# Patient Record
Sex: MALE | Race: WHITE | NOT HISPANIC OR LATINO | Employment: UNEMPLOYED | URBAN - METROPOLITAN AREA
[De-identification: names, ages, dates, MRNs, and addresses within clinical notes are randomized per-mention and may not be internally consistent; named-entity substitution may affect disease eponyms.]

---

## 2020-01-13 ENCOUNTER — TRANSFERRED RECORDS (OUTPATIENT)
Dept: HEALTH INFORMATION MANAGEMENT | Facility: CLINIC | Age: 35
End: 2020-01-13

## 2020-01-14 ENCOUNTER — TRANSFERRED RECORDS (OUTPATIENT)
Dept: HEALTH INFORMATION MANAGEMENT | Facility: CLINIC | Age: 35
End: 2020-01-14

## 2020-01-20 ENCOUNTER — TRANSFERRED RECORDS (OUTPATIENT)
Dept: HEALTH INFORMATION MANAGEMENT | Facility: CLINIC | Age: 35
End: 2020-01-20

## 2020-01-29 ENCOUNTER — TRANSFERRED RECORDS (OUTPATIENT)
Dept: HEALTH INFORMATION MANAGEMENT | Facility: CLINIC | Age: 35
End: 2020-01-29

## 2020-01-30 ENCOUNTER — TRANSFERRED RECORDS (OUTPATIENT)
Dept: HEALTH INFORMATION MANAGEMENT | Facility: CLINIC | Age: 35
End: 2020-01-30

## 2020-02-21 ENCOUNTER — MEDICAL CORRESPONDENCE (OUTPATIENT)
Dept: HEALTH INFORMATION MANAGEMENT | Facility: CLINIC | Age: 35
End: 2020-02-21

## 2020-02-21 ENCOUNTER — TRANSFERRED RECORDS (OUTPATIENT)
Dept: HEALTH INFORMATION MANAGEMENT | Facility: CLINIC | Age: 35
End: 2020-02-21

## 2020-02-26 ENCOUNTER — REFERRAL (OUTPATIENT)
Dept: TRANSPLANT | Facility: CLINIC | Age: 35
End: 2020-02-26

## 2020-02-26 DIAGNOSIS — K70.31 ALCOHOLIC CIRRHOSIS OF LIVER WITH ASCITES (H): Primary | ICD-10-CM

## 2020-02-26 DIAGNOSIS — K76.6 PORTAL HYPERTENSION (H): ICD-10-CM

## 2020-02-26 DIAGNOSIS — K70.30 ALCOHOLIC CIRRHOSIS (H): ICD-10-CM

## 2020-02-26 PROBLEM — M72.2 PLANTAR FASCIITIS: Status: ACTIVE | Noted: 2020-02-26

## 2020-02-26 PROBLEM — E87.1 HYPONATREMIA: Status: ACTIVE | Noted: 2019-11-27

## 2020-02-26 PROBLEM — E78.5 DYSLIPIDEMIA: Status: ACTIVE | Noted: 2020-02-26

## 2020-02-26 PROBLEM — Z71.89 ACP (ADVANCE CARE PLANNING): Status: ACTIVE | Noted: 2020-01-24

## 2020-02-26 PROBLEM — D64.9 NORMOCYTIC ANEMIA: Status: ACTIVE | Noted: 2019-11-27

## 2020-02-26 PROBLEM — E11.9 DIABETES MELLITUS, NEW ONSET (H): Status: ACTIVE | Noted: 2020-01-19

## 2020-02-26 PROBLEM — Z99.2 ESRD NEEDING DIALYSIS (H): Status: ACTIVE | Noted: 2020-01-10

## 2020-02-26 PROBLEM — D72.829 LEUKOCYTOSIS: Status: ACTIVE | Noted: 2020-02-11

## 2020-02-26 PROBLEM — K76.82 HEPATIC ENCEPHALOPATHY (H): Status: ACTIVE | Noted: 2020-01-14

## 2020-02-26 PROBLEM — K92.2 LOWER GI BLEED: Status: ACTIVE | Noted: 2020-01-19

## 2020-02-26 PROBLEM — D68.9 COAGULOPATHY (H): Status: ACTIVE | Noted: 2019-11-27

## 2020-02-26 PROBLEM — K70.10 ACUTE ALCOHOLIC LIVER DISEASE (H): Status: ACTIVE | Noted: 2019-11-27

## 2020-02-26 PROBLEM — D64.9 ACUTE ON CHRONIC ANEMIA: Status: ACTIVE | Noted: 2020-01-10

## 2020-02-26 PROBLEM — N18.6 ESRD NEEDING DIALYSIS (H): Status: ACTIVE | Noted: 2020-01-10

## 2020-02-26 PROBLEM — N17.9 AKI (ACUTE KIDNEY INJURY) (H): Status: ACTIVE | Noted: 2019-11-27

## 2020-02-26 PROBLEM — F17.200 TOBACCO USE DISORDER: Status: ACTIVE | Noted: 2020-02-26

## 2020-02-26 PROBLEM — E55.9 VITAMIN D DEFICIENCY: Status: ACTIVE | Noted: 2020-02-26

## 2020-02-26 PROBLEM — F10.20 ALCOHOL DEPENDENCE (H): Status: ACTIVE | Noted: 2019-11-27

## 2020-02-26 PROBLEM — I95.9 HYPOTENSION: Status: ACTIVE | Noted: 2020-02-11

## 2020-02-26 NOTE — LETTER
LIVER TRANSPLANT  EVALUATION SCHEDULE    Patient:   Christo Herrera  MR#:    [id number]  Coordinator:  Jaimie      [phone]  :     Sherry                 [phone]  [facility]  Date(s):    March 10, 2020 & March 23, 2020       This is your evaluation schedule, please follow dates and times.  You will   receive reminder phone calls for other tests, but please follow this schedule  only!  If you have any questions about dates and times, please call us on  number listed above.  Thank you, Transplant Services     *NO FOOD or DRINK AFTER 11:00 PM*  (You may only have small amounts of water)    Day/Date:    Tuesday, March 10, 2020  Time Location Activity   6:45 AM Imaging and Lab testing  (1st [facility] ) Liver Ultrasound with dopplers=NO FOOD or DRINK 8 HOURS BEFORE TEST!!!   7:30 AM Imaging and Lab testing  (1st floor [facility] ) Chest X-ray    8:00 AM Imaging and Lab testing  (1st floor [facility] ) EKG   8:15 AM Imaging and Lab testing  (1st floor [facility] ) Blood tests    8:30 AM Transplant Services  (3rd floor [facility]) Appointment with Chanel Cardenas  Registered Dietitian   9:00 AM Transplant Services  (3rd floor [facility]) Appointment with Oneal Lopez,     9:30 AM Transplant Services  (3rd floor [facility]) Appointment with Dr. Broderick,  Transplant Surgeon   11:00 AM Medicine Specialties  (3rd floor [facility]) Appointment with Dr. Echavarria,  Hepatologist           Day/Date:    Monday, March 23, 2020  Time Location Activity   8:00 AM [facility] Waiting Room  (2nd floor [facility]  [address] [place] [address]) Dobutamine Stress Echo = SEE ENCLOSED INSTRUCTIONS for TEST!   *9:00 AM [facility]  1st Floor/Entrance of [facility]  (Shuttle  is White with Maroon Lettering)   9:30 AM-11:30 AM Transplant Services  (3rd floor [facility]) Pre-transplant class         Day/Date:    Every Thursday *OPTIONAL*  Time Location Activity   12:00 PM-1:30 PM Liver Transplant Support Group  Hospital Station 7B  Room 7-120 Every Thursday *OPTIONAL*     *IF ON LINE CHECK IN IS NOT DONE, YOU WILL NEED TO CHECK IN 15 MINUTES EARLIER THEN THE FIRST SCHEDULED APPOINTMENT*

## 2020-02-26 NOTE — LETTER
3/3/2020    Christo Herrera  [address] [place] [address]      Dear Christo,    Thank you for your interest in the Transplant Center at [facility], [facility]. We look forward to being a part of your care team and assisting you through the transplant process.    As we discussed, your transplant coordinator is Christo Alcala Jr. (Liver).  You may call your coordinator at any time with questions or concerns call [phone].    Please complete the following.    1. Fill out and return the enclosed forms    Authorization for Electronic Communication    Authorization to Discuss Protected Health Information    Authorization for Release of Protected Health Information    2. Sign up for:    Tetra Techt, access to your electronic medical record (see enclosed pamphlet)    [email or web address], a transplant education website    You can use these tools to learn more about your transplant, communicate with your care team, and track your medical details      Sincerely,  Solid Organ Transplant  [facility], [facility]    cc: Referring Physician and PCP

## 2020-03-02 VITALS — BODY MASS INDEX: 24.33 KG/M2 | WEIGHT: 155 LBS | HEIGHT: 67 IN

## 2020-03-02 ASSESSMENT — MIFFLIN-ST. JEOR: SCORE: 1606.71

## 2020-03-02 NOTE — TELEPHONE ENCOUNTER
Patient was asked the following questions during liver intake call.     Referring Provider:   Referring Diagnosis: EToH Cirrhosis  PCP:     1)Do you know why you have liver disease: Yes             If Alcoholic Cirrhosis is present when was your last drink: 11/2019             Have you ever been through treatment for alcohol: No  2) Presence of Ascites: Yes Paracentesis: Yes  3) Presence of Hepatic Encephalopathy: Yes Medications: Yes  4) History of GI Bleeding: Yes  5) Oxygen Use: None  6) EGD: Yes Where: [facility] When: 2020  7) Colonoscopy: Yes Where: [facility] When:2020  8) MELD Score: 34  9) Insurance information: [employer] Plan       Policy boothe: Self       Subscriber/policy/ID number: [id number]      Group Number:     Referral intake process completed.  Patient is aware that after financial approval is received, medical records will be requested.   Patient confirmed for a callback from transplant coordinator on 03/04/2020.  Tentative evaluation date TBD.    Confirmed coordinator will discuss evaluation process in more detail at the time of their call.   Patient is aware of the need to arrange age appropriate cancer screening, vaccinations, and dental care.  Reminded patient to complete questionnaire, complete medical records release, and review packet prior to evaluation visit .  Assessed patient for special needs (ie--wheelchair, assistance, guardian, and ):  No  Patient instructed to call [phone] with questions.     LAVELLE Kaiser, LPN   Solid Organ Transplant

## 2020-03-04 NOTE — TELEPHONE ENCOUNTER
Alcoholic cirrhosis told to patient in October/November 2019. Did not know before then.    Treatment program at [facility] (January - Feb)    Dr. Candy Marroquin - PCP    MELD 34 (35 with HD points)    Ascites - on HD  Taps - every 2-3 weeks  HE - yes on meds  EGD - grade 1 1/20/20  Colonoscopy - 1/20/20    HD - MWF - but kidney improving, trying to move to PD  HD - [facility] (Marie - )    Head -   Heart -   Lungs - never smoked  Kidney - yes  Cancer -     Support - father main support  - aunt helps with rides  - has 3 brothers    Plan: patient appears to meet the acute criteria, with IP treatment done a month ago at [facility] (appears to have at least started treatment, but may have been too sick to finish???)  Will scheduled for full day on Tuesday to start evaluation. Has HD MWF and will set up class, etc for the following Monday.

## 2020-03-10 ENCOUNTER — COMMITTEE REVIEW (OUTPATIENT)
Dept: TRANSPLANT | Facility: CLINIC | Age: 35
End: 2020-03-10

## 2020-03-10 ENCOUNTER — ALLIED HEALTH/NURSE VISIT (OUTPATIENT)
Dept: TRANSPLANT | Facility: CLINIC | Age: 35
End: 2020-03-10
Attending: INTERNAL MEDICINE
Payer: COMMERCIAL

## 2020-03-10 ENCOUNTER — OFFICE VISIT (OUTPATIENT)
Dept: GASTROENTEROLOGY | Facility: CLINIC | Age: 35
End: 2020-03-10
Attending: INTERNAL MEDICINE
Payer: COMMERCIAL

## 2020-03-10 ENCOUNTER — OFFICE VISIT (OUTPATIENT)
Dept: TRANSPLANT | Facility: CLINIC | Age: 35
End: 2020-03-10
Attending: TRANSPLANT SURGERY
Payer: COMMERCIAL

## 2020-03-10 ENCOUNTER — ANCILLARY PROCEDURE (OUTPATIENT)
Dept: ULTRASOUND IMAGING | Facility: CLINIC | Age: 35
End: 2020-03-10
Attending: INTERNAL MEDICINE
Payer: COMMERCIAL

## 2020-03-10 ENCOUNTER — ANCILLARY PROCEDURE (OUTPATIENT)
Dept: GENERAL RADIOLOGY | Facility: CLINIC | Age: 35
End: 2020-03-10
Attending: INTERNAL MEDICINE
Payer: COMMERCIAL

## 2020-03-10 VITALS
WEIGHT: 151.7 LBS | BODY MASS INDEX: 22.99 KG/M2 | HEIGHT: 68 IN | HEART RATE: 82 BPM | DIASTOLIC BLOOD PRESSURE: 61 MMHG | TEMPERATURE: 97.9 F | OXYGEN SATURATION: 100 % | SYSTOLIC BLOOD PRESSURE: 95 MMHG

## 2020-03-10 DIAGNOSIS — K70.31 ALCOHOLIC CIRRHOSIS OF LIVER WITH ASCITES (H): Primary | ICD-10-CM

## 2020-03-10 DIAGNOSIS — E55.9 VITAMIN D DEFICIENCY: Primary | ICD-10-CM

## 2020-03-10 DIAGNOSIS — Z99.2 ESRD (END STAGE RENAL DISEASE) ON DIALYSIS (H): ICD-10-CM

## 2020-03-10 DIAGNOSIS — K70.31 ALCOHOLIC CIRRHOSIS OF LIVER WITH ASCITES (H): ICD-10-CM

## 2020-03-10 DIAGNOSIS — N18.6 ESRD (END STAGE RENAL DISEASE) ON DIALYSIS (H): ICD-10-CM

## 2020-03-10 DIAGNOSIS — K76.6 PORTAL HYPERTENSION (H): ICD-10-CM

## 2020-03-10 DIAGNOSIS — K70.30 ALCOHOLIC CIRRHOSIS (H): Primary | ICD-10-CM

## 2020-03-10 DIAGNOSIS — K76.82 HEPATIC ENCEPHALOPATHY (H): ICD-10-CM

## 2020-03-10 DIAGNOSIS — Z76.82 AWAITING ORGAN TRANSPLANT STATUS: Primary | ICD-10-CM

## 2020-03-10 LAB
ABO + RH BLD: NORMAL
AFP SERPL-MCNC: <1.5 UG/L (ref 0–8)
ALBUMIN SERPL-MCNC: 2.2 G/DL (ref 3.4–5)
ALBUMIN UR-MCNC: 30 MG/DL
ALP SERPL-CCNC: 237 U/L (ref 40–150)
ALT SERPL W P-5'-P-CCNC: 22 U/L (ref 0–70)
ANION GAP SERPL CALCULATED.3IONS-SCNC: 8 MMOL/L (ref 3–14)
APPEARANCE UR: CLEAR
AST SERPL W P-5'-P-CCNC: 48 U/L (ref 0–45)
BACTERIA #/AREA URNS HPF: ABNORMAL /HPF
BILIRUB DIRECT SERPL-MCNC: 1.2 MG/DL (ref 0–0.2)
BILIRUB SERPL-MCNC: 2 MG/DL (ref 0.2–1.3)
BILIRUB UR QL STRIP: ABNORMAL
BLD GP AB SCN SERPL QL: NORMAL
BLOOD BANK CMNT PATIENT-IMP: NORMAL
BUN SERPL-MCNC: 19 MG/DL (ref 7–30)
CALCIUM SERPL-MCNC: 8.4 MG/DL (ref 8.5–10.1)
CHLORIDE SERPL-SCNC: 102 MMOL/L (ref 94–109)
CHOLEST SERPL-MCNC: 224 MG/DL
CMV IGG SERPL QL IA: >8 AI (ref 0–0.8)
CO2 SERPL-SCNC: 27 MMOL/L (ref 20–32)
COLOR UR AUTO: ABNORMAL
CREAT SERPL-MCNC: 4.09 MG/DL (ref 0.66–1.25)
CREAT UR-MCNC: 512 MG/DL
DEPRECATED CALCIDIOL+CALCIFEROL SERPL-MC: 10 UG/L (ref 20–75)
EBV VCA IGG SER QL IA: >8 AI (ref 0–0.8)
ERYTHROCYTE [DISTWIDTH] IN BLOOD BY AUTOMATED COUNT: 16.8 % (ref 10–15)
FERRITIN SERPL-MCNC: 563 NG/ML (ref 26–388)
GFR SERPL CREATININE-BSD FRML MDRD: 18 ML/MIN/{1.73_M2}
GLUCOSE SERPL-MCNC: 116 MG/DL (ref 70–99)
GLUCOSE UR STRIP-MCNC: NEGATIVE MG/DL
HCT VFR BLD AUTO: 24.8 % (ref 40–53)
HDLC SERPL-MCNC: 42 MG/DL
HGB BLD-MCNC: 7.2 G/DL (ref 13.3–17.7)
HGB UR QL STRIP: NEGATIVE
HYALINE CASTS #/AREA URNS LPF: 248 /LPF (ref 0–2)
INR PPP: 1.48 (ref 0.86–1.14)
INTERPRETATION ECG - MUSE: NORMAL
IRON SATN MFR SERPL: 23 % (ref 15–46)
IRON SERPL-MCNC: 24 UG/DL (ref 35–180)
KETONES UR STRIP-MCNC: NEGATIVE MG/DL
LDLC SERPL CALC-MCNC: 153 MG/DL
LEUKOCYTE ESTERASE UR QL STRIP: ABNORMAL
MCH RBC QN AUTO: 29.9 PG (ref 26.5–33)
MCHC RBC AUTO-ENTMCNC: 29 G/DL (ref 31.5–36.5)
MCV RBC AUTO: 103 FL (ref 78–100)
MUCOUS THREADS #/AREA URNS LPF: PRESENT /LPF
NITRATE UR QL: NEGATIVE
NONHDLC SERPL-MCNC: 182 MG/DL
PH UR STRIP: 5 PH (ref 5–7)
PHOSPHATE SERPL-MCNC: 3.3 MG/DL (ref 2.5–4.5)
PLATELET # BLD AUTO: 161 10E9/L (ref 150–450)
POTASSIUM SERPL-SCNC: 3.4 MMOL/L (ref 3.4–5.3)
PROT SERPL-MCNC: 6 G/DL (ref 6.8–8.8)
PROT UR-MCNC: 1.6 G/L
PROT/CREAT 24H UR: 0.31 G/G CR (ref 0–0.2)
RBC # BLD AUTO: 2.41 10E12/L (ref 4.4–5.9)
RBC #/AREA URNS AUTO: 2 /HPF (ref 0–2)
SODIUM SERPL-SCNC: 136 MMOL/L (ref 133–144)
SOURCE: ABNORMAL
SP GR UR STRIP: 1.02 (ref 1–1.03)
SPECIMEN EXP DATE BLD: NORMAL
SPECIMEN EXP DATE BLD: NORMAL
SQUAMOUS #/AREA URNS AUTO: 1 /HPF (ref 0–1)
T PALLIDUM AB SER QL: NONREACTIVE
TIBC SERPL-MCNC: 107 UG/DL (ref 240–430)
TRANSFERRIN SERPL-MCNC: 94 MG/DL (ref 210–360)
TRIGL SERPL-MCNC: 146 MG/DL
TSH SERPL DL<=0.005 MIU/L-ACNC: 3.52 MU/L (ref 0.4–4)
UROBILINOGEN UR STRIP-MCNC: 0 MG/DL (ref 0–2)
WBC # BLD AUTO: 8.4 10E9/L (ref 4–11)
WBC #/AREA URNS AUTO: 11 /HPF (ref 0–5)

## 2020-03-10 PROCEDURE — 80076 HEPATIC FUNCTION PANEL: CPT | Performed by: INTERNAL MEDICINE

## 2020-03-10 PROCEDURE — 82728 ASSAY OF FERRITIN: CPT | Performed by: INTERNAL MEDICINE

## 2020-03-10 PROCEDURE — 86850 RBC ANTIBODY SCREEN: CPT | Performed by: INTERNAL MEDICINE

## 2020-03-10 PROCEDURE — 82105 ALPHA-FETOPROTEIN SERUM: CPT | Performed by: INTERNAL MEDICINE

## 2020-03-10 PROCEDURE — 36415 COLL VENOUS BLD VENIPUNCTURE: CPT | Performed by: INTERNAL MEDICINE

## 2020-03-10 PROCEDURE — 83540 ASSAY OF IRON: CPT | Performed by: INTERNAL MEDICINE

## 2020-03-10 PROCEDURE — 84156 ASSAY OF PROTEIN URINE: CPT | Performed by: INTERNAL MEDICINE

## 2020-03-10 PROCEDURE — 80061 LIPID PANEL: CPT | Performed by: INTERNAL MEDICINE

## 2020-03-10 PROCEDURE — 86886 COOMBS TEST INDIRECT TITER: CPT | Performed by: INTERNAL MEDICINE

## 2020-03-10 PROCEDURE — 86665 EPSTEIN-BARR CAPSID VCA: CPT | Performed by: INTERNAL MEDICINE

## 2020-03-10 PROCEDURE — 82306 VITAMIN D 25 HYDROXY: CPT | Performed by: INTERNAL MEDICINE

## 2020-03-10 PROCEDURE — 84100 ASSAY OF PHOSPHORUS: CPT | Performed by: INTERNAL MEDICINE

## 2020-03-10 PROCEDURE — 80321 ALCOHOLS BIOMARKERS 1OR 2: CPT | Performed by: INTERNAL MEDICINE

## 2020-03-10 PROCEDURE — 86901 BLOOD TYPING SEROLOGIC RH(D): CPT | Performed by: INTERNAL MEDICINE

## 2020-03-10 PROCEDURE — 80048 BASIC METABOLIC PNL TOTAL CA: CPT | Performed by: INTERNAL MEDICINE

## 2020-03-10 PROCEDURE — 84443 ASSAY THYROID STIM HORMONE: CPT | Performed by: INTERNAL MEDICINE

## 2020-03-10 PROCEDURE — 85610 PROTHROMBIN TIME: CPT | Performed by: INTERNAL MEDICINE

## 2020-03-10 PROCEDURE — 83550 IRON BINDING TEST: CPT | Performed by: INTERNAL MEDICINE

## 2020-03-10 PROCEDURE — 86900 BLOOD TYPING SEROLOGIC ABO: CPT | Mod: 91 | Performed by: INTERNAL MEDICINE

## 2020-03-10 PROCEDURE — 86644 CMV ANTIBODY: CPT | Performed by: INTERNAL MEDICINE

## 2020-03-10 PROCEDURE — 84466 ASSAY OF TRANSFERRIN: CPT | Performed by: INTERNAL MEDICINE

## 2020-03-10 PROCEDURE — 86780 TREPONEMA PALLIDUM: CPT | Performed by: INTERNAL MEDICINE

## 2020-03-10 PROCEDURE — 86481 TB AG RESPONSE T-CELL SUSP: CPT | Performed by: INTERNAL MEDICINE

## 2020-03-10 PROCEDURE — 81001 URINALYSIS AUTO W/SCOPE: CPT | Performed by: INTERNAL MEDICINE

## 2020-03-10 PROCEDURE — 87086 URINE CULTURE/COLONY COUNT: CPT | Performed by: INTERNAL MEDICINE

## 2020-03-10 PROCEDURE — 85027 COMPLETE CBC AUTOMATED: CPT | Performed by: INTERNAL MEDICINE

## 2020-03-10 RX ORDER — ALBUMIN (HUMAN) 12.5 G/50ML
12.5 SOLUTION INTRAVENOUS
Status: CANCELLED | OUTPATIENT
Start: 2020-03-10

## 2020-03-10 RX ORDER — MENTHOL AND METHYL SALICYLATE 10; 30 G/100G; G/100G
CREAM TOPICAL DAILY PRN
COMMUNITY

## 2020-03-10 RX ORDER — CALCIUM CARBONATE 160(400)MG
TABLET,CHEWABLE ORAL
COMMUNITY
Start: 2020-01-27 | End: 2021-08-11

## 2020-03-10 RX ORDER — LIDOCAINE HYDROCHLORIDE 10 MG/ML
20 INJECTION, SOLUTION EPIDURAL; INFILTRATION; INTRACAUDAL; PERINEURAL ONCE
Status: CANCELLED | OUTPATIENT
Start: 2020-03-10

## 2020-03-10 RX ORDER — LACTULOSE 10 G/15ML
10-15 SOLUTION ORAL 2 TIMES DAILY
COMMUNITY
Start: 2020-02-15 | End: 2023-10-31

## 2020-03-10 RX ORDER — METOPROLOL TARTRATE 25 MG/1
6.25 TABLET, FILM COATED ORAL 2 TIMES DAILY
COMMUNITY
Start: 2020-03-09 | End: 2020-07-20

## 2020-03-10 RX ORDER — MULTIVITAMIN,THER AND MINERALS
1 TABLET ORAL EVERY MORNING
COMMUNITY
Start: 2019-12-29

## 2020-03-10 RX ORDER — MIDODRINE HYDROCHLORIDE 5 MG/1
2.5 TABLET ORAL 3 TIMES DAILY PRN
COMMUNITY
Start: 2020-02-15 | End: 2023-10-31

## 2020-03-10 RX ORDER — GUAIFENESIN 600 MG/1
600 TABLET, EXTENDED RELEASE ORAL 2 TIMES DAILY PRN
COMMUNITY
Start: 2020-02-15 | End: 2023-10-31

## 2020-03-10 RX ORDER — LANOLIN ALCOHOL/MO/W.PET/CERES
100 CREAM (GRAM) TOPICAL
COMMUNITY
Start: 2020-03-09 | End: 2021-08-11

## 2020-03-10 ASSESSMENT — MIFFLIN-ST. JEOR: SCORE: 1603.12

## 2020-03-10 NOTE — COMMITTEE REVIEW
Abdominal Committee Review Note     Evaluation Date: 3/10/2020  Committee Review Date: 3/10/2020    Organ being evaluated for: Liver    Transplant Phase: Evaluation  Transplant Status: Declined    Transplant Coordinator: Christo Alcala Jr.  Transplant Surgeon:   Josr Broderick    Referring Physician: Luda Caraballo    Primary Diagnosis: alcoholic cirrhosis  Secondary Diagnosis: dialysis dependent    Committee Review Members:  Nutrition Chanel Cardenas, RD   Pharmacy Abdullahi Das, [facility]    - Clinical Oneal Lopez, KAYA, Martha Dueñas, [facility]   Transplant Félix Thomas MD, Jr Christo Alcala, HARSHAD, Savita Oconnell, APRN CNP, Brandin Echavarria MD, Misha Bowen MD, Marie Evans RN, Josr Broderick MD, Thomas M. Leventhal, MD, Gerson Solis MD       Transplant Eligibility: ETOH, Cirrhosis with MELD    Committee Review Decision: Declined    Relative Contraindications: Other, CD evaluation and recommendations / 6 mo sobriety    Absolute Contraindications: None    Committee Chair Brandin Echavarria MD verbally attested to the committee's decision.    Committee Discussion Details:     - Complete DSE and txp class on Monday  - Close evaluation  - Continue to follow and re-evaluation at 6 mo sober & doing CD recs  - Possible liver could improve, but good chance he will need a kidney

## 2020-03-10 NOTE — LETTER
3/10/2020       RE: Christo Herrera  [address] [place] [address]     Dear Colleague,    Thank you for referring your patient, Christo Herrera, to the [facility] HEPATOLOGY at [facility]. Please see a copy of my visit note below.    [facility]    Hepatology New Patient Visit    Referring provider:  Luda Caraballo    CHIEF COMPLAINT/REASON FOR THE VISIT:  Alcoholic liver disease.      HISTORY OF PRESENT ILLNESS:  Mr. Christo Herrera is a 34-year-old male with a history significant for alcohol abuse from early age who is sent to us for alcohol-related cirrhosis and evaluation for transplant.  Mr. Herrera started alcoholic beverages from an early age of 18, and according to him, he was drinking more in the last 3-4 years as he had lost his job.  Sometime in November of last year  he developed what appears to be alcoholic hepatitis, and he presented with jaundice and other signs of portal hypertension including fluid retention.  He also had paracentesis at that time, although he did not have any spontaneous bacterial peritonitis.  He continued to have multiple admissions in December for septic shock, anemia, hepatic encephalopathy and GI bleed and, in fact, still follows with [facility] for that.  He did have an EGD, which showed grade 1 esophageal varices and portal hypertensive gastropathy.  He had a colonoscopy which showed only a hemorrhoidal bleed.      Mr. Herrera today appears to be lucid and answers all questions appropriately, although he is in a wheelchair.  He can walk.  He has also no significant abdominal pain as of now.  Denies any nausea or vomiting.  He is having 4-5 bowel movements and takes lactulose.  He did lose a lot of muscle mass according to him and his dad also.  His last alcohol intake appears to be in 12/2019 but we have documentation that he might have had a positive test in January  most likely.       Mr. Herrera also for the last 9 months stayed on dialysis and still continues to do it.  It was initially thought that his kidney disease was related with ATN with possible hepatorenal syndrome.     Medical hx Surgical hx   Past Medical History:   Diagnosis Date     Alcoholic cirrhosis of liver with ascites (H) 3/10/2020     Ascites      ESRD (end stage renal disease) (H)      GERD (gastroesophageal reflux disease)      HE (hepatic encephalopathy) (H)      History of blood transfusion       Past Surgical History:   Procedure Laterality Date     COLONOSCOPY      [facility] 2020     GI SURGERY            Medications  Prior to Admission medications    Medication Sig Start Date End Date Taking? Authorizing Provider   guaiFENesin (MUCINEX) 600 MG 12 hr tablet Take 600 mg by mouth 2/15/20   Reported, Patient   lactulose (CHRONULAC) 10 GM/15ML solution Take 10 g by mouth 2/15/20   Reported, Patient   Menthol-Methyl Salicylate (ICY HOT EXTRA STRENGTH) 10-30 % CREA     Reported, Patient   metoprolol tartrate (LOPRESSOR) 25 MG tablet Take 6.25 mg by mouth 3/9/20   Reported, Patient   midodrine (PROAMATINE) 5 MG tablet Take 15 mg by mouth 2/15/20   Reported, Patient   Misc. Devices (ROLLATOR ULTRA-LIGHT) MISC Walker with front wheels for home use. 1/27/20   Reported, Patient   Multiple Vitamins-Minerals (SUPER THERA PRERNA M) TABS Take 1 tablet by mouth 12/29/19   Reported, Patient   Psyllium 58.12 % PACK Bid daily    Reported, Patient   vitamin B1 (VITAMIN B-1) 100 MG tablet Take 100 mg by mouth 3/9/20   Reported, Patient       Allergies  No Known Allergies    Family hx Social hx   Family History   Problem Relation Age of Onset     Coronary Artery Disease Father      Hypertension Maternal Grandfather       Social History     Tobacco Use     Smoking status: Never Smoker     Smokeless tobacco: Never Used   Substance Use Topics     Alcohol use: Not Currently     Comment: Quit 11/2019     Drug use: Not  "Currently          REVIEW OF SYMPTOMS:  He denies having any recent infections, although he was admitted to the hospital sometime for septicemia.  He has significant fatigue.  He is in a wheelchair.  Denies any headaches or seizures like withdrawal seizures.  Does not have any cough or shortness of breath now nor does he have any chest pain.  He has anemia and easy bruising.  Is not known to have diabetes or thyroid disease.  He is not known to have any degenerative joint disease.  Psychiatric wise, he has a prolonged history of alcohol abuse.  As far as the eyes are concerned, no issues.  Likewise, he does not have any hearing issues.  Skin wise, he has no problems except the easy bruising.         Examination  BP 95/61   Pulse 82   Temp 97.9  F (36.6  C) (Oral)   Ht 1.72 m (5' 7.72\")   Wt 68.8 kg (151 lb 11.2 oz)   SpO2 100%   BMI 23.26 kg/m       Gen- well, NAD, A+Ox3, normal color  Eye- EOMI  ENT- MMM, normal oropharynx  Lym- no palpable lymphadenopathy  CVS- S1, S2 normal, no added sounds, RRR  RS- CTA  Abd- Mildly distended with shifting dullness.  Extr- pulses good, no LARISSA  MS- hands normal- no clubbing  Neuro- A+Ox3, no asterixis  Skin- no rash or jaundice  Psych- normal mood    Laboratory  Lab Results   Component Value Date     03/10/2020    POTASSIUM 3.4 03/10/2020    CHLORIDE 102 03/10/2020    CO2 27 03/10/2020    BUN 19 03/10/2020    CR 4.09 03/10/2020       Lab Results   Component Value Date    BILITOTAL 2.0 03/10/2020    ALT 22 03/10/2020    AST 48 03/10/2020    ALKPHOS 237 03/10/2020       Lab Results   Component Value Date    ALBUMIN 2.2 03/10/2020    PROTTOTAL 6.0 03/10/2020        Lab Results   Component Value Date    WBC 8.4 03/10/2020    HGB 7.2 03/10/2020     03/10/2020     03/10/2020       Lab Results   Component Value Date    INR 1.48 03/10/2020     MELD-Na score: 27 at 3/10/2020  7:21 AM  MELD score: 27 at 3/10/2020  7:21 AM  Calculated from:  Serum Creatinine: 4.09 " mg/dL (Rounded to 4 mg/dL) at 3/10/2020  7:21 AM  Serum Sodium: 136 mmol/L at 3/10/2020  7:21 AM  Total Bilirubin: 2.0 mg/dL at 3/10/2020  7:21 AM  INR(ratio): 1.48 at 3/10/2020  7:21 AM  Age: 34 years 9 months      Radiology    ASSESSMENT AND PLAN:  Alcoholic liver disease.        Mr. Herrera has alcoholic liver disease, most likely acute on chronic liver disease.  He, in fact, has improved.  As his bilirubin went down, the INR also improved.  He has done a short period of around 18 days of alcohol treatment.  He has been, also, on dialysis now for 9 weeks.  In case he is evaluated or goes forward for evaluation, he will need a liver and kidney, as in my opinion, there is a possibility that his kidney does not come back.  He has only been alcohol free for possibly 2 months.  He will need 6 months of abstinence.  He might need, also, further treatment, and we will need, also, information regarding his previous treatment of chemical dependency.  His chance of relapse is very high, so we await input from our  and her recommendations, and we will follow him here in 3 months.  We did explain that to him and his dad.  In the meantime, he will need rehabilitation, physical, also, as the patient although 34 is still wheelchair bound, and he will do that.  As far as complications from his liver disease are concerned, he will continue doing paracentesis as needed with his hepatologist, and he will need, also, to continue doing surveillance for HCC or worsening of his portal hypertension through upper endoscopies.  Please note that the patient has grade 1 esophageal varices.  I will see him here in 3 months, at which time he will be close to finishing his 6 month sobriety.      This was a 1 hour visit, of which more than 50% was spent in explaining to the patient what our plan of care was.  We answered all his questions.      cc:   Luda Caraballo MD   [facility]    [address] [place] [address]    [place] [address]     Brandin Echavarria MD  Hepatology  [facility]

## 2020-03-10 NOTE — PROGRESS NOTES
Psychosocial Assessment for Liver Transplant Evaluation  Christo Herrera was seen in the Transplant Center as part of his evaluation as a potential liver transplant recipient.  His father Christo was also present.  Living Situation: Christo lives with his father Christo and brother Miguel in a house in [place], [place].  His father owns the house.  Christo has lived with his father and brother for the past three to four months, and previously lived with his mother.  He changed his living situation because his mother smokes cigarettes.  Christo' father assists him with his medication management and transportation to medical appointments.  Education/Employment:  Nelson graduated high school.  He is not a [employer].  Nelson last worked four years ago, initially due to substance abuse and at present he is unable to work due to his health.    Financial /Income: Christo does not have a source of income.  He is financially supported by his father.  I provided education about applying for SSDI and Supplemental Security Income.  Christo reports his cousin Malinda is assisting him in applying.  Health Insurance:  [employer] Medical Assistance.  This writer talked with Christo and his father about the financial risks of transplant, particularly about the high cost of transplant related medications and the importance of maintaining adequate health insurance coverage.  Family/Social Support:   This writer stressed the importance of having a stable and involved support network before and after transplant.  Provided Christo and his father with education about the relationship between a stable support system and better surgical and post-transplant outcomes compared to patients with a limited support system.    Functional Status: Christo has functional limitations and this should be monitored.  He is using a clinic wheelchair today, and he reports using a walker as needed at home.  Chemical Dependency:  Christo denies any history of using tobacco  products.  He reports marijuana use but none since his mid twenties.  Christo denies pain medication abuse.   In regards to alcohol, Christo reports a history of consuming a 1/2 liter of liquor, six days of the week up until the end of October 2019.  He reports he would start drinking in the morning and throughout the day.   Christo started drinking at the age of [age].  He has a history of one DUI in his early twenties.  Christo also reports he completed inpatient treatment at [facility] in February of 2020 for eighteen days.  He is not currently engaged in relapse prevention.  I did provide him with education and a consent for for [facility].  Of note, the medical records documents patient having positive alcohol tests on 12/20/19 and 1/13/20, though Christo does not recall this information.  Mental Health: Christo denies any mental health history.    PHQ-9 score today: 1  BERTA-7 score today: 0  Adjustment to Illness:  Christo has cirrhosis caused by alcohol.   He attributes his heavy alcohol consumption to his peer group, not being employed and using alcohol to help sleep.   Christo feels inpatient treatment was helpful and he is hopeful he can maintain sobriety lifelong.  This writer provided Christo and his father with supportive counseling throughout this interview.  This writer also encouraged Christo and his father to attend the liver transplant support group for additional support and encouragement.   Impression/Recommendations:   Christo and his father verbalize understanding the psychosocial risks of transplant and teaching provided during this evaluation.  Christo has not met sobriety criteria recommended for listing,  He does not remember consuming alcohol beyond October of last year, and medical records document consumption as recent as January 13, 2020.  PEth and EtG tests are pending.  Christo did complete an eighteen day inpatient substance abuse treatment program in February of this  year. I have requested a discharge summary from this treatment, which I will review and evaluate for any further recommendations.  Christo is not currently engaged in relapse prevention.  I have recommended he begin using [facility]'s telephone coaching.  A referral to Dr. Renae, Psychologist should be considered.  Christo has strong support from his father Christo who is present today.  The Caregiver Agreement for Liver Transplantation was discussed.  Christo has no source of income, and should apply for SSDI and Supplemental Security Income.  Christo has adequate health insurance for transplantation.  Christo is a poor transplant candidate from a psychosocial perspective at this time.  This writer will remain available to assist patient throughout the evaluation process.  It was a pleasure to evaluate this patient for liver transplant.   Teaching completed during assessment:  1.     Housing and relocation needs post transplant.  2.     Caregiver needs post transplant.  3.     Financial issues related to transplant.  4.     Risks of alcohol use post transplant.  5.     Common psychosocial stressors pre/post transplant.        6.     Liver Transplant support group availability.        7.     [employer] Directive-form and education provided             Psychosocial Risks of Transplant Reviewed:  1.     Increased stress related to your emotional, family, social, employment, or   financial situation.  2.     Affect on work and/or disability benefits.  3.     Affect on future health and life insurance.  4.     Transplant outcome expectations may not be met.  5.     Mental Health risks: anxiety, depression, PTSD, guilt, grief and chronic fatigue.     [place]   Back pain and leg pain

## 2020-03-10 NOTE — LETTER
March 12, 2020    Christo Herrera  [address] [place] [address]    Dear Mr. Herrera,   The purpose of this letter is to let you know that on  the [facility] Multi-Disciplinary Selection Team reviewed the results of your transplant evaluation.  Based on the results of your evaluation and the selection criteria used by our program , the decision was made to not list you on the kidney and liver transplant list.  This is because you have not quite met our chemical dependency requirements for listing  Important things you should know:    We recommend that you continue to follow up with your primary care doctor and/or GI provider in order to manage your health concerns.    You should plan on attending transplant class and having the Dobutamine Stress echo done in prep for when we are able to get you back into evaluation. This will be re-scheduled for mid to late April due to Covid-19 concerns / policies     You should plan on following with Dr. Echavarria on 5/5/20 to continue your liver care. We will also assess if/when we can get you back into transplant evaluation.   Enclosed is a letter from [facility] which describes the services offered to patients by [facility] and the Organ Procurement and Transplantation Network.  Thank you for allowing us to participate in your care.  We wish you well.  Sincerely,    Christo Alcala Jr., BSN, RN  Liver Transplant Coordinator  [phone]    Solid Organ Transplant  [facility]    Enclosures: OS Letter  cc: Care Team            The Organ Procurement and Transplantation Network  Toll-free patient services line:     Your resource for organ transplant information    If you have a question regarding your own medical care, you always should call your transplant hospital first. However, for general organ transplant-related information, you can call the Organ Procurement and  Transplantation Network (OPTN) toll-free patient services line at [phone]- [phone]. Anyone, including potential transplant candidates, candidates, recipients, family members, friends, living donors, and donor family members, can call this number to:          Talk about organ donation, living donation, the transplant process, the donation process, and transplant policies.    Get a free patient information kit with helpful booklets, waiting list and transplant information, and a list of all transplant hospitals.    Ask questions about the OPTN website (https://optn.transplant.hrsa.gov/), the United Network for Organ Sharing s (UNOS) website (https://unos.org/), or the UNOS website for living donors and transplant recipients. (https://www.transplantliving.org/).    Learn how the OPTN can help you.    Talk about any concerns that you may have with a transplant hospital.    The [facility] transplant system, the OPTN, is managed under federal contract by the United Network for Organ Sharing (UNOS), which is a non-profit charitable organization. The OPTN helps create and define organ sharing policies that make the best use of donated organs. This process continuously evaluating new advances and discoveries so policies can be adapted to best serve patients waiting for transplants. To do so, the OPTN works closely with transplant professionals, transplant patients, transplant candidates, donor families, living donors, and the public. All transplant programs and organ procurement organizations throughout the country are OPTN members and are obligated to follow the policies the OPTN creates for allocating organs.    The OPTN also is responsible for:      Providing educational material for patients, the public, and professionals.    Raising awareness of the need for donated organs and tissue.    Coordinating organ procurement, matching, and placement.    Collecting information about every organ transplant and donation that occurs  in the [place].    Remember, you should contact your transplant hospital directly if you have questions or concerns about your own medical care including medical records, work-up progress, and test results.    We are not your transplant hospital, and our staff will not be able to answer questions about your case, so please keep your transplant hospital s phone number handy.    However, while you research your transplant needs and learn as much as you can about transplantation and donation, we welcome your call to our toll-free patient services line at [phone].          Updated 4/1/2019

## 2020-03-10 NOTE — PROGRESS NOTES
Outpatient MNT: Liver Transplant Evaluation    Current BMI: 23.3 (HT 67.75 in,  lbs/69 kg)  BMI is within criteria of <40 for liver transplant     Time Spent: 15 minutes  Visit Type: Initial  Referring Physician: Davie   Pt accompanied by: his dad     Medical dx associated with RD referral  - EtOH cirrhosis  - ESRD    History of previous txp: none     Nutrition Assessment  Pt has been on HD MWF 7-1030 am for 3 months. He does receive a protein drink with dialysis. He cooks for himself.     Appetite: good/baseline     Vitamins, Supplements, Pertinent Meds: MVI, B1  Herbal Medicines/Supplements: none     Diet Recall  Breakfast Cereal    Lunch Turkey s/w    Dinner Chicken/pork/roast (6 oz) + potato or burgers    Snacks Ritz or tory crackers, veggies, berries    Beverages Lemonade, water, cranberry or pineapple juice, some sprite; has 1-2 protein drinks/day    Dining out 2x/month      Physical Activity  Some upper body activities and walking      Anthropometrics  Height:   67.75 in   BMI:    23.3    Weight Status:Normal BMI   Weight:  151 lbs            IBW (lb): 152  % IBW: 99    Wt Hx: Pt reports + edema. Prior UBW was 180-190 lbs, with noted muscle loss (moderate to severe).     Adj/dosing BW: 151 lbs/69 kg       Malnutrition  % Intake: No decreased intake noted  % Weight Loss: yes, but difficult to determine dry vs fluid loss  Subcutaneous Fat Loss: Mild/Moderate  Muscle Loss: Moderate/Severe  Fluid Accumulation/Edema: Moderate   Malnutrition Diagnosis: Non-Severe vs severe malnutrition in the context of chronic illness     Estimated Nutrition Needs  Energy  [id number]     (25-30 kcal/kg for maintenance)     Protein      (1.2-1.5 g/kg for increased needs, dialysis)         Fluid  1 ml/kcal or per MD        Micronutrient   Na+: <2000 mg/day            Nutrition Diagnosis  Food and nutrition related knowledge deficit r/t pre liver transplant eval AEB pt verbalized not hearing pre/post transplant  diet guidelines.    Nutrition Intervention  Nutrition education provided:  Discussed sodium intake (low sodium foods and drinks, seasoning food without salt and tips for low sodium diet).    Reviewed adequate protein intake. Encouraged receiving protein from both animal and plant based sources.     Reviewed wnl K level of 3.5 from 2/21 and slightly elevated Phos level of 5.2. Discussed foods to limit pending lab values that were just drawn yesterday per pt.     Reviewed post txp diet guidelines in brief (will review in further detail post txp):  (1) Review of proper food safety measures d/t immunosuppressant therapy post-op and increased risk for food-borne illness    (2) Avoid the following post txp d/t risk for rejection, unknown effects on the organs, and/or potential interactions with immunosuppressants:  - Herbal, Chinese, holistic, chiropractic, natural, alternative medicines and supplements  - Detoxes and cleanses  - Weight loss pills  - Protein powders or other products with extracts or herbs (ie green tea extract)    (3) Med regimen and possible side effects    Patient Understanding: Pt verbalized understanding of education provided.  Expected Compliance: Good  Follow-Up Plans: PRN     Nutrition Goals  1. Limit Na+ <2000mg/day  2. Pt to verbalize understanding of 3 aspects of post txp education provided  3. Minimum of 83 g protein/day     Provided pt with contact info.   Chanel Cardenas RD, LD  Pgr [phone]

## 2020-03-10 NOTE — PROGRESS NOTES
[facility]    Hepatology New Patient Visit    Referring provider:  Luda Caraballo    CHIEF COMPLAINT/REASON FOR THE VISIT:  Alcoholic liver disease.      HISTORY OF PRESENT ILLNESS:  Mr. Christo Herrera is a 34-year-old male with a history significant for alcohol abuse from early age who is sent to us for alcohol-related cirrhosis and evaluation for transplant.  Mr. Herrera started alcoholic beverages from an early age of 18, and according to him, he was drinking more in the last 3-4 years as he had lost his job.  Sometime in November of last year  he developed what appears to be alcoholic hepatitis, and he presented with jaundice and other signs of portal hypertension including fluid retention.  He also had paracentesis at that time, although he did not have any spontaneous bacterial peritonitis.  He continued to have multiple admissions in December for septic shock, anemia, hepatic encephalopathy and GI bleed and, in fact, still follows with [facility] for that.  He did have an EGD, which showed grade 1 esophageal varices and portal hypertensive gastropathy.  He had a colonoscopy which showed only a hemorrhoidal bleed.      Mr. Herrera today appears to be lucid and answers all questions appropriately.  Although he is in a wheelchair, he can walk.  He has also no significant abdominal pain as of now.  Denies any nausea or vomiting.  He is having 4-5 bowel movements and takes lactulose.  He did lose a lot of muscle mass according to him and his dad also.  His last alcohol intake appears to be in 12/2019 but we have documentation that he might have had a positive test in January most likely.       Mr. Herrera also for the last 9 months stayed on dialysis and still continues to do it.  It was initially thought that his kidney disease was related with ATN with possible hepatorenal syndrome.     Medical hx Surgical hx   Past Medical History:   Diagnosis Date      Alcoholic cirrhosis of liver with ascites (H) 3/10/2020     Ascites      ESRD (end stage renal disease) (H)      GERD (gastroesophageal reflux disease)      HE (hepatic encephalopathy) (H)      History of blood transfusion       Past Surgical History:   Procedure Laterality Date     COLONOSCOPY      [facility] 2020     GI SURGERY            Medications  Prior to Admission medications    Medication Sig Start Date End Date Taking? Authorizing Provider   guaiFENesin (MUCINEX) 600 MG 12 hr tablet Take 600 mg by mouth 2/15/20   Reported, Patient   lactulose (CHRONULAC) 10 GM/15ML solution Take 10 g by mouth 2/15/20   Reported, Patient   Menthol-Methyl Salicylate (ICY HOT EXTRA STRENGTH) 10-30 % CREA     Reported, Patient   metoprolol tartrate (LOPRESSOR) 25 MG tablet Take 6.25 mg by mouth 3/9/20   Reported, Patient   midodrine (PROAMATINE) 5 MG tablet Take 15 mg by mouth 2/15/20   Reported, Patient   Misc. Devices (ROLLATOR ULTRA-LIGHT) MISC Walker with front wheels for home use. 1/27/20   Reported, Patient   Multiple Vitamins-Minerals (SUPER THERA PRERNA M) TABS Take 1 tablet by mouth 12/29/19   Reported, Patient   Psyllium 58.12 % PACK Bid daily    Reported, Patient   vitamin B1 (VITAMIN B-1) 100 MG tablet Take 100 mg by mouth 3/9/20   Reported, Patient       Allergies  No Known Allergies    Family hx Social hx   Family History   Problem Relation Age of Onset     Coronary Artery Disease Father      Hypertension Maternal Grandfather       Social History     Tobacco Use     Smoking status: Never Smoker     Smokeless tobacco: Never Used   Substance Use Topics     Alcohol use: Not Currently     Comment: Quit 11/2019     Drug use: Not Currently          REVIEW OF SYMPTOMS:  He denies having any recent infections, although he was admitted to the hospital sometime for septicemia.  He has significant fatigue.  He is in a wheelchair.  Denies any headaches or seizures like withdrawal seizures.  Does not have any cough or  "shortness of breath now nor does he have any chest pain.  He has anemia and easy bruising.  Is not known to have diabetes or thyroid disease.  He is not known to have any degenerative joint disease.  Psychiatric wise, he has a prolonged history of alcohol abuse.  As far as the eyes are concerned, no issues.  Likewise, he does not have any hearing issues.  Skin wise, he has no problems except the easy bruising.         Examination  BP 95/61   Pulse 82   Temp 97.9  F (36.6  C) (Oral)   Ht 1.72 m (5' 7.72\")   Wt 68.8 kg (151 lb 11.2 oz)   SpO2 100%   BMI 23.26 kg/m       Gen- well, NAD, A+Ox3, normal color  Eye- EOMI  ENT- MMM, normal oropharynx  Lym- no palpable lymphadenopathy  CVS- S1, S2 normal, no added sounds, RRR  RS- CTA  Abd- Mildly distended with shifting dullness.  Extr- pulses good, no LARISSA  MS- hands normal- no clubbing  Neuro- A+Ox3, no asterixis  Skin- no rash or jaundice  Psych- normal mood    Laboratory  Lab Results   Component Value Date     03/10/2020    POTASSIUM 3.4 03/10/2020    CHLORIDE 102 03/10/2020    CO2 27 03/10/2020    BUN 19 03/10/2020    CR 4.09 03/10/2020       Lab Results   Component Value Date    BILITOTAL 2.0 03/10/2020    ALT 22 03/10/2020    AST 48 03/10/2020    ALKPHOS 237 03/10/2020       Lab Results   Component Value Date    ALBUMIN 2.2 03/10/2020    PROTTOTAL 6.0 03/10/2020        Lab Results   Component Value Date    WBC 8.4 03/10/2020    HGB 7.2 03/10/2020     03/10/2020     03/10/2020       Lab Results   Component Value Date    INR 1.48 03/10/2020     MELD-Na score: 27 at 3/10/2020  7:21 AM  MELD score: 27 at 3/10/2020  7:21 AM  Calculated from:  Serum Creatinine: 4.09 mg/dL (Rounded to 4 mg/dL) at 3/10/2020  7:21 AM  Serum Sodium: 136 mmol/L at 3/10/2020  7:21 AM  Total Bilirubin: 2.0 mg/dL at 3/10/2020  7:21 AM  INR(ratio): 1.48 at 3/10/2020  7:21 AM  Age: 34 years 9 months      Radiology    ASSESSMENT AND PLAN:  Alcoholic liver disease.        . " Javier has alcoholic liver disease, most likely acute on chronic liver disease.  He, in fact, has improved.  As his bilirubin went down, the INR also improved.  He has done a short period of around 18 days of alcohol treatment.  He has been, also, on dialysis now for 9 weeks.  In case he is evaluated or goes forward for evaluation, he will need a liver and kidney, as in my opinion, there is a possibility that his kidney does not come back.  He has only been alcohol free for possibly 2 months.  He will need 6 months of abstinence.  He might need, also, further treatment, and we will need, also, information regarding his previous treatment of chemical dependency.  His chance of relapse is very high, so we await input from our  and her recommendations, and we will follow him here in 3 months.  We did explain that to him and his dad.  In the meantime, he will need rehabilitation, physical, also, as the patient although 34 is still wheelchair bound, and he will do that.  As far as complications from his liver disease are concerned, he will continue doing paracentesis as needed with his hepatologist, and he will need, also, to continue doing surveillance for HCC or worsening of his portal hypertension through upper endoscopies.  Please note that the patient has grade 1 esophageal varices.  I will see him here in 3 months, at which time he will be close to finishing his 6 month sobriety.      This was a 1 hour visit, of which more than 50% was spent in explaining to the patient what our plan of care was.  We answered all his questions.      cc:   Luda Caraballo MD   [facility]    [address] [place] [address]     Brandin Echavarria MD  Hepatology  [facility]

## 2020-03-10 NOTE — PROGRESS NOTES
HPI      ROS      Physical Exam    Assessment and Plan:  1. liver transplant evaluation - patient is a good candidate overall. Benefits and surgical risks of a liver transplantation were discussed.  2.  End stage liver disease due to Laennec's    Surgical evaluation:  1. Portal Vein:Patent  2. Hepatic Artery: Open  3. TIPS: absent  4. Previous Abdominal Surgery: No  5. Hepatocellular Carcinoma: None  6. Ascites: Present - large amount  7. Costal Angle: narrow  8. Portopulmonary Hypertension: absent  9. Hepatopulmonary Syndrome: absent  10. Cardiac Evaluation: needs stress echocardiogram  11. Nutritional Status: Good  12. Diabetes: no  13.Hypertension no  14. Smoker:no  115: Fraility index:yes      Recommendations: Complete evaluation and consider a combined liver kidney transplant [place]      Patients overall evaluation will be discussed at the Liver Transplant selection committee meeting with a final recommendation on the patients suitability for transplant to be made at that time.    Consult Full  Details:  Christo Herrera was seen in consultation at the request of Dr. Echavarria for evaluation as a potential liver transplant recipient.    Reason for Visit:  Christo Herrera is a 34 year old year old male with Laennec's, who presents for liver transplant evaluation.    HPI:  Presenting complaint: Abdominal distention    Patient developed abdominal distention in October 2019.  He was evaluated and diagnosed to have Laënnec cirrhosis.  He has decompensated in the form of ascites and easy fatigability.  There is no history of variceal bleeding or encephalopathy.  He has also developed acute kidney injury and has on dialysis for the last 3 months.  He dialyzes through a line.  He does not give any history of heart disease or lung disease.    Past Medical History:   Diagnosis Date     Ascites      ESRD (end stage renal disease) (H)      GERD (gastroesophageal reflux disease)      HE (hepatic encephalopathy) (H)       History of blood transfusion      Past Surgical History:   Procedure Laterality Date     COLONOSCOPY      [facility] 2020     GI SURGERY       Past Surgical History:   Procedure Laterality Date     COLONOSCOPY      [place] [address]     GI SURGERY       No family history on file.  No Known Allergies  Prior to Admission medications    Medication Sig Start Date End Date Taking? Authorizing Provider   guaiFENesin (MUCINEX) 600 MG 12 hr tablet Take 600 mg by mouth 2/15/20  Yes Reported, Patient   lactulose (CHRONULAC) 10 GM/15ML solution Take 10 g by mouth 2/15/20  Yes Reported, Patient   Menthol-Methyl Salicylate (ICY HOT EXTRA STRENGTH) 10-30 % CREA    Yes Reported, Patient   metoprolol tartrate (LOPRESSOR) 25 MG tablet Take 6.25 mg by mouth 3/9/20  Yes Reported, Patient   midodrine (PROAMATINE) 5 MG tablet Take 15 mg by mouth 2/15/20  Yes Reported, Patient   Misc. Devices (ROLLATOR ULTRA-LIGHT) MISC Walker with front wheels for home use. 1/27/20  Yes Reported, Patient   Multiple Vitamins-Minerals (SUPER THERA PRERNA M) TABS Take 1 tablet by mouth 12/29/19  Yes Reported, Patient   Psyllium 58.12 % PACK Bid daily   Yes Reported, Patient   vitamin B1 (VITAMIN B-1) 100 MG tablet Take 100 mg by mouth 3/9/20  Yes Reported, Patient       Previous Transplant Hx: No    Cardiovascular Hx:       h/o Cardiac Issues: No       Exercise Tolerance: no chest pain or shortness of breath with exertion.    Potential Donor(s): No    ROS:    REVIEW OF SYSTEMS (check box if normal)  [x]                GENERAL  [x]                  PULMONARY [x]                 GENITOURINARY  [x]                 CNS                 [x]                  CARDIAC  [x]                  ENDOCRINE  [x]                 EARS,NOSE,THROAT [x]                  GASTROINTESTINAL [x]                  NEUROLOGIC    [x]                 MUSCLOSKELTAL  [x]                   HEMATOLOGY    Examination:     Vitals:  BP 95/61   Pulse 82   Temp 97.9  F (36.6  C) (Oral)   Ht 1.72  "m (5' 7.72\")   Wt 68.8 kg (151 lb 11.2 oz)   SpO2 100%   BMI 23.26 kg/m      GENERAL APPEARANCE: alert and no distress  EYES: PERRL  HENT: mouth without ulcers or lesions  NECK: supple, no adenopathy  RESP: lungs clear to auscultation - no rales, rhonchi or wheezes  CV: regular rhythm, normal rate, no rub   ABDOMEN:  soft, nontender, large amount of ascites present, striate present,   MS: extremities normal- no gross deformities noted, no evidence of inflammation in joints, no muscle tenderness  SKIN: no rash  NEURO: Normal strength and tone, sensory exam grossly normal, mentation intact and speech normal  PSYCH: mentation appears normal. and affect normal/bright      Results:   Recent Results (from the past 168 hour(s))   EKG 12-lead, tracing only [EKG1]    Collection Time: 03/10/20  6:38 AM   Result Value Ref Range    Interpretation ECG Click View Image link to view waveform and result    Ferritin    Collection Time: 03/10/20  7:21 AM   Result Value Ref Range    Ferritin 563 (H) 26 - 388 ng/mL   CBC with platelets    Collection Time: 03/10/20  7:21 AM   Result Value Ref Range    WBC 8.4 4.0 - 11.0 10e9/L    RBC Count 2.41 (L) 4.4 - 5.9 10e12/L    Hemoglobin 7.2 (L) 13.3 - 17.7 g/dL    Hematocrit 24.8 (L) 40.0 - 53.0 %     (H) 78 - 100 fl    MCH 29.9 26.5 - 33.0 pg    MCHC 29.0 (L) 31.5 - 36.5 g/dL    RDW 16.8 (H) 10.0 - 15.0 %    Platelet Count 161 150 - 450 10e9/L   INR    Collection Time: 03/10/20  7:21 AM   Result Value Ref Range    INR 1.48 (H) 0.86 - 1.14   Phosphorus    Collection Time: 03/10/20  7:21 AM   Result Value Ref Range    Phosphorus 3.3 2.5 - 4.5 mg/dL   Transferrin    Collection Time: 03/10/20  7:21 AM   Result Value Ref Range    Transferrin 94 (L) 210 - 360 mg/dL   TSH with free T4 reflex    Collection Time: 03/10/20  7:21 AM   Result Value Ref Range    TSH 3.52 0.40 - 4.00 mU/L   Iron and iron binding capacity    Collection Time: 03/10/20  7:21 AM   Result Value Ref Range    Iron 24 " (L) 35 - 180 ug/dL    Iron Binding Cap 107 (L) 240 - 430 ug/dL    Iron Saturation Index 23 15 - 46 %   AFP tumor marker    Collection Time: 03/10/20  7:21 AM   Result Value Ref Range    Alpha Fetoprotein <1.5 0 - 8 ug/L   Lipid Profile    Collection Time: 03/10/20  7:21 AM   Result Value Ref Range    Cholesterol 224 (H) <200 mg/dL    Triglycerides 146 <150 mg/dL    HDL Cholesterol 42 >39 mg/dL    LDL Cholesterol Calculated 153 (H) <100 mg/dL    Non HDL Cholesterol 182 (H) <130 mg/dL   Hepatic panel    Collection Time: 03/10/20  7:21 AM   Result Value Ref Range    Bilirubin Direct 1.2 (H) 0.0 - 0.2 mg/dL    Bilirubin Total 2.0 (H) 0.2 - 1.3 mg/dL    Albumin 2.2 (L) 3.4 - 5.0 g/dL    Protein Total 6.0 (L) 6.8 - 8.8 g/dL    Alkaline Phosphatase 237 (H) 40 - 150 U/L    ALT 22 0 - 70 U/L    AST 48 (H) 0 - 45 U/L   Basic metabolic panel    Collection Time: 03/10/20  7:21 AM   Result Value Ref Range    Sodium 136 133 - 144 mmol/L    Potassium 3.4 3.4 - 5.3 mmol/L    Chloride 102 94 - 109 mmol/L    Carbon Dioxide 27 20 - 32 mmol/L    Anion Gap 8 3 - 14 mmol/L    Glucose 116 (H) 70 - 99 mg/dL    Urea Nitrogen 19 7 - 30 mg/dL    Creatinine 4.09 (H) 0.66 - 1.25 mg/dL    GFR Estimate 18 (L) >60 mL/min/[1.73_m2]    GFR Estimate If Black 21 (L) >60 mL/min/[1.73_m2]    Calcium 8.4 (L) 8.5 - 10.1 mg/dL   ABO/Rh type and screen    Collection Time: 03/10/20  7:21 AM   Result Value Ref Range    ABO O     RH(D) Pos     Antibody Screen Neg     Test Valid Only At          [facility]    Specimen Expires 03/13/2020    ABO type [[id number]]    Collection Time: 03/10/20  7:24 AM   Result Value Ref Range    ABO O     RH(D) Pos     Specimen Expires 03/13/2020      I had a long discussion with the patient regarding liver transplantation which included but was not limited to  the following points:    1. Liver transplant selection committee process.  2. The federal rules for cadaveric waiting list, the  size and blood type matching of the organ. The availability of living-related donor transplantation.  3. The types of donors: brain death donors, non-heart beating donors, partial liver grafts: splits and living donor grafts  4. Extended criteria  Donors (older age, steasosis) and the increased  risk of primary non-function using the extended criteria donors  5. The CDC high risk donors,  Risk of donor transmitted infections and donor transmitted malignancy  6. The liver transplant operation and the associated risks and technical complications which can include intraoperative death, post operative death,  Primary non-function, bleeding requiring re-operations, arterial and biliary complications, bowel perforations, and intra abdominal abscess. Some of these complicaitons may require a second operation.  7. The postoperative course, the ICU stay and risk of postoperative complications which can include sepsis, MI, stroke, brain injury, pneumonia, pleural effusions, and renal dysfunction.  8. The current 1 year and 5 year graft and patient survivals.  9. The need for life long immunosuppressive therapy and the side effects of these medications, including the possibility of toxicity, opportunistic infections, risk of cancer including lymphoma, and the possibility of rejection even if the patient is taking the medication exactly as prescribed.  10. The need for compliance with medications and follow-up visits in the clinic and thereafter.  11. The patient and family understand these risks and wish to proceed to transplantation     I had a long discussion with the patient regarding kidney transplantation in general and the following points in particular:    1. Survival statistics at one, five and ten years following kidney transplantation both for living-related and cadaveric allografts.  2. The kidney transplant selection committee process.  3. The complications following kidney transplant that included but were not  limited to wound infection, vascular complications, ureter leak, ureteral strictures, and bowel obstruction  4. The need for lifelong immunosuppressive therapy and the side effects of these medications including specifically the risk of cancer and lymphoma.  5. The waiting list time of approximately a year or more for cadaveric transplants.  6. The statistical superiority of a living-related donor and the compelling reasons to encourage that therapy.    The patient understands these issues quite well and is eager to proceed with our recommendation and with transplantation.    I spent .60 .....minutes with the patient and more than 50% of the time was spend in direct face to face counseling.  CC  YARA EDWARDS    Copy to patient   MEGHAN DEE SR  [address] [place] [address]

## 2020-03-10 NOTE — Clinical Note
J - please reschedule his DSE & txp class for sometime in mid to late April. His dad is aware of this change  A - taking him out of eval for now. He will be back.   Thanks, tk

## 2020-03-10 NOTE — PROGRESS NOTES
"Assessment and Plan:  1. liver transplant evaluation - patient is a good candidate overall. Benefits and surgical risks of a liver transplantation were discussed.  2.  End stage liver disease due to Laennec's    Surgical evaluation:  1. Portal Vein:Patent  2. Hepatic Artery: Open  3. TIPS: absent  4. Previous Abdominal Surgery: No  5. Hepatocellular Carcinoma: None  6. Ascites: Present - large amount  7. Costal Angle: narrow  8. Portopulmonary Hypertension: absent  9. Hepatopulmonary Syndrome: absent  10. Cardiac Evaluation: needs stress echocardiogram  11. Nutritional Status: Good  12. Diabetes: no  13.Hypertension no  14. Smoker:no  115: Fraility index: using wheel chair      Recommendations: {NONE:786558::\"None\"}      Patients overall evaluation will be discussed at the Liver Transplant selection committee meeting with a final recommendation on the patients suitability for transplant to be made at that time.    Consult Full  Details:  Christo Herrera was seen in consultation at the request of  *** for evaluation as a potential {UMP TRANSPLANT LIVER:[id number]} transplant recipient.    Reason for Visit:  Christo Herrera is a 34 year old year old male with {UMP TRANSPLANT LIVER FROM:[id number]}, who presents for {UMP TRANSPLANT LIVER:[id number]} transplant evaluation.    HPI:  Presenting complaint: {UMP TRANSPLANT LIVER HPI:[id number]}    Past Medical History:   Diagnosis Date     Ascites      ESRD (end stage renal disease) (H)      GERD (gastroesophageal reflux disease)      HE (hepatic encephalopathy) (H)      History of blood transfusion      Past Surgical History:   Procedure Laterality Date     COLONOSCOPY      [place] [address]     GI SURGERY       Past Surgical History:   Procedure Laterality Date     COLONOSCOPY      [place] [address]     GI SURGERY       No family history on file.  No Known Allergies  Prior to Admission medications    Medication Sig Start Date End Date Taking? Authorizing Provider " "  guaiFENesin (MUCINEX) 600 MG 12 hr tablet Take 600 mg by mouth 2/15/20   Reported, Patient   lactulose (CHRONULAC) 10 GM/15ML solution Take 10 g by mouth 2/15/20   Reported, Patient   Menthol-Methyl Salicylate (ICY HOT EXTRA STRENGTH) 10-30 % CREA     Reported, Patient   metoprolol tartrate (LOPRESSOR) 25 MG tablet Take 6.25 mg by mouth 3/9/20   Reported, Patient   midodrine (PROAMATINE) 5 MG tablet Take 15 mg by mouth 2/15/20   Reported, Patient   Misc. Devices (ROLLATOR ULTRA-LIGHT) MISC Walker with front wheels for home use. 1/27/20   Reported, Patient   Multiple Vitamins-Minerals (SUPER THERA PRERNA M) TABS Take 1 tablet by mouth 12/29/19   Reported, Patient   Psyllium 58.12 % PACK Bid daily    Reported, Patient   vitamin B1 (VITAMIN B-1) 100 MG tablet Take 100 mg by mouth 3/9/20   Reported, Patient       Previous Transplant Hx: {YES (WILDCARD) /NO:355755}    Cardiovascular Hx:       h/o Cardiac Issues: {YES (WILDCARD) /NO:705217}       Exercise Tolerance: {UMP TX EXERCISE CHITRA:790597} with exertion.    Potential Donor(s): {YES (WILDCARD) /NO:273762}    ROS:    REVIEW OF SYSTEMS (check box if normal)  [x]                GENERAL  [x]                  PULMONARY [x]                 GENITOURINARY  [x]                 CNS                 [x]                  CARDIAC  [x]                  ENDOCRINE  [x]                 EARS,NOSE,THROAT [x]                  GASTROINTESTINAL [x]                  NEUROLOGIC    [x]                 MUSCLOSKELTAL  [x]                   HEMATOLOGY    Examination:     Vitals:  There were no vitals taken for this visit.    {EXAM FAST MW:378915::\"GENERAL APPEARANCE: alert and no distress\",\"EYES: PERRL\",\"HENT: mouth without ulcers or lesions\",\"NECK: supple, no adenopathy\",\"RESP: lungs clear to auscultation - no rales, rhonchi or wheezes\",\"CV: regular rhythm, normal rate, no rub \",\"ABDOMEN:  soft, nontender, no HSM or masses and bowel sounds normal\",\"MS: extremities normal- no gross deformities " "noted, no evidence of inflammation in joints, no muscle tenderness\",\"SKIN: no rash\",\"NEURO: Normal strength and tone, sensory exam grossly normal, mentation intact and speech normal\",\"PSYCH: mentation appears normal. and affect normal/bright\"}      Results:   Recent Results (from the past 168 hour(s))   EKG 12-lead, tracing only [EKG1]    Collection Time: 03/10/20  6:38 AM   Result Value Ref Range    Interpretation ECG Click View Image link to view waveform and result    Ferritin    Collection Time: 03/10/20  7:21 AM   Result Value Ref Range    Ferritin 563 (H) 26 - 388 ng/mL   CBC with platelets    Collection Time: 03/10/20  7:21 AM   Result Value Ref Range    WBC 8.4 4.0 - 11.0 10e9/L    RBC Count 2.41 (L) 4.4 - 5.9 10e12/L    Hemoglobin 7.2 (L) 13.3 - 17.7 g/dL    Hematocrit 24.8 (L) 40.0 - 53.0 %     (H) 78 - 100 fl    MCH 29.9 26.5 - 33.0 pg    MCHC 29.0 (L) 31.5 - 36.5 g/dL    RDW 16.8 (H) 10.0 - 15.0 %    Platelet Count 161 150 - 450 10e9/L   INR    Collection Time: 03/10/20  7:21 AM   Result Value Ref Range    INR 1.48 (H) 0.86 - 1.14   Phosphorus    Collection Time: 03/10/20  7:21 AM   Result Value Ref Range    Phosphorus 3.3 2.5 - 4.5 mg/dL   Transferrin    Collection Time: 03/10/20  7:21 AM   Result Value Ref Range    Transferrin 94 (L) 210 - 360 mg/dL   TSH with free T4 reflex    Collection Time: 03/10/20  7:21 AM   Result Value Ref Range    TSH 3.52 0.40 - 4.00 mU/L   Iron and iron binding capacity    Collection Time: 03/10/20  7:21 AM   Result Value Ref Range    Iron 24 (L) 35 - 180 ug/dL    Iron Binding Cap 107 (L) 240 - 430 ug/dL    Iron Saturation Index 23 15 - 46 %   AFP tumor marker    Collection Time: 03/10/20  7:21 AM   Result Value Ref Range    Alpha Fetoprotein <1.5 0 - 8 ug/L   Lipid Profile    Collection Time: 03/10/20  7:21 AM   Result Value Ref Range    Cholesterol 224 (H) <200 mg/dL    Triglycerides 146 <150 mg/dL    HDL Cholesterol 42 >39 mg/dL    LDL Cholesterol Calculated 153 " (H) <100 mg/dL    Non HDL Cholesterol 182 (H) <130 mg/dL   Hepatic panel    Collection Time: 03/10/20  7:21 AM   Result Value Ref Range    Bilirubin Direct 1.2 (H) 0.0 - 0.2 mg/dL    Bilirubin Total 2.0 (H) 0.2 - 1.3 mg/dL    Albumin 2.2 (L) 3.4 - 5.0 g/dL    Protein Total 6.0 (L) 6.8 - 8.8 g/dL    Alkaline Phosphatase 237 (H) 40 - 150 U/L    ALT 22 0 - 70 U/L    AST 48 (H) 0 - 45 U/L   Basic metabolic panel    Collection Time: 03/10/20  7:21 AM   Result Value Ref Range    Sodium 136 133 - 144 mmol/L    Potassium 3.4 3.4 - 5.3 mmol/L    Chloride 102 94 - 109 mmol/L    Carbon Dioxide 27 20 - 32 mmol/L    Anion Gap 8 3 - 14 mmol/L    Glucose 116 (H) 70 - 99 mg/dL    Urea Nitrogen 19 7 - 30 mg/dL    Creatinine 4.09 (H) 0.66 - 1.25 mg/dL    GFR Estimate 18 (L) >60 mL/min/[1.73_m2]    GFR Estimate If Black 21 (L) >60 mL/min/[1.73_m2]    Calcium 8.4 (L) 8.5 - 10.1 mg/dL   ABO/Rh type and screen    Collection Time: 03/10/20  7:21 AM   Result Value Ref Range    ABO O     RH(D) Pos     Antibody Screen Neg     Test Valid Only At          [facility]    Specimen Expires 03/13/2020    ABO type [[id number]]    Collection Time: 03/10/20  7:24 AM   Result Value Ref Range    ABO O     RH(D) Pos     Specimen Expires 03/13/2020      I had a long discussion with the patient regarding liver transplantation which included but was not limited to  the following points:    1. Liver transplant selection committee process.  2. The federal rules for cadaveric waiting list, the size and blood type matching of the organ. The availability of living-related donor transplantation.  3. The types of donors: brain death donors, non-heart beating donors, partial liver grafts: splits and living donor grafts  4. Extended criteria  Donors (older age, steasosis) and the increased  risk of primary non-function using the extended criteria donors  5. The CDC high risk donors,  Risk of donor transmitted infections and  donor transmitted malignancy  6. The liver transplant operation and the associated risks and technical complications which can include intraoperative death, post operative death,  Primary non-function, bleeding requiring re-operations, arterial and biliary complications, bowel perforations, and intra abdominal abscess. Some of these complicaitons may require a second operation.  7. The postoperative course, the ICU stay and risk of postoperative complications which can include sepsis, MI, stroke, brain injury, pneumonia, pleural effusions, and renal dysfunction.  8. The current 1 year and 5 year graft and patient survivals.  9. The need for life long immunosuppressive therapy and the side effects of these medications, including the possibility of toxicity, opportunistic infections, risk of cancer including lymphoma, and the possibility of rejection even if the patient is taking the medication exactly as prescribed.  10. The need for compliance with medications and follow-up visits in the clinic and thereafter.  11. The patient and family understand these risks and wish to proceed to transplantation       I spent .......minutes with the patient and more than 50% of the time was spend in direct face to face counseling.

## 2020-03-10 NOTE — LETTER
3/10/2020       RE: Christo Herrera  [address] [place] [address]     Dear Colleague,    Thank you for referring your patient, Christo Herrera, to the [facility] SOLID ORGAN TRANSPLANT at [facility]. Please see a copy of my visit note below.    HPI      ROS      Physical Exam    Assessment and Plan:  1. liver transplant evaluation - patient is a good candidate overall. Benefits and surgical risks of a liver transplantation were discussed.  2.  End stage liver disease due to Laennec's    Surgical evaluation:  1. Portal Vein:Patent  2. Hepatic Artery: Open  3. TIPS: absent  4. Previous Abdominal Surgery: No  5. Hepatocellular Carcinoma: None  6. Ascites: Present - large amount  7. Costal Angle: narrow  8. Portopulmonary Hypertension: absent  9. Hepatopulmonary Syndrome: absent  10. Cardiac Evaluation: needs stress echocardiogram  11. Nutritional Status: Good  12. Diabetes: no  13.Hypertension no  14. Smoker:no  115: Fraility index:yes      Recommendations: Complete evaluation and consider a combined liver kidney transplant [place]      Patients overall evaluation will be discussed at the Liver Transplant selection committee meeting with a final recommendation on the patients suitability for transplant to be made at that time.    Consult Full  Details:  Christo Herrera was seen in consultation at the request of Dr. Echavarria for evaluation as a potential liver transplant recipient.    Reason for Visit:  Christo Herrera is a 34 year old year old male with Laennec's, who presents for liver transplant evaluation.    HPI:  Presenting complaint: Abdominal distention    Patient developed abdominal distention in October 2019.  He was evaluated and diagnosed to have Laënnec cirrhosis.  He has decompensated in the form of ascites and easy fatigability.  There is no history of variceal bleeding or encephalopathy.  He has also developed acute kidney injury and has on  dialysis for the last 3 months.  He dialyzes through a line.  He does not give any history of heart disease or lung disease.    Past Medical History:   Diagnosis Date     Ascites      ESRD (end stage renal disease) (H)      GERD (gastroesophageal reflux disease)      HE (hepatic encephalopathy) (H)      History of blood transfusion      Past Surgical History:   Procedure Laterality Date     COLONOSCOPY      [place] [address]     GI SURGERY       Past Surgical History:   Procedure Laterality Date     COLONOSCOPY      [place] [address]     GI SURGERY       No family history on file.  No Known Allergies  Prior to Admission medications    Medication Sig Start Date End Date Taking? Authorizing Provider   guaiFENesin (MUCINEX) 600 MG 12 hr tablet Take 600 mg by mouth 2/15/20  Yes Reported, Patient   lactulose (CHRONULAC) 10 GM/15ML solution Take 10 g by mouth 2/15/20  Yes Reported, Patient   Menthol-Methyl Salicylate (ICY HOT EXTRA STRENGTH) 10-30 % CREA    Yes Reported, Patient   metoprolol tartrate (LOPRESSOR) 25 MG tablet Take 6.25 mg by mouth 3/9/20  Yes Reported, Patient   midodrine (PROAMATINE) 5 MG tablet Take 15 mg by mouth 2/15/20  Yes Reported, Patient   Misc. Devices (ROLLATOR ULTRA-LIGHT) MISC Walker with front wheels for home use. 1/27/20  Yes Reported, Patient   Multiple Vitamins-Minerals (SUPER THERA PRERNA M) TABS Take 1 tablet by mouth 12/29/19  Yes Reported, Patient   Psyllium 58.12 % PACK Bid daily   Yes Reported, Patient   vitamin B1 (VITAMIN B-1) 100 MG tablet Take 100 mg by mouth 3/9/20  Yes Reported, Patient       Previous Transplant Hx: No    Cardiovascular Hx:       h/o Cardiac Issues: No       Exercise Tolerance: no chest pain or shortness of breath with exertion.    Potential Donor(s): No    ROS:    REVIEW OF SYSTEMS (check box if normal)  [x]                GENERAL  [x]                  PULMONARY [x]                 GENITOURINARY  [x]                 CNS                 [x]                   "CARDIAC  [x]                  ENDOCRINE  [x]                 EARS,NOSE,THROAT [x]                  GASTROINTESTINAL [x]                  NEUROLOGIC    [x]                 MUSCLOSKELTAL  [x]                   HEMATOLOGY    Examination:     Vitals:  BP 95/61   Pulse 82   Temp 97.9  F (36.6  C) (Oral)   Ht 1.72 m (5' 7.72\")   Wt 68.8 kg (151 lb 11.2 oz)   SpO2 100%   BMI 23.26 kg/m      GENERAL APPEARANCE: alert and no distress  EYES: PERRL  HENT: mouth without ulcers or lesions  NECK: supple, no adenopathy  RESP: lungs clear to auscultation - no rales, rhonchi or wheezes  CV: regular rhythm, normal rate, no rub   ABDOMEN:  soft, nontender, large amount of ascites present, striate present,   MS: extremities normal- no gross deformities noted, no evidence of inflammation in joints, no muscle tenderness  SKIN: no rash  NEURO: Normal strength and tone, sensory exam grossly normal, mentation intact and speech normal  PSYCH: mentation appears normal. and affect normal/bright      Results:   Recent Results (from the past 168 hour(s))   EKG 12-lead, tracing only [EKG1]    Collection Time: 03/10/20  6:38 AM   Result Value Ref Range    Interpretation ECG Click View Image link to view waveform and result    Ferritin    Collection Time: 03/10/20  7:21 AM   Result Value Ref Range    Ferritin 563 (H) 26 - 388 ng/mL   CBC with platelets    Collection Time: 03/10/20  7:21 AM   Result Value Ref Range    WBC 8.4 4.0 - 11.0 10e9/L    RBC Count 2.41 (L) 4.4 - 5.9 10e12/L    Hemoglobin 7.2 (L) 13.3 - 17.7 g/dL    Hematocrit 24.8 (L) 40.0 - 53.0 %     (H) 78 - 100 fl    MCH 29.9 26.5 - 33.0 pg    MCHC 29.0 (L) 31.5 - 36.5 g/dL    RDW 16.8 (H) 10.0 - 15.0 %    Platelet Count 161 150 - 450 10e9/L   INR    Collection Time: 03/10/20  7:21 AM   Result Value Ref Range    INR 1.48 (H) 0.86 - 1.14   Phosphorus    Collection Time: 03/10/20  7:21 AM   Result Value Ref Range    Phosphorus 3.3 2.5 - 4.5 mg/dL   Transferrin    Collection " Time: 03/10/20  7:21 AM   Result Value Ref Range    Transferrin 94 (L) 210 - 360 mg/dL   TSH with free T4 reflex    Collection Time: 03/10/20  7:21 AM   Result Value Ref Range    TSH 3.52 0.40 - 4.00 mU/L   Iron and iron binding capacity    Collection Time: 03/10/20  7:21 AM   Result Value Ref Range    Iron 24 (L) 35 - 180 ug/dL    Iron Binding Cap 107 (L) 240 - 430 ug/dL    Iron Saturation Index 23 15 - 46 %   AFP tumor marker    Collection Time: 03/10/20  7:21 AM   Result Value Ref Range    Alpha Fetoprotein <1.5 0 - 8 ug/L   Lipid Profile    Collection Time: 03/10/20  7:21 AM   Result Value Ref Range    Cholesterol 224 (H) <200 mg/dL    Triglycerides 146 <150 mg/dL    HDL Cholesterol 42 >39 mg/dL    LDL Cholesterol Calculated 153 (H) <100 mg/dL    Non HDL Cholesterol 182 (H) <130 mg/dL   Hepatic panel    Collection Time: 03/10/20  7:21 AM   Result Value Ref Range    Bilirubin Direct 1.2 (H) 0.0 - 0.2 mg/dL    Bilirubin Total 2.0 (H) 0.2 - 1.3 mg/dL    Albumin 2.2 (L) 3.4 - 5.0 g/dL    Protein Total 6.0 (L) 6.8 - 8.8 g/dL    Alkaline Phosphatase 237 (H) 40 - 150 U/L    ALT 22 0 - 70 U/L    AST 48 (H) 0 - 45 U/L   Basic metabolic panel    Collection Time: 03/10/20  7:21 AM   Result Value Ref Range    Sodium 136 133 - 144 mmol/L    Potassium 3.4 3.4 - 5.3 mmol/L    Chloride 102 94 - 109 mmol/L    Carbon Dioxide 27 20 - 32 mmol/L    Anion Gap 8 3 - 14 mmol/L    Glucose 116 (H) 70 - 99 mg/dL    Urea Nitrogen 19 7 - 30 mg/dL    Creatinine 4.09 (H) 0.66 - 1.25 mg/dL    GFR Estimate 18 (L) >60 mL/min/[1.73_m2]    GFR Estimate If Black 21 (L) >60 mL/min/[1.73_m2]    Calcium 8.4 (L) 8.5 - 10.1 mg/dL   ABO/Rh type and screen    Collection Time: 03/10/20  7:21 AM   Result Value Ref Range    ABO O     RH(D) Pos     Antibody Screen Neg     Test Valid Only At          [facility]    Specimen Expires 03/13/2020    ABO type [[id number]]    Collection Time: 03/10/20  7:24 AM   Result Value  Ref Range    ABO O     RH(D) Pos     Specimen Expires 03/13/2020      I had a long discussion with the patient regarding liver transplantation which included but was not limited to  the following points:    1. Liver transplant selection committee process.  2. The federal rules for cadaveric waiting list, the size and blood type matching of the organ. The availability of living-related donor transplantation.  3. The types of donors: brain death donors, non-heart beating donors, partial liver grafts: splits and living donor grafts  4. Extended criteria  Donors (older age, steasosis) and the increased  risk of primary non-function using the extended criteria donors  5. The [facility] high risk donors,  Risk of donor transmitted infections and donor transmitted malignancy  6. The liver transplant operation and the associated risks and technical complications which can include intraoperative death, post operative death,  Primary non-function, bleeding requiring re-operations, arterial and biliary complications, bowel perforations, and intra abdominal abscess. Some of these complicaitons may require a second operation.  7. The postoperative course, the ICU stay and risk of postoperative complications which can include sepsis, MI, stroke, brain injury, pneumonia, pleural effusions, and renal dysfunction.  8. The current 1 year and 5 year graft and patient survivals.  9. The need for life long immunosuppressive therapy and the side effects of these medications, including the possibility of toxicity, opportunistic infections, risk of cancer including lymphoma, and the possibility of rejection even if the patient is taking the medication exactly as prescribed.  10. The need for compliance with medications and follow-up visits in the clinic and thereafter.  11. The patient and family understand these risks and wish to proceed to transplantation     I had a long discussion with the patient regarding kidney transplantation in general  and the following points in particular:    1. Survival statistics at one, five and ten years following kidney transplantation both for living-related and cadaveric allografts.  2. The kidney transplant selection committee process.  3. The complications following kidney transplant that included but were not limited to wound infection, vascular complications, ureter leak, ureteral strictures, and bowel obstruction  4. The need for lifelong immunosuppressive therapy and the side effects of these medications including specifically the risk of cancer and lymphoma.  5. The waiting list time of approximately a year or more for cadaveric transplants.  6. The statistical superiority of a living-related donor and the compelling reasons to encourage that therapy.    The patient understands these issues quite well and is eager to proceed with our recommendation and with transplantation.    I spent .60 .....minutes with the patient and more than 50% of the time was spend in direct face to face counseling.    Again, thank you for allowing me to participate in the care of your patient.      Sincerely,    MD PORTIA Morin MOHAMED ABDIRAHMAN H    Copy to patient   MEGHAN DEE SR  [address] [place] [address]

## 2020-03-11 LAB
BACTERIA SPEC CULT: NORMAL
BLD GP AB SCN TITR SERPL: NORMAL {TITER}
GAMMA INTERFERON BACKGROUND BLD IA-ACNC: 0.02 IU/ML
M TB IFN-G BLD-IMP: NEGATIVE
M TB IFN-G CD4+ BCKGRND COR BLD-ACNC: 1.31 IU/ML
MITOGEN IGNF BCKGRD COR BLD-ACNC: 0 IU/ML
MITOGEN IGNF BCKGRD COR BLD-ACNC: 0 IU/ML
SPECIMEN SOURCE: NORMAL

## 2020-03-12 ENCOUNTER — ANCILLARY PROCEDURE (OUTPATIENT)
Dept: ULTRASOUND IMAGING | Facility: CLINIC | Age: 35
End: 2020-03-12
Attending: INTERNAL MEDICINE
Payer: COMMERCIAL

## 2020-03-12 ENCOUNTER — OFFICE VISIT (OUTPATIENT)
Dept: INFUSION THERAPY | Facility: CLINIC | Age: 35
End: 2020-03-12
Attending: INTERNAL MEDICINE
Payer: COMMERCIAL

## 2020-03-12 VITALS
TEMPERATURE: 98.2 F | RESPIRATION RATE: 16 BRPM | DIASTOLIC BLOOD PRESSURE: 64 MMHG | HEART RATE: 85 BPM | WEIGHT: 142.7 LBS | SYSTOLIC BLOOD PRESSURE: 97 MMHG | OXYGEN SATURATION: 100 % | BODY MASS INDEX: 21.88 KG/M2

## 2020-03-12 DIAGNOSIS — K70.31 ALCOHOLIC CIRRHOSIS OF LIVER WITH ASCITES (H): Primary | ICD-10-CM

## 2020-03-12 PROCEDURE — P9047 ALBUMIN (HUMAN), 25%, 50ML: HCPCS | Mod: ZF | Performed by: INTERNAL MEDICINE

## 2020-03-12 PROCEDURE — 49083 ABD PARACENTESIS W/IMAGING: CPT

## 2020-03-12 PROCEDURE — 25000125 ZZHC RX 250: Mod: ZF | Performed by: INTERNAL MEDICINE

## 2020-03-12 PROCEDURE — 25000128 H RX IP 250 OP 636: Mod: ZF | Performed by: INTERNAL MEDICINE

## 2020-03-12 RX ORDER — ALBUMIN (HUMAN) 12.5 G/50ML
12.5 SOLUTION INTRAVENOUS
Status: COMPLETED | OUTPATIENT
Start: 2020-03-12 | End: 2020-03-12

## 2020-03-12 RX ORDER — LIDOCAINE HYDROCHLORIDE 10 MG/ML
20 INJECTION, SOLUTION EPIDURAL; INFILTRATION; INTRACAUDAL; PERINEURAL ONCE
Status: COMPLETED | OUTPATIENT
Start: 2020-03-12 | End: 2020-03-12

## 2020-03-12 RX ORDER — ALBUMIN (HUMAN) 12.5 G/50ML
12.5 SOLUTION INTRAVENOUS
Status: CANCELLED | OUTPATIENT
Start: 2020-03-12

## 2020-03-12 RX ORDER — LIDOCAINE HYDROCHLORIDE 10 MG/ML
20 INJECTION, SOLUTION EPIDURAL; INFILTRATION; INTRACAUDAL; PERINEURAL ONCE
Status: CANCELLED | OUTPATIENT
Start: 2020-03-12

## 2020-03-12 RX ADMIN — ALBUMIN HUMAN 12.5 G: 0.25 SOLUTION INTRAVENOUS at 12:26

## 2020-03-12 RX ADMIN — ALBUMIN HUMAN 12.5 G: 0.25 SOLUTION INTRAVENOUS at 12:34

## 2020-03-12 RX ADMIN — LIDOCAINE HYDROCHLORIDE 10 ML: 10 INJECTION, SOLUTION EPIDURAL; INFILTRATION; INTRACAUDAL; PERINEURAL at 12:11

## 2020-03-12 RX ADMIN — ALBUMIN HUMAN 12.5 G: 0.25 SOLUTION INTRAVENOUS at 12:10

## 2020-03-12 RX ADMIN — ALBUMIN HUMAN 12.5 G: 0.25 SOLUTION INTRAVENOUS at 12:17

## 2020-03-12 NOTE — PROGRESS NOTES
Paracentesis Nursing Note  Christo Herrera presents today to [facility] for a paracentesis.    During today's appointment orders from Brandin Echavarria MD were completed.    Progress Note:  Patient identification verified by name and date of birth.  Assessment completed.  Vitals monitored throughout appointment and recorded in Doc Flowsheets.  See proceduralist note in ultrasound.    Vascular Access: peripheral IV placed today.  Labs: were not ordered for this appointment.    Date of consent or authorization: 3/12/20.  Invasive Procedure Safety Checklist was completed and sent for scanning.     Paracentesis performed by Guy Truong PA-C Radiology.    The following labs were communicated to provider performing paracentesis:  Lab Results   Component Value Date     03/10/2020       Total amount of ascites fluid drained: 6 liters.  Color of ascites fluid: yellow.  Total amount of albumin given: 50  grams.    Patient tolerated procedure well.    Post procedure,denies pain or discomfort post paracentesis.      Discharge Plan:  Discharge instructions were reviewed with patient.  Patient/Representative verbalized understanding and all questions were answered.   Discharged from [facility] in stable condition.    Nadeen Sher RN       Administrations This Visit     albumin human 25 % injection 12.5 g     Admin Date  03/12/2020 Action  New Bag Dose  12.5 g Route  Intravenous Administered By  Nadeen Sher RN           Admin Date  03/12/2020 Action  New Bag Dose  12.5 g Route  Intravenous Administered By  Nadeen Sher RN           Admin Date  03/12/2020 Action  New Bag Dose  12.5 g Route  Intravenous Administered By  Nadeen Sher RN           Admin Date  03/12/2020 Action  New Bag Dose  12.5 g Route  Intravenous Administered By  Nadeen Sher RN          lidocaine (PF) (XYLOCAINE) 1 % injection 20 mL     Admin  Date  03/12/2020 Action  Given by Other Dose  10 mL Route  Subcutaneous Administered By  Nadeen Sher, HARSHAD                  Temp 98.2  F (36.8  C) (Oral)   Resp 16   Wt 70.7 kg (155 lb 12.8 oz)   SpO2 100%   BMI 23.89 kg/m

## 2020-03-12 NOTE — PATIENT INSTRUCTIONS
Patient Education     Discharge Instructions for Paracentesis  Paracentesis is a procedure to remove extra fluid from your belly (abdomen). This fluid buildup in the abdomen is called ascites. The procedure may have been done to take a sample of the fluid. Or, it may have been done to drain the extra fluid from your abdomen and help make you more comfortable.     Ascites is buildup of excess fluid in the abdomen.   Home care    If you have pain after the procedure, your healthcare provider can prescribe or recommend pain medicines. Take these exactly as directed. If you stopped taking other medicines before the procedure, ask your provider when you can start them again.    Take it easy for 24 hours after the procedure. Avoid physical activity until your provider says it s OK.    You will have a small bandage over the puncture site. Stitches (sutures), surgical staples, adhesive tapes, adhesive strips, or surgical glue may be used to close the incision. They also help stop bleeding and speed healing. You may take the bandage off in 24 hours.    Check the puncture site for the signs of infection listed below.  Follow-up care  Make a follow-up appointment with your healthcare provider as directed. During your follow-up visit, your provider will check your healing. Let your provider know how you are feeling. You can also discuss the cause of your ascites and if you need any further treatment.  When to seek medical advice  Call your healthcare provider if you have any of the following after the procedure:    A fever of 100.4 F (38 C) or higher    Trouble breathing    Pain that doesn't go away even after taking pain medicine    Belly pain not caused by having the skin punctured    Bleeding from the puncture site    More than a small amount of fluid leaking from the puncture site    Swollen belly    Signs of infection at the puncture site. These include increased pain, redness, or swelling, warmth, or bad-smelling  drainage.    Blood in your urine    Feeling dizzy or lightheaded, or fainting   Date Last Reviewed: 7/1/2016 2000-2019 [employer] [address] [place] [address], [place] [address]. All rights reserved. This information is not intended as a substitute for professional medical care. Always follow your healthcare professional's instructions.

## 2020-03-16 ENCOUNTER — TELEPHONE (OUTPATIENT)
Dept: GASTROENTEROLOGY | Facility: CLINIC | Age: 35
End: 2020-03-16

## 2020-03-16 DIAGNOSIS — N39.0 URINARY TRACT INFECTION: ICD-10-CM

## 2020-03-16 DIAGNOSIS — K70.31 ALCOHOLIC CIRRHOSIS OF LIVER WITH ASCITES (H): Primary | ICD-10-CM

## 2020-03-16 RX ORDER — CIPROFLOXACIN 250 MG/1
TABLET, FILM COATED ORAL
Qty: 7 TABLET | Refills: 0 | Status: SHIPPED | OUTPATIENT
Start: 2020-03-16 | End: 2021-08-11

## 2020-03-16 NOTE — TELEPHONE ENCOUNTER
----- Message from Brandin Echavarria MD sent at 3/14/2020  9:20 PM CDT -----  Attached are your labs. They show abnormal liver tests,  low vitamin D, urinary infection, and kidney failure on dialysis. It also shows anemia. You need to be on vitamin D and start antibiotics for the urinary infection.

## 2020-03-16 NOTE — TELEPHONE ENCOUNTER
Called patient and spoke with his brother.  Let him know labs reviewed.  They show abnormal liver tests,  low vitamin D, urinary infection, and kidney failure on dialysis. It also shows anemia. You need to be on vitamin D and start antibiotics for the urinary infection.    Patient will discuss with primary provider about starting a vitamin D supplement.        Prescription Ciprofloxacin 250 mg every 24 hours sent to [facility].     No further questions or concerns.    Mariluz NELSON LPN

## 2020-03-19 ENCOUNTER — OFFICE VISIT (OUTPATIENT)
Dept: INFUSION THERAPY | Facility: CLINIC | Age: 35
End: 2020-03-19
Attending: INTERNAL MEDICINE
Payer: COMMERCIAL

## 2020-03-19 ENCOUNTER — ANCILLARY PROCEDURE (OUTPATIENT)
Dept: ULTRASOUND IMAGING | Facility: CLINIC | Age: 35
End: 2020-03-19
Attending: INTERNAL MEDICINE
Payer: COMMERCIAL

## 2020-03-19 VITALS
WEIGHT: 139.7 LBS | TEMPERATURE: 98 F | OXYGEN SATURATION: 100 % | DIASTOLIC BLOOD PRESSURE: 55 MMHG | SYSTOLIC BLOOD PRESSURE: 92 MMHG | HEART RATE: 69 BPM | BODY MASS INDEX: 21.42 KG/M2

## 2020-03-19 DIAGNOSIS — K70.31 ALCOHOLIC CIRRHOSIS OF LIVER WITH ASCITES (H): Primary | ICD-10-CM

## 2020-03-19 PROCEDURE — 25000125 ZZHC RX 250: Mod: ZF | Performed by: INTERNAL MEDICINE

## 2020-03-19 PROCEDURE — 25000128 H RX IP 250 OP 636: Mod: ZF | Performed by: INTERNAL MEDICINE

## 2020-03-19 PROCEDURE — P9047 ALBUMIN (HUMAN), 25%, 50ML: HCPCS | Mod: ZF | Performed by: INTERNAL MEDICINE

## 2020-03-19 PROCEDURE — 27210190 US PARACENTESIS

## 2020-03-19 RX ORDER — LIDOCAINE HYDROCHLORIDE 10 MG/ML
20 INJECTION, SOLUTION EPIDURAL; INFILTRATION; INTRACAUDAL; PERINEURAL ONCE
Status: CANCELLED | OUTPATIENT
Start: 2020-03-19

## 2020-03-19 RX ORDER — ALBUMIN (HUMAN) 12.5 G/50ML
12.5 SOLUTION INTRAVENOUS
Status: CANCELLED | OUTPATIENT
Start: 2020-03-19

## 2020-03-19 RX ORDER — ALBUMIN (HUMAN) 12.5 G/50ML
12.5 SOLUTION INTRAVENOUS
Status: COMPLETED | OUTPATIENT
Start: 2020-03-19 | End: 2020-03-19

## 2020-03-19 RX ORDER — LIDOCAINE HYDROCHLORIDE 10 MG/ML
20 INJECTION, SOLUTION EPIDURAL; INFILTRATION; INTRACAUDAL; PERINEURAL ONCE
Status: COMPLETED | OUTPATIENT
Start: 2020-03-19 | End: 2020-03-19

## 2020-03-19 RX ADMIN — ALBUMIN HUMAN 12.5 G: 0.25 SOLUTION INTRAVENOUS at 09:54

## 2020-03-19 RX ADMIN — ALBUMIN HUMAN 12.5 G: 0.25 SOLUTION INTRAVENOUS at 10:06

## 2020-03-19 RX ADMIN — ALBUMIN HUMAN 12.5 G: 0.25 SOLUTION INTRAVENOUS at 10:32

## 2020-03-19 RX ADMIN — LIDOCAINE HYDROCHLORIDE 10 ML: 10 INJECTION, SOLUTION EPIDURAL; INFILTRATION; INTRACAUDAL; PERINEURAL at 09:54

## 2020-03-19 RX ADMIN — ALBUMIN HUMAN 12.5 G: 0.25 SOLUTION INTRAVENOUS at 10:16

## 2020-03-19 NOTE — PROGRESS NOTES
Paracentesis Nursing Note  Christo Herrera presents today to [facility] for a paracentesis.    During today's appointment orders from Brandin Echavarria MD were completed.    Progress Note:  Patient identification verified by name and date of birth.  Assessment completed.  Vitals monitored throughout appointment and recorded in Doc Flowsheets.  See proceduralist note in ultrasound.    Vascular Access: peripheral IV placed today.  Labs: were not ordered for this appointment.    Date of consent or authorization: 3/12/20.  Invasive Procedure Safety Checklist was completed and sent for scanning.     Paracentesis performed by Vinicius Cottrell PA-C Radiology.    The following labs were communicated to provider performing paracentesis:  Lab Results   Component Value Date     03/10/2020       Total amount of ascites fluid drained: 6 liters.  Color of ascites fluid: yellow.  Total amount of albumin given: 50  grams.    Patient tolerated procedure well.    Post procedure,denies pain or discomfort post paracentesis.      Discharge Plan:  Discharge instructions were reviewed with patient.  Patient/Representative verbalized understanding and all questions were answered.   Discharged from [facility] in stable condition.    Nadeen Sher RN    Administrations This Visit     albumin human 25 % injection 12.5 g     Admin Date  03/19/2020 Action  New Bag Dose  12.5 g Route  Intravenous Administered By  Nadeen Sher RN           Admin Date  03/19/2020 Action  New Bag Dose  12.5 g Route  Intravenous Administered By  Nadeen Sher RN           Admin Date  03/19/2020 Action  New Bag Dose  12.5 g Route  Intravenous Administered By  Nadeen Sher RN           Admin Date  03/19/2020 Action  New Bag Dose  12.5 g Route  Intravenous Administered By  Nadeen Sher RN          lidocaine (PF) (XYLOCAINE) 1 % injection 20 mL     Admin Date  03/19/2020  Action  Given by Other Dose  10 mL Route  Subcutaneous Administered By  Nadeen Sher, HARSHAD                BP 96/62   Pulse 84   Temp 98  F (36.7  C)   Wt 69.4 kg (152 lb 14.4 oz)   SpO2 100%   BMI 23.44 kg/m

## 2020-03-20 LAB — PETH BLD-MCNC: NEGATIVE NG/ML

## 2020-03-24 ENCOUNTER — AMBULATORY - HEALTHEAST (OUTPATIENT)
Dept: SCHEDULING | Facility: CLINIC | Age: 35
End: 2020-03-24

## 2020-03-24 DIAGNOSIS — K70.31 ALCOHOLIC CIRRHOSIS OF LIVER WITH ASCITES (H): Primary | ICD-10-CM

## 2020-03-24 DIAGNOSIS — K70.31 ALCOHOLIC CIRRHOSIS OF LIVER WITH ASCITES (H): ICD-10-CM

## 2020-03-30 ENCOUNTER — TELEPHONE (OUTPATIENT)
Dept: TRANSPLANT | Facility: CLINIC | Age: 35
End: 2020-03-30

## 2020-03-30 NOTE — TELEPHONE ENCOUNTER
Transplant Social Work Services Phone Call      Data: Received a voice message from patient's father Nelson reporting difficulty obtaining a discharge summary from patient's substance abuse treatment at [facility].    Intervention: I attempted to reach Nelson back but was unable.  I left him a voice message and encouraged him to call me back.  Assessment: deferred  Education provided by SW: deferred  Plan: Awaiting call back from patient's father Nelson.      KAYA Lacey, [facility]  Liver Transplant   Phone [phone]  Pager [phone]

## 2020-04-02 ENCOUNTER — HOSPITAL ENCOUNTER (OUTPATIENT)
Dept: ULTRASOUND IMAGING | Facility: CLINIC | Age: 35
Discharge: HOME | End: 2020-04-02
Admitting: RADIOLOGY

## 2020-04-02 ENCOUNTER — TRANSFERRED RECORDS (OUTPATIENT)
Dept: HEALTH INFORMATION MANAGEMENT | Facility: CLINIC | Age: 35
End: 2020-04-02

## 2020-04-02 DIAGNOSIS — K70.31 ALCOHOLIC CIRRHOSIS OF LIVER WITH ASCITES (H): ICD-10-CM

## 2020-04-03 ENCOUNTER — RECORDS - HEALTHEAST (OUTPATIENT)
Dept: ADMINISTRATIVE | Facility: OTHER | Age: 35
End: 2020-04-03

## 2020-04-16 ENCOUNTER — RECORDS - HEALTHEAST (OUTPATIENT)
Dept: ADMINISTRATIVE | Facility: OTHER | Age: 35
End: 2020-04-16

## 2020-04-16 ENCOUNTER — TELEPHONE (OUTPATIENT)
Dept: TRANSPLANT | Facility: CLINIC | Age: 35
End: 2020-04-16

## 2020-04-16 NOTE — TELEPHONE ENCOUNTER
Transplant Social Work Services Phone Call      Data: Received call from patient's father Crhisto.  He reports he has been unable to obtain a copy of patient's discharge summary from substance abuse treatment at [facility].    Intervention: I discussed with Christo the importance of his son Christo engaging in relapse prevention.  Assessment: The patient Christo' last consumption is documented as January 13, 2020 from medical records.  He completed treatment at [facility] in February of this year per patient and father's report.  He has not engaged in any form of relapse prevention.  I provided information about [facility]'s Telephone Coaching Program in March when I met with patient and his father.  Patient has not been using this resource.    Education provided by SW: [facility]'s telephone coaching program (mailed consent form to patient)  Plan: I will remain involved throughout patient's liver transplant evaluation.        KAYA Lacey, [facility]  Liver Transplant   Phone [phone]  Pager [phone]

## 2020-04-21 ENCOUNTER — HOSPITAL ENCOUNTER (OUTPATIENT)
Dept: ULTRASOUND IMAGING | Facility: CLINIC | Age: 35
Discharge: HOME | End: 2020-04-21
Attending: INTERNAL MEDICINE | Admitting: RADIOLOGY

## 2020-04-21 DIAGNOSIS — R18.8 ASCITES: ICD-10-CM

## 2020-04-21 LAB
ALT SERPL W P-5'-P-CCNC: 16 U/L (ref 0–45)
ANION GAP SERPL CALCULATED.3IONS-SCNC: 11 MMOL/L (ref 5–18)
BASOPHILS # BLD AUTO: 0.1 THOU/UL (ref 0–0.2)
BASOPHILS NFR BLD AUTO: 1 % (ref 0–2)
BUN SERPL-MCNC: 18 MG/DL (ref 8–22)
CALCIUM SERPL-MCNC: 9.2 MG/DL (ref 8.5–10.5)
CHLORIDE BLD-SCNC: 103 MMOL/L (ref 98–107)
CO2 SERPL-SCNC: 26 MMOL/L (ref 22–31)
CREAT SERPL-MCNC: 1.81 MG/DL (ref 0.7–1.3)
EOSINOPHIL # BLD AUTO: 0.3 THOU/UL (ref 0–0.4)
EOSINOPHIL NFR BLD AUTO: 5 % (ref 0–6)
ERYTHROCYTE [DISTWIDTH] IN BLOOD BY AUTOMATED COUNT: 17 % (ref 11–14.5)
GFR SERPL CREATININE-BSD FRML MDRD: 43 ML/MIN/1.73M2
GLUCOSE BLD-MCNC: 93 MG/DL (ref 70–125)
HCT VFR BLD AUTO: 32.7 % (ref 40–54)
HGB BLD-MCNC: 9.6 G/DL (ref 14–18)
INR PPP: 1.53 (ref 0.9–1.1)
LYMPHOCYTES # BLD AUTO: 2 THOU/UL (ref 0.8–4.4)
LYMPHOCYTES NFR BLD AUTO: 39 % (ref 20–40)
MCH RBC QN AUTO: 27.1 PG (ref 27–34)
MCHC RBC AUTO-ENTMCNC: 29.4 G/DL (ref 32–36)
MCV RBC AUTO: 92 FL (ref 80–100)
MONOCYTES # BLD AUTO: 0.6 THOU/UL (ref 0–0.9)
MONOCYTES NFR BLD AUTO: 11 % (ref 2–10)
NEUTROPHILS # BLD AUTO: 2.3 THOU/UL (ref 2–7.7)
NEUTROPHILS NFR BLD AUTO: 44 % (ref 50–70)
PLATELET # BLD AUTO: 146 THOU/UL (ref 140–440)
PMV BLD AUTO: 10.9 FL (ref 8.5–12.5)
POTASSIUM BLD-SCNC: 3.8 MMOL/L (ref 3.5–5)
RBC # BLD AUTO: 3.54 MILL/UL (ref 4.4–6.2)
SODIUM SERPL-SCNC: 140 MMOL/L (ref 136–145)
WBC: 5.2 THOU/UL (ref 4–11)

## 2020-04-23 ENCOUNTER — DOCUMENTATION ONLY (OUTPATIENT)
Dept: TRANSPLANT | Facility: CLINIC | Age: 35
End: 2020-04-23

## 2020-04-23 NOTE — PROGRESS NOTES
April 23, 2020 12:52 PM -  [id number]:   - [place] 5/5  - TERA 5/5 (or before listing)  -  final ok  - txp nephr - SLK or safety net    MELD 27 (with HD points)  MELD 19 (without HD points)

## 2020-04-24 ENCOUNTER — TELEPHONE (OUTPATIENT)
Dept: TRANSPLANT | Facility: CLINIC | Age: 35
End: 2020-04-24

## 2020-04-24 NOTE — TELEPHONE ENCOUNTER
Left message for patient to reschedule DSE appointments and move Dr. Echavarria appt to virtual visit.  Asked patient to call back to schedule.

## 2020-04-27 ENCOUNTER — TELEPHONE (OUTPATIENT)
Dept: TRANSPLANT | Facility: CLINIC | Age: 35
End: 2020-04-27

## 2020-04-27 LAB — ARUP MISCELLANEOUS TEST: NORMAL

## 2020-04-27 NOTE — TELEPHONE ENCOUNTER
Patient Call: General    Reason for call: father returning call from last Friday to set up an appointment and is wanting on a return call.     Call back needed? Yes    Return Call Needed  Same as documented in contacts section  When to return call?: Same day: Route High Priority

## 2020-04-28 LAB
MISCELLANEOUS TEST DEPT. - HE HISTORICAL: NORMAL
PERFORMING LAB: NORMAL
SPECIMEN STATUS: NORMAL
TEST NAME: NORMAL

## 2020-05-04 NOTE — TELEPHONE ENCOUNTER
Patient is getting dialysis cath taken out on 5/5.    Will push back labs & Dr. Echavarria to sometime end of May or early April.    Spoke to father about this.

## 2020-05-04 NOTE — TELEPHONE ENCOUNTER
Spoke with the patient and confirmed Lab Draw, Hepatology and DSE  appointments on 6/16/20.  Informed patient an itinerary can be accessed on [place], and will be sent via mail.

## 2020-05-27 ENCOUNTER — AMBULATORY - HEALTHEAST (OUTPATIENT)
Dept: ULTRASOUND IMAGING | Facility: CLINIC | Age: 35
End: 2020-05-27

## 2020-05-27 ENCOUNTER — RECORDS - HEALTHEAST (OUTPATIENT)
Dept: ADMINISTRATIVE | Facility: OTHER | Age: 35
End: 2020-05-27

## 2020-05-27 DIAGNOSIS — Z11.59 ENCOUNTER FOR SCREENING FOR OTHER VIRAL DISEASES: ICD-10-CM

## 2020-06-01 ENCOUNTER — AMBULATORY - HEALTHEAST (OUTPATIENT)
Dept: FAMILY MEDICINE | Facility: CLINIC | Age: 35
End: 2020-06-01

## 2020-06-01 DIAGNOSIS — Z11.59 ENCOUNTER FOR SCREENING FOR OTHER VIRAL DISEASES: ICD-10-CM

## 2020-06-04 ENCOUNTER — HOSPITAL ENCOUNTER (OUTPATIENT)
Dept: ULTRASOUND IMAGING | Facility: CLINIC | Age: 35
Discharge: HOME | End: 2020-06-04
Attending: INTERNAL MEDICINE | Admitting: RADIOLOGY

## 2020-06-04 DIAGNOSIS — K70.31 ALCOHOLIC CIRRHOSIS OF LIVER WITH ASCITES (H): ICD-10-CM

## 2020-06-11 ENCOUNTER — TELEPHONE (OUTPATIENT)
Dept: TRANSPLANT | Facility: CLINIC | Age: 35
End: 2020-06-11

## 2020-06-11 DIAGNOSIS — K70.31 ALCOHOLIC CIRRHOSIS OF LIVER WITH ASCITES (H): Primary | ICD-10-CM

## 2020-06-11 NOTE — TELEPHONE ENCOUNTER
In light of the COVID 19 pandemic, The [facility] cares about your safety and we are calling to convert your scheduled in-person clinic visit to a phone visit on 6/16/20 date.  The appointment will be on the same date and time.  What is the best number for the provider to call you at?    [phone]--No Video Equipment

## 2020-06-16 ENCOUNTER — AMBULATORY - HEALTHEAST (OUTPATIENT)
Dept: SCHEDULING | Facility: CLINIC | Age: 35
End: 2020-06-16

## 2020-06-16 ENCOUNTER — VIRTUAL VISIT (OUTPATIENT)
Dept: GASTROENTEROLOGY | Facility: CLINIC | Age: 35
End: 2020-06-16
Attending: INTERNAL MEDICINE
Payer: COMMERCIAL

## 2020-06-16 ENCOUNTER — AMBULATORY - HEALTHEAST (OUTPATIENT)
Dept: ULTRASOUND IMAGING | Facility: CLINIC | Age: 35
End: 2020-06-16

## 2020-06-16 DIAGNOSIS — K70.31 ALCOHOLIC CIRRHOSIS OF LIVER WITH ASCITES (H): ICD-10-CM

## 2020-06-16 DIAGNOSIS — K70.31 ALCOHOLIC CIRRHOSIS OF LIVER WITH ASCITES (H): Primary | ICD-10-CM

## 2020-06-16 DIAGNOSIS — Z11.59 ENCOUNTER FOR SCREENING FOR OTHER VIRAL DISEASES: ICD-10-CM

## 2020-06-16 ASSESSMENT — PAIN SCALES - GENERAL: PAINLEVEL: NO PAIN (0)

## 2020-06-16 NOTE — LETTER
"    6/16/2020         RE: Christo Herrera  [address] [place] [address]        Dear Colleague,    Thank you for referring your patient, Christo Herrera, to the [facility] HEPATOLOGY. Please see a copy of my visit note below.    Christo Herrera is a 35 year old male who is being evaluated via a billable telephone visit.      The patient has been notified of following:     \"This telephone visit will be conducted via a call between you and your physician/provider. We have found that certain health care needs can be provided without the need for a physical exam.  This service lets us provide the care you need with a short phone conversation.  If a prescription is necessary we can send it directly to your pharmacy.  If lab work is needed we can place an order for that and you can then stop by our lab to have the test done at a later time.    Telephone visits are billed at different rates depending on your insurance coverage. During this emergency period, for some insurers they may be billed the same as an in-person visit.  Please reach out to your insurance provider with any questions.    If during the course of the call the physician/provider feels a telephone visit is not appropriate, you will not be charged for this service.\"    Patient has given verbal consent for Telephone visit?  Yes    What phone number would you like to be contacted at? [phone]    How would you like to obtain your AVS? Mail a copy    Phone call duration: 15 minutes    Brandin Echavarria MD      [facility]    Hepatology follow-up    Referring provider:               Luda Caraballo     CHIEF COMPLAINT/REASON FOR THE VISIT:  Alcoholic liver disease.      SUBJECTIVE:  Mr. Christo Herrera is a 35-year-old male with a history significant for alcohol abuse from early age We saw him initially to be evaluated  for transplant.  Mr. Herrera started alcoholic beverages from an early age of " 18, and according to him, he was drinking more in the last 3-4 years as he had lost his job.      November of 2019,   he developed  alcoholic hepatitis, and he presented with jaundice and other signs of portal hypertension including fluid retention.  He  had  ascites  and has paracentesis at that time, although he did not have any spontaneous bacterial peritonitis.  He had admissions in December, 2019  for septic shock, anemia, hepatic encephalopathy and GI bleed.  He did have an EGD, which showed grade 1 esophageal varices and portal hypertensive gastropathy.  He had a colonoscopy which showed only a hemorrhoidal bleed.       Mr. Herrera today shows no signs of encephalopathy. He is lucid with no signs of confusion. He is still on lactulose and is having 4 to 5 bowel movements. .He is requiring 2 sessions of therapeutic paracentesis. He has also no significant abdominal pain and denies any nausea or vomiting.  He did lose a lot of muscle mass according to him and his dad also.  His last alcohol intake appears to be in 12/2019 but we have documentation that he might have had a positive test in January most likely. He has lower extremity discomfort.        Mr. Herrera also for the last 9 weeks stayed on dialysis and  stopped it at the end of April.  It was initially thought that his kidney disease was related with ATN with possible hepatorenal syndrome.  Patient denies fevers, sweats or chills.  No cough, SOB or chest pain.    Medical hx Surgical hx   Past Medical History:   Diagnosis Date     Alcoholic cirrhosis of liver with ascites (H) 3/10/2020     Ascites      ESRD (end stage renal disease) (H)      GERD (gastroesophageal reflux disease)      HE (hepatic encephalopathy) (H)      History of blood transfusion       Past Surgical History:   Procedure Laterality Date     COLONOSCOPY      [facility] 2020     GI SURGERY            Medications  Prior to Admission medications    Medication Sig Start Date End Date  Taking? Authorizing Provider   calcium carbonate-vitamin D (OSCAL W/D) 500-200 MG-UNIT tablet Take 1 tablet by mouth 2 times daily (with meals) 3/16/20  Yes Brandin Echavarria MD   cholecalciferol (VITAMIN D3) 1000 units (25 mcg) capsule Take 1 capsule (1,000 Units) by mouth daily 3/16/20  Yes Brandin Echavarria MD   guaiFENesin (MUCINEX) 600 MG 12 hr tablet Take 600 mg by mouth 2 times daily as needed for congestion or cough  2/15/20  Yes Reported, Patient   lactulose (CHRONULAC) 10 GM/15ML solution Take 10 g by mouth daily as needed  2/15/20  Yes Reported, Patient   Menthol-Methyl Salicylate (ICY HOT EXTRA STRENGTH) 10-30 % CREA    Yes Reported, Patient   metoprolol tartrate (LOPRESSOR) 25 MG tablet Take 6.25 mg by mouth 2 times daily  3/9/20  Yes Reported, Patient   Multiple Vitamins-Minerals (SUPER THERA PRERNA M) TABS Take 1 tablet by mouth 12/29/19  Yes Reported, Patient   Psyllium 58.12 % PACK Bid daily   Yes Reported, Patient   vitamin B1 (VITAMIN B-1) 100 MG tablet Take 100 mg by mouth 3/9/20  Yes Reported, Patient   ciprofloxacin (CIPRO) 250 MG tablet Take one 250 mg tab every 24 hours AFTER dialysis for 7 days.  Patient not taking: Reported on 6/16/2020 3/16/20   Brandin Echavarria MD   midodrine (PROAMATINE) 5 MG tablet Take 15 mg by mouth 2/15/20   Reported, Patient   Misc. Devices (ROLLATOR ULTRA-LIGHT) MISC Walker with front wheels for home use. 1/27/20   Reported, Patient       Allergies  No Known Allergies    Review of systems  A 10-point review of systems was negative    Examination  There were no vitals taken for this visit.  There is no height or weight on file to calculate BMI.    Gen- well, NAD, A+Ox3, normal color  Extr- hands normal, no LARISSA  Skin- no rash or jaundice  Neuro- no asterixis  Psych- normal mood    Laboratory  Lab Results   Component Value Date     03/10/2020    POTASSIUM 3.4 03/10/2020    CHLORIDE 102 03/10/2020    CO2 27 03/10/2020    BUN 19  03/10/2020    CR 4.09 03/10/2020       Lab Results   Component Value Date    BILITOTAL 2.0 03/10/2020    ALT 22 03/10/2020    AST 48 03/10/2020    ALKPHOS 237 03/10/2020       Lab Results   Component Value Date    ALBUMIN 2.2 03/10/2020    PROTTOTAL 6.0 03/10/2020        Lab Results   Component Value Date    WBC 8.4 03/10/2020    HGB 7.2 03/10/2020     03/10/2020     03/10/2020       Lab Results   Component Value Date    INR 1.48 03/10/2020     MELD-Na score: 27 at 3/10/2020  7:21 AM  MELD score: 27 at 3/10/2020  7:21 AM  Calculated from:  Serum Creatinine: 4.09 mg/dL (Rounded to 4 mg/dL) at 3/10/2020  7:21 AM  Serum Sodium: 136 mmol/L at 3/10/2020  7:21 AM  Total Bilirubin: 2.0 mg/dL at 3/10/2020  7:21 AM  INR(ratio): 1.48 at 3/10/2020  7:21 AM  Age: 34 years 9 months    Radiology    Assessment  Mr. Herrera has alcoholic liver disease, most likely acute on chronic liver disease.  He,has improved.   He has done a short period of around 18 days of alcohol treatment.  He has been, also, on dialysis now for 9 weeks and discontinued on may, 5.  He has only been alcohol free for possibly close to six months.  He might need, also, further treatment, and we will need, also, information regarding his previous treatment of chemical dependency.       Plan  -His chance of relapse is very high, so we await input from our  and her recommendations.    -He will continue doing paracentesis as needed with his hepatologist, and he will need, also, to continue doing surveillance for HCC or worsening of his portal hypertension through upper endoscopies.  Please note that the patient has grade 1 esophageal varices.    -I will see him here in 3 months  -Will repeat his labs.    Brandin Echavarria MD  Hepatology  [facility]      Again, thank you for allowing me to participate in the care of your patient.        Sincerely,        Brandin Echavarria MD

## 2020-06-16 NOTE — PROGRESS NOTES
"Christo Herrera is a 35 year old male who is being evaluated via a billable telephone visit.      The patient has been notified of following:     \"This telephone visit will be conducted via a call between you and your physician/provider. We have found that certain health care needs can be provided without the need for a physical exam.  This service lets us provide the care you need with a short phone conversation.  If a prescription is necessary we can send it directly to your pharmacy.  If lab work is needed we can place an order for that and you can then stop by our lab to have the test done at a later time.    Telephone visits are billed at different rates depending on your insurance coverage. During this emergency period, for some insurers they may be billed the same as an in-person visit.  Please reach out to your insurance provider with any questions.    If during the course of the call the physician/provider feels a telephone visit is not appropriate, you will not be charged for this service.\"    Patient has given verbal consent for Telephone visit?  Yes    What phone number would you like to be contacted at? [phone]    How would you like to obtain your AVS? Mail a copy    Phone call duration: 15 minutes    Brandin Echavarria MD      [facility]    Hepatology follow-up    Referring provider:               Luda Caraballo     CHIEF COMPLAINT/REASON FOR THE VISIT:  Alcoholic liver disease.      SUBJECTIVE:  Mr. Christo Herrera is a 35-year-old male with a history significant for alcohol abuse from early age We saw him initially to be evaluated  for transplant.  Mr. Herrera started alcoholic beverages from an early age of 18, and according to him, he was drinking more in the last 3-4 years as he had lost his job.      November of 2019,   he developed  alcoholic hepatitis, and he presented with jaundice and other signs of portal hypertension including fluid " retention.  He  had  ascites  and has paracentesis at that time, although he did not have any spontaneous bacterial peritonitis.  He had admissions in December, 2019  for septic shock, anemia, hepatic encephalopathy and GI bleed.  He did have an EGD, which showed grade 1 esophageal varices and portal hypertensive gastropathy.  He had a colonoscopy which showed only a hemorrhoidal bleed.       Mr. Herrera today shows no signs of encephalopathy. He is lucid with no signs of confusion. He is still on lactulose and is having 4 to 5 bowel movements. .He is requiring 2 sessions of therapeutic paracentesis. He has also no significant abdominal pain and denies any nausea or vomiting.  He did lose a lot of muscle mass according to him and his dad also.  His last alcohol intake appears to be in 12/2019 but we have documentation that he might have had a positive test in January most likely. He has lower extremity discomfort.        Mr. Herrera also for the last 9 weeks stayed on dialysis and  stopped it at the end of April.  It was initially thought that his kidney disease was related with ATN with possible hepatorenal syndrome.  Patient denies fevers, sweats or chills.  No cough, SOB or chest pain.    Medical hx Surgical hx   Past Medical History:   Diagnosis Date     Alcoholic cirrhosis of liver with ascites (H) 3/10/2020     Ascites      ESRD (end stage renal disease) (H)      GERD (gastroesophageal reflux disease)      HE (hepatic encephalopathy) (H)      History of blood transfusion       Past Surgical History:   Procedure Laterality Date     COLONOSCOPY      [facility] 2020     GI SURGERY            Medications  Prior to Admission medications    Medication Sig Start Date End Date Taking? Authorizing Provider   calcium carbonate-vitamin D (OSCAL W/D) 500-200 MG-UNIT tablet Take 1 tablet by mouth 2 times daily (with meals) 3/16/20  Yes Brandin Echavarria MD   cholecalciferol (VITAMIN D3) 1000 units (25 mcg)  capsule Take 1 capsule (1,000 Units) by mouth daily 3/16/20  Yes Brandin Echavarria MD   guaiFENesin (MUCINEX) 600 MG 12 hr tablet Take 600 mg by mouth 2 times daily as needed for congestion or cough  2/15/20  Yes Reported, Patient   lactulose (CHRONULAC) 10 GM/15ML solution Take 10 g by mouth daily as needed  2/15/20  Yes Reported, Patient   Menthol-Methyl Salicylate (ICY HOT EXTRA STRENGTH) 10-30 % CREA    Yes Reported, Patient   metoprolol tartrate (LOPRESSOR) 25 MG tablet Take 6.25 mg by mouth 2 times daily  3/9/20  Yes Reported, Patient   Multiple Vitamins-Minerals (SUPER THERA PRERNA M) TABS Take 1 tablet by mouth 12/29/19  Yes Reported, Patient   Psyllium 58.12 % PACK Bid daily   Yes Reported, Patient   vitamin B1 (VITAMIN B-1) 100 MG tablet Take 100 mg by mouth 3/9/20  Yes Reported, Patient   ciprofloxacin (CIPRO) 250 MG tablet Take one 250 mg tab every 24 hours AFTER dialysis for 7 days.  Patient not taking: Reported on 6/16/2020 3/16/20   Brandin Echavarria MD   midodrine (PROAMATINE) 5 MG tablet Take 15 mg by mouth 2/15/20   Reported, Patient   Misc. Devices (ROLLATOR ULTRA-LIGHT) MISC Walker with front wheels for home use. 1/27/20   Reported, Patient       Allergies  No Known Allergies    Review of systems  A 10-point review of systems was negative    Examination  There were no vitals taken for this visit.  There is no height or weight on file to calculate BMI.    Gen- well, NAD, A+Ox3, normal color  Extr- hands normal, no LARISSA  Skin- no rash or jaundice  Neuro- no asterixis  Psych- normal mood    Laboratory  Lab Results   Component Value Date     03/10/2020    POTASSIUM 3.4 03/10/2020    CHLORIDE 102 03/10/2020    CO2 27 03/10/2020    BUN 19 03/10/2020    CR 4.09 03/10/2020       Lab Results   Component Value Date    BILITOTAL 2.0 03/10/2020    ALT 22 03/10/2020    AST 48 03/10/2020    ALKPHOS 237 03/10/2020       Lab Results   Component Value Date    ALBUMIN 2.2 03/10/2020     PROTTOTAL 6.0 03/10/2020        Lab Results   Component Value Date    WBC 8.4 03/10/2020    HGB 7.2 03/10/2020     03/10/2020     03/10/2020       Lab Results   Component Value Date    INR 1.48 03/10/2020     MELD-Na score: 27 at 3/10/2020  7:21 AM  MELD score: 27 at 3/10/2020  7:21 AM  Calculated from:  Serum Creatinine: 4.09 mg/dL (Rounded to 4 mg/dL) at 3/10/2020  7:21 AM  Serum Sodium: 136 mmol/L at 3/10/2020  7:21 AM  Total Bilirubin: 2.0 mg/dL at 3/10/2020  7:21 AM  INR(ratio): 1.48 at 3/10/2020  7:21 AM  Age: 34 years 9 months    Radiology    Assessment  Mr. Herrera has alcoholic liver disease, most likely acute on chronic liver disease.  He,has improved.   He has done a short period of around 18 days of alcohol treatment.  He has been, also, on dialysis now for 9 weeks and discontinued on may, 5.  He has only been alcohol free for possibly close to six months.  He might need, also, further treatment, and we will need, also, information regarding his previous treatment of chemical dependency.       Plan  -His chance of relapse is very high, so we await input from our  and her recommendations.    -He will continue doing paracentesis as needed with his hepatologist, and he will need, also, to continue doing surveillance for HCC or worsening of his portal hypertension through upper endoscopies.  Please note that the patient has grade 1 esophageal varices.    -I will see him here in 3 months  -Will repeat his labs.    Brandin Echavarria MD  Hepatology  [facility]

## 2020-06-19 ENCOUNTER — HOSPITAL ENCOUNTER (OUTPATIENT)
Dept: ULTRASOUND IMAGING | Facility: CLINIC | Age: 35
Discharge: HOME | End: 2020-06-19
Attending: INTERNAL MEDICINE | Admitting: RADIOLOGY

## 2020-06-19 DIAGNOSIS — K70.31 ALCOHOLIC CIRRHOSIS OF LIVER WITH ASCITES (H): ICD-10-CM

## 2020-06-21 ENCOUNTER — TRANSFERRED RECORDS (OUTPATIENT)
Dept: HEALTH INFORMATION MANAGEMENT | Facility: CLINIC | Age: 35
End: 2020-06-21

## 2020-06-24 ENCOUNTER — TELEPHONE (OUTPATIENT)
Dept: TRANSPLANT | Facility: CLINIC | Age: 35
End: 2020-06-24

## 2020-06-24 NOTE — TELEPHONE ENCOUNTER
Transplant Social Work Services Phone Call      Data: Christo is being evaluated for liver transplantation.  Intervention: I attempted to reach Christo but was unable.  His father answered and said Christo is hospitalized at [facility] in ICU.  Supportive counseling provided.  Assessment: Christo' father reports Christo is not doing well, and is back on dialysis.  He is concerned about Christo' prognosis.    Education provided by SW: deferred  Plan: To update Dr. Echavarria and Nelson Alcala, Liver Transplant Coordinator.      KAYA Lacey, [facility]  Liver Transplant   Phone [phone]  Pager [phone]

## 2020-06-30 ENCOUNTER — TELEPHONE (OUTPATIENT)
Dept: TRANSPLANT | Facility: CLINIC | Age: 35
End: 2020-06-30

## 2020-06-30 NOTE — TELEPHONE ENCOUNTER
Pt's dad calling  He is currently inpt at [facility]  Call pt back  Needs to review with Coordiator and Coordinator at [facility] might want to talk with him   Then call Dad back so he knows what is goig on

## 2020-06-30 NOTE — Clinical Note
J - please set up with the following to complete his eval. Needs DSE asap & cards virtual consult right after. Please set up with txp nephrology this or next week as well. Has labs & an US that Jericho just put in that should be done next week too. Thank you!!!!  -tk

## 2020-06-30 NOTE — LETTER
LIVER TRANSPLANT  EVALUATION SCHEDULE    Patient:   Christo Herrera  MR#:    [id number]  Coordinator:  Nelson       [phone]  :     Sherry               [phone]  [facility]  Date(s):    July 13, 2020 & July 15, 2020      July 20, 2020      Day/Date:    Monday, July 13, 2020  Time Location Activity   11:15 AM-12:00 PM Video Visit: Please review Video Visit Instructions that were sent via [employer] Message Appointment with Dr. Mccormick,  Nephrologist           Day/Date:    Wednesday, July 15, 2020  Time Location Activity   12:45 PM [facility] Waiting Room  ([address]nd [facility]  [address] [place] [address]) Lab Draw   1:00 PM [facility] Waiting Room  ([address] [place] [address]) Liver Ultrasound with dopplers=NO FOOD or DRINK8 HOURS BEFORE TEST!!!   2:00 PM [facility] Waiting Room  ([address] [place] [address]) Dobutamine Stress Echo = SEE ENCLOSED INSTRUCTIONS for TEST!             Day/Date:    Monday, July 20, 2020  Time Location Activity   12:30 PM-  1:00 PM Video Visit: Please review Video Visit Instructions that were sent via [employer] Message Appointment with Dr. Velasquez,  Cardiology

## 2020-07-01 NOTE — TELEPHONE ENCOUNTER
Left message letting patients father know that Dr. Echavarria, Coordinator Nelson and , Oneal, are aware of his inpatient status at outside hospital.     Plan was for patient to have 6 mo sobriety, which per medical records last positive ETOH was on 1/13/20, 6 mo sobriety 7/13/20  (patient has poor recall of quit date). Patient should also be involved in relapse prevention.     Patient should follow up with coordinator and  after discharge.

## 2020-07-02 ENCOUNTER — HOSPITAL ENCOUNTER (OUTPATIENT)
Dept: ULTRASOUND IMAGING | Facility: CLINIC | Age: 35
Discharge: HOME | End: 2020-07-02
Attending: INTERNAL MEDICINE

## 2020-07-05 DIAGNOSIS — E55.9 VITAMIN D DEFICIENCY: ICD-10-CM

## 2020-07-05 DIAGNOSIS — K70.31 ALCOHOLIC CIRRHOSIS OF LIVER WITH ASCITES (H): ICD-10-CM

## 2020-07-06 ENCOUNTER — TELEPHONE (OUTPATIENT)
Dept: TRANSPLANT | Facility: CLINIC | Age: 35
End: 2020-07-06

## 2020-07-06 DIAGNOSIS — K70.31 ALCOHOLIC CIRRHOSIS OF LIVER WITH ASCITES (H): Primary | ICD-10-CM

## 2020-07-06 RX ORDER — CHOLECALCIFEROL (VITAMIN D3) 25 MCG
CAPSULE ORAL
Qty: 30 CAPSULE | Refills: 3 | Status: SHIPPED | OUTPATIENT
Start: 2020-07-06 | End: 2021-08-11

## 2020-07-06 NOTE — TELEPHONE ENCOUNTER
Spoke to patients father.    Patient was discharged last night, doing well, ate breakfast this AM and took meds.    He is starting outpatient dialysis tomorrow as well.     Will review chart and confirm with SW if now is time to put him back into evaluation.

## 2020-07-06 NOTE — TELEPHONE ENCOUNTER
"Transplant Social Work Services Phone Call      Data:  I received a call from patient's father Christo Villafuerte Sr. Reports his son is doing better and was discharged from the hospital yesterday.    Intervention/education: Patient's father and I reviewed the followin. Chemical health recommendations: Christo has started weekly telephone coaching through [employer] as recommended.  I strongly encouraged Christo Grant to continue reinforcing the importance of relapse prevention lifelong.    2. Adjustment to illness/coping: Christo Grant does feel his son was more \"depressed\" while hospitalized.  I suggested patient begin individual counseling with Dr. Renae, health psychologist who works with liver transplant patients here at [facility].  Christo Grant agrees this may be helpful, and a referral was placed today.  I also encouraged Christo Grant to consider trying virtual liver transplant support group and I will mail instructions to patient/father.  Assessment: Patient will achieve six months of sobriety next week which meets our sobriety criteria.  He completed an inpatient treatment program back in February of this year, and he is now engaged in relapse prevention.     Christo has strong care giving support from his father Christo Grant who he resides with.  His father helps with managing his medications and coordinating medical care.  Plan: Christo will begin individual counseling with Dr. Renae.  He should continue with weekly telephone coaching through [facility].     I will remain available throughout patient's liver transplant evaluation.    KAYA Lacey, [facility]  Liver Transplant   Phone [phone]  Pager [phone]   "

## 2020-07-08 DIAGNOSIS — E55.9 VITAMIN D DEFICIENCY: ICD-10-CM

## 2020-07-08 DIAGNOSIS — K70.31 ALCOHOLIC CIRRHOSIS OF LIVER WITH ASCITES (H): ICD-10-CM

## 2020-07-08 RX ORDER — CALCIUM CARBONATE/VITAMIN D3 500MG-5MCG
TABLET ORAL
Qty: 60 TABLET | Refills: 3 | Status: SHIPPED | OUTPATIENT
Start: 2020-07-08 | End: 2021-08-11

## 2020-07-09 NOTE — TELEPHONE ENCOUNTER
Left message on both home and cell phone for patient to schedule Liver Transplant Evaluation appointments.  Asked patient to call back to schedule.

## 2020-07-10 NOTE — TELEPHONE ENCOUNTER
Spoke with the patient and confirmed Evals: Pre-Liver Eval appointments on 7/13/20, 7/15/20 and 7/20/20.  Informed patient an itinerary can be accessed via [employer], and will be sent via email.

## 2020-07-13 NOTE — TELEPHONE ENCOUNTER
RECORDS RECEIVED FROM: Internal/Care Everywhere   DATE RECEIVED:    NOTES STATUS DETAILS   OFFICE NOTE from referring provider    Internal SOT   OFFICE NOTE from other cardiologist    N/A    DISCHARGE SUMMARY from hospital    Care Everywhere 1-10-20 [facility]   DISCHARGE REPORT from the ER   N/A    OPERATIVE REPORT    N/A    MEDICATION LIST   Internal    LABS     BMP   Care Everywhere 7-1-20   CBC   Care Everywhere 7-5-20   CMP   Care Everywhere 7-5-20   Lipids   Internal 3-10-20   TSH   Internal 3-10-20   DIAGNOSTIC PROCEDURES     EKG   In process    Monitor Reports   N/A    IMAGING (DISC & REPORT)      Echo   In process Scheduled for 7-15  Requested from [facility]   Stress Tests   N/A    Cath   N/A    MRI/MRA   N/A    CT/CTA   N/A      Action    Action Taken 7-13: Requested from [facility]:    Echo    7-10-20, 7-3-20, 7-1-20, 6-30-20 7-13: requested 4 most recent EKG strips from [facility]  7-14: resolved all echos in PACS

## 2020-07-15 ENCOUNTER — HOSPITAL ENCOUNTER (OUTPATIENT)
Dept: ULTRASOUND IMAGING | Facility: CLINIC | Age: 35
End: 2020-07-15
Attending: INTERNAL MEDICINE
Payer: COMMERCIAL

## 2020-07-15 ENCOUNTER — HOSPITAL ENCOUNTER (OUTPATIENT)
Dept: CARDIOLOGY | Facility: CLINIC | Age: 35
End: 2020-07-15
Attending: INTERNAL MEDICINE
Payer: COMMERCIAL

## 2020-07-15 DIAGNOSIS — K76.6 PORTAL HYPERTENSION (H): ICD-10-CM

## 2020-07-15 DIAGNOSIS — K70.31 ALCOHOLIC CIRRHOSIS OF LIVER WITH ASCITES (H): ICD-10-CM

## 2020-07-15 LAB
ALBUMIN SERPL-MCNC: 3 G/DL (ref 3.4–5)
ALP SERPL-CCNC: 170 U/L (ref 40–150)
ALT SERPL W P-5'-P-CCNC: 22 U/L (ref 0–70)
ANION GAP SERPL CALCULATED.3IONS-SCNC: 8 MMOL/L (ref 3–14)
AST SERPL W P-5'-P-CCNC: 47 U/L (ref 0–45)
BILIRUB DIRECT SERPL-MCNC: 1.6 MG/DL (ref 0–0.2)
BILIRUB SERPL-MCNC: 3.4 MG/DL (ref 0.2–1.3)
BUN SERPL-MCNC: 32 MG/DL (ref 7–30)
CALCIUM SERPL-MCNC: 8.8 MG/DL (ref 8.5–10.1)
CHLORIDE SERPL-SCNC: 99 MMOL/L (ref 94–109)
CO2 SERPL-SCNC: 30 MMOL/L (ref 20–32)
CREAT SERPL-MCNC: 2.15 MG/DL (ref 0.66–1.25)
ERYTHROCYTE [DISTWIDTH] IN BLOOD BY AUTOMATED COUNT: 19 % (ref 10–15)
GFR SERPL CREATININE-BSD FRML MDRD: 39 ML/MIN/{1.73_M2}
GLUCOSE SERPL-MCNC: 91 MG/DL (ref 70–99)
HCT VFR BLD AUTO: 30.6 % (ref 40–53)
HGB BLD-MCNC: 9.8 G/DL (ref 13.3–17.7)
INR PPP: 1.37 (ref 0.86–1.14)
MCH RBC QN AUTO: 31.1 PG (ref 26.5–33)
MCHC RBC AUTO-ENTMCNC: 32 G/DL (ref 31.5–36.5)
MCV RBC AUTO: 97 FL (ref 78–100)
PLATELET # BLD AUTO: 204 10E9/L (ref 150–450)
POTASSIUM SERPL-SCNC: 3.7 MMOL/L (ref 3.4–5.3)
PROT SERPL-MCNC: 6.8 G/DL (ref 6.8–8.8)
RBC # BLD AUTO: 3.15 10E12/L (ref 4.4–5.9)
SODIUM SERPL-SCNC: 137 MMOL/L (ref 133–144)
WBC # BLD AUTO: 6.1 10E9/L (ref 4–11)

## 2020-07-15 PROCEDURE — 93350 STRESS TTE ONLY: CPT | Mod: 26 | Performed by: INTERNAL MEDICINE

## 2020-07-15 PROCEDURE — 25000125 ZZHC RX 250: Performed by: INTERNAL MEDICINE

## 2020-07-15 PROCEDURE — 93016 CV STRESS TEST SUPVJ ONLY: CPT | Performed by: INTERNAL MEDICINE

## 2020-07-15 PROCEDURE — 93018 CV STRESS TEST I&R ONLY: CPT | Performed by: INTERNAL MEDICINE

## 2020-07-15 PROCEDURE — 93321 DOPPLER ECHO F-UP/LMTD STD: CPT | Mod: TC

## 2020-07-15 PROCEDURE — 25000128 H RX IP 250 OP 636: Performed by: INTERNAL MEDICINE

## 2020-07-15 PROCEDURE — 25500064 ZZH RX 255 OP 636: Performed by: INTERNAL MEDICINE

## 2020-07-15 PROCEDURE — 93321 DOPPLER ECHO F-UP/LMTD STD: CPT | Mod: 26 | Performed by: INTERNAL MEDICINE

## 2020-07-15 PROCEDURE — 76700 US EXAM ABDOM COMPLETE: CPT

## 2020-07-15 PROCEDURE — 93325 DOPPLER ECHO COLOR FLOW MAPG: CPT | Mod: 26 | Performed by: INTERNAL MEDICINE

## 2020-07-15 RX ORDER — METOPROLOL TARTRATE 1 MG/ML
1-20 INJECTION, SOLUTION INTRAVENOUS
Status: ACTIVE | OUTPATIENT
Start: 2020-07-15 | End: 2020-07-15

## 2020-07-15 RX ORDER — DOBUTAMINE HYDROCHLORIDE 200 MG/100ML
10-50 INJECTION INTRAVENOUS CONTINUOUS
Status: SHIPPED | OUTPATIENT
Start: 2020-07-15 | End: 2020-07-15

## 2020-07-15 RX ORDER — SODIUM CHLORIDE 9 MG/ML
INJECTION, SOLUTION INTRAVENOUS CONTINUOUS
Status: ACTIVE | OUTPATIENT
Start: 2020-07-15 | End: 2020-07-15

## 2020-07-15 RX ORDER — ATROPINE SULFATE 0.4 MG/ML
.2-2 AMPUL (ML) INJECTION
Status: COMPLETED | OUTPATIENT
Start: 2020-07-15 | End: 2020-07-15

## 2020-07-15 RX ADMIN — Medication 0.6 MG: at 14:28

## 2020-07-15 RX ADMIN — METOPROLOL TARTRATE 1 MG: 5 INJECTION INTRAVENOUS at 14:31

## 2020-07-15 RX ADMIN — HUMAN ALBUMIN MICROSPHERES AND PERFLUTREN 9 ML: 10; .22 INJECTION, SOLUTION INTRAVENOUS at 14:40

## 2020-07-15 RX ADMIN — DOBUTAMINE HYDROCHLORIDE 10 MCG/KG/MIN: 200 INJECTION INTRAVENOUS at 14:19

## 2020-07-17 LAB — ETHYL GLUCURONIDE UR QL: NEGATIVE

## 2020-07-20 ENCOUNTER — VIRTUAL VISIT (OUTPATIENT)
Dept: CARDIOLOGY | Facility: CLINIC | Age: 35
End: 2020-07-20
Attending: INTERNAL MEDICINE
Payer: COMMERCIAL

## 2020-07-20 ENCOUNTER — PRE VISIT (OUTPATIENT)
Dept: CARDIOLOGY | Facility: CLINIC | Age: 35
End: 2020-07-20

## 2020-07-20 ENCOUNTER — VIRTUAL VISIT (OUTPATIENT)
Dept: BEHAVIORAL HEALTH | Facility: CLINIC | Age: 35
End: 2020-07-20
Payer: COMMERCIAL

## 2020-07-20 DIAGNOSIS — Z99.2 CHRONIC KIDNEY DISEASE ON CHRONIC DIALYSIS (H): ICD-10-CM

## 2020-07-20 DIAGNOSIS — F10.21 ALCOHOL USE DISORDER, SEVERE, IN EARLY REMISSION (H): ICD-10-CM

## 2020-07-20 DIAGNOSIS — Z01.810 ENCOUNTER FOR PRE-OPERATIVE CARDIOVASCULAR CLEARANCE: Primary | ICD-10-CM

## 2020-07-20 DIAGNOSIS — K70.31 ALCOHOLIC CIRRHOSIS OF LIVER WITH ASCITES (H): ICD-10-CM

## 2020-07-20 DIAGNOSIS — F43.20 ADJUSTMENT DISORDER, UNSPECIFIED TYPE: Primary | ICD-10-CM

## 2020-07-20 DIAGNOSIS — Z76.82 LIVER TRANSPLANT CANDIDATE: ICD-10-CM

## 2020-07-20 DIAGNOSIS — N18.6 CHRONIC KIDNEY DISEASE ON CHRONIC DIALYSIS (H): ICD-10-CM

## 2020-07-20 DIAGNOSIS — N18.4 CHRONIC KIDNEY DISEASE, STAGE IV (SEVERE) (H): ICD-10-CM

## 2020-07-20 PROCEDURE — 99203 OFFICE O/P NEW LOW 30 MIN: CPT | Mod: 95 | Performed by: INTERNAL MEDICINE

## 2020-07-20 RX ORDER — TORSEMIDE 100 MG/1
100 TABLET ORAL DAILY PRN
COMMUNITY
End: 2021-08-11

## 2020-07-20 RX ORDER — FERROUS SULFATE 325(65) MG
TABLET ORAL
COMMUNITY
Start: 2020-07-05 | End: 2021-08-11

## 2020-07-20 SDOH — ECONOMIC STABILITY: INCOME INSECURITY: IN THE LAST 12 MONTHS, WAS THERE A TIME WHEN YOU WERE NOT ABLE TO PAY THE MORTGAGE OR RENT ON TIME?: NO

## 2020-07-20 ASSESSMENT — ANXIETY QUESTIONNAIRES
7. FEELING AFRAID AS IF SOMETHING AWFUL MIGHT HAPPEN: NOT AT ALL
6. BECOMING EASILY ANNOYED OR IRRITABLE: NOT AT ALL
3. WORRYING TOO MUCH ABOUT DIFFERENT THINGS: SEVERAL DAYS
2. NOT BEING ABLE TO STOP OR CONTROL WORRYING: NOT AT ALL
GAD7 TOTAL SCORE: 1
5. BEING SO RESTLESS THAT IT IS HARD TO SIT STILL: NOT AT ALL
1. FEELING NERVOUS, ANXIOUS, OR ON EDGE: NOT AT ALL
GAD7 TOTAL SCORE: 1
GAD7 TOTAL SCORE: 1
7. FEELING AFRAID AS IF SOMETHING AWFUL MIGHT HAPPEN: NOT AT ALL
4. TROUBLE RELAXING: NOT AT ALL

## 2020-07-20 ASSESSMENT — PATIENT HEALTH QUESTIONNAIRE - PHQ9
SUM OF ALL RESPONSES TO PHQ QUESTIONS 1-9: 2
10. IF YOU CHECKED OFF ANY PROBLEMS, HOW DIFFICULT HAVE THESE PROBLEMS MADE IT FOR YOU TO DO YOUR WORK, TAKE CARE OF THINGS AT HOME, OR GET ALONG WITH OTHER PEOPLE: NOT DIFFICULT AT ALL
SUM OF ALL RESPONSES TO PHQ QUESTIONS 1-9: 2

## 2020-07-20 NOTE — PROGRESS NOTES
"Christo Herrera is a 35 year old male who is being evaluated via a billable video visit.      The patient has been notified of following:     \"This video visit will be conducted via a call between you and your physician/provider. We have found that certain health care needs can be provided without the need for an in-person physical exam.  This service lets us provide the care you need with a video conversation.  If a prescription is necessary we can send it directly to your pharmacy.  If lab work is needed we can place an order for that and you can then stop by our lab to have the test done at a later time.    Video visits are billed at different rates depending on your insurance coverage.  Please reach out to your insurance provider with any questions.    If during the course of the call the physician/provider feels a video visit is not appropriate, you will not be charged for this service.\"    Patient has given verbal consent for Video visit? Yes  How would you like to obtain your AVS? MyChart  If you are dropped from the video visit, the video invite should be resent to:   Will anyone else be joining your video visit? No        Video-Visit Details    Type of service:  Video Visit    Video Start Time: 12:30 pm  Video End Time: 12:50    Originating Location (pt. Location): Home    Distant Location (provider location):  [facility]     Platform used for Video Visit: EmSense    Referring provider: Bobby Snyder    HPI: Mr. Christo Herrera is a 35 year old  male with PMH significant for CKD on dialysis (started a month ago), anemia of chronic disease and alcoholic cirrhosis currently being evaluated for liver transplant.      Liver cirrhosis has been complicated with septic shock, hepatic encephalopathy and GI bleeding in December 2019.  He had EGD which showed grade 1 esophageal varices.  Patient requires therapeutic paracentesis.  Patient is currently sober.    Patient was admitted to [facility] 6/21/2020 due to abnormal labs with creatinine of 9.  He was noted to have large pericardial effusion.  He underwent pericardiocentesis with 600 cc of frankly bloody fluid which was attributed to uremia.  Patient's hospitalization were complicated by drowsiness leading to intubation.  He was started on Keppra for presumed seizure disorder.    Patient has no prior history of CAD.  Denies exertional chest pain.  Reports dyspnea with strenuous activity.  Denies palpitations, dizziness, PND, orthopnea or syncope.  Reports mild lower extremity edema.  He lives with his father and reports being able to do ADLs.  He tells me that he can climb 1-2 flights of stairs.  He is a lifetime non-smoker.  No history of hypertension, diabetes, or family history of premature CAD.  He has mild hyperlipidemia.  Patient is currently on lactulose, midodrine 15 mg 3 times daily and torsemide 100 mg daily.  Patient had dobutamine stress echocardiogram on 7/15/2020.  I have personally reviewed the images.  Baseline echocardiogram showed normal biventricular function with no evidence of significant valve disease.  PA pressure could not be measured.  Based on other parameters (mainly the PA acceleration time) he likely has no pulmonary hypertension.  Available EKG in [facility] from 6/21/2020 shows sinus rhythm otherwise unremarkable.    Patient's labs on 7/15/2020 showed anemia at 9.8, INR of 1.37, creatinine 2.15 and LDL of 153.    Medications, personal, family, and social history reviewed with patient and revised.    PAST MEDICAL HISTORY:  Past Medical History:   Diagnosis Date     Alcoholic cirrhosis of liver with ascites (H) 3/10/2020     Ascites      ESRD (end stage renal disease) (H)      GERD (gastroesophageal reflux disease)      HE (hepatic encephalopathy) (H)      History of blood transfusion        CURRENT MEDICATIONS:  Current Outpatient Medications   Medication Sig Dispense Refill     Cholecalciferol (VITAMIN D HIGH POTENCY)  25 MCG (1000 UT) CAPS TAKE ONE CAPSULE BY MOUTH DAILY 30 capsule 3     ciprofloxacin (CIPRO) 250 MG tablet Take one 250 mg tab every 24 hours AFTER dialysis for 7 days. (Patient not taking: Reported on 6/16/2020) 7 tablet 0     guaiFENesin (MUCINEX) 600 MG 12 hr tablet Take 600 mg by mouth 2 times daily as needed for congestion or cough        lactulose (CHRONULAC) 10 GM/15ML solution Take 10 g by mouth daily as needed        Menthol-Methyl Salicylate (ICY HOT EXTRA STRENGTH) 10-30 % CREA        metoprolol tartrate (LOPRESSOR) 25 MG tablet Take 6.25 mg by mouth 2 times daily        midodrine (PROAMATINE) 5 MG tablet Take 15 mg by mouth       Misc. Devices (ROLLATOR ULTRA-LIGHT) MISC Walker with front wheels for home use.       Multiple Vitamins-Minerals (SUPER THERA PRERNA M) TABS Take 1 tablet by mouth       OYSCO 500 + D 500-200 MG-UNIT tablet TAKE 1 TABLET BY MOUTH TWICE DAILY WITH MEALS 60 tablet 3     Psyllium 58.12 % PACK Bid daily       vitamin B1 (VITAMIN B-1) 100 MG tablet Take 100 mg by mouth         PAST SURGICAL HISTORY:  Past Surgical History:   Procedure Laterality Date     COLONOSCOPY      [facility] 2020     GI SURGERY         ALLERGIES:   No Known Allergies    FAMILY HISTORY:  Family History   Problem Relation Age of Onset     Coronary Artery Disease Father      Hypertension Maternal Grandfather          SOCIAL HISTORY:  Social History     Tobacco Use     Smoking status: Never Smoker     Smokeless tobacco: Never Used   Substance Use Topics     Alcohol use: Not Currently     Frequency: Never     Drinks per session: Patient refused     Binge frequency: Never     Comment: Quit 11/2019     Drug use: Not Currently       ROS:   Constitutional: No fever, chills, or sweats. Weight stable.   ENT: No visual disturbance, ear ache, epistaxis, sore throat.   Cardiovascular: As per HPI.   Respiratory: No cough, hemoptysis.    GI: No nausea, vomiting, hematemesis, melena, or hematochezia.   : No hematuria.    Integument: Negative.   Psychiatric: Negative.   Hematologic:  No easy bruising, no easy bleeding.  Neuro: Negative.   Endocrinology: No significant heat or cold intolerance   Musculoskeletal: No myalgia.    Exam:  Physical Exam Elements attainable via telehealth:       Constitutional - alert and no distress    Eyes - no redness, no discharge    Respiratory - no cough, no labored breathing    Skin - no discoloration or lesions on the face or the arm.    Neurological -alert, normal speech,and affect, no tremor.     I have reviewed the labs and personally reviewed the imaging below and made my comment in the assessment and plan.    Labs:  CBC RESULTS:   Lab Results   Component Value Date    WBC 6.1 07/15/2020    RBC 3.15 (L) 07/15/2020    HGB 9.8 (L) 07/15/2020    HCT 30.6 (L) 07/15/2020    MCV 97 07/15/2020    MCH 31.1 07/15/2020    MCHC 32.0 07/15/2020    RDW 19.0 (H) 07/15/2020     07/15/2020       BMP RESULTS:  Lab Results   Component Value Date     07/15/2020    POTASSIUM 3.7 07/15/2020    CHLORIDE 99 07/15/2020    CO2 30 07/15/2020    ANIONGAP 8 07/15/2020    GLC 91 07/15/2020    BUN 32 (H) 07/15/2020    CR 2.15 (H) 07/15/2020    GFRESTIMATED 39 (L) 07/15/2020    GFRESTBLACK 45 (L) 07/15/2020    JILLIAN 8.8 07/15/2020        INR RESULTS:  Lab Results   Component Value Date    INR 1.37 (H) 07/15/2020    INR 1.48 (H) 03/10/2020         Dobutamine stress echocardiogram 7/15/2020  Normal, low-risk dobutamine echocardiogram without evidence of ischemia at a  diagnostic workload (87% MPHR.)  The target heart rate was achieved.  Patient reported jaw pain during dobutamine infusion which resolved in the  recovery phase.  Normal biventricular size, thickness, and global systolic function at  baseline, LVEF=60-65%.  With low-dose dobutamine, LVEF augmented and LV cavity size decreased  appropriately.  With peak dobutamine, LVEF increased further to >70% and LV cavity size  decreased appropriately.  No regional  wall motion abnormalities at rest or with dobutamine.  No ECG evidence of ischemia. Normal heart rate and blood pressure response to  dobutamine.  No significant valvular abnormalities are noted on screening Doppler exam.  The aortic root and visualized ascending aorta are normal.  PASP cannot be estimated, however PA pressure is probably normal based on  normal PA acceleration time (180 msec.)      EKG 6/21/2020:        Assessment and Plan:   Mr. Christo Herrera is a 35 year old  male with PMH significant for CKD on dialysis (started a month ago) anemia of chronic disease and alcoholic cirrhosis currently being evaluated for liver transplant.      1.  Cardiovascular evaluation prior to liver transplant: Patient reports no concerning cardiac symptoms at this time.  Reports fairly good functional capacity.  Dobutamine stress echocardiogram 7/15/2020 which I have personally reviewed shows normal biventricular function with no valvular disease.  No concerning findings for pulmonary hypertension.  Patient underwent pericardiocentesis a month ago in the setting of worsening kidney function.  Since then he is on dialysis.  No recurrent pericardial effusion on the most recent echocardiogram.  Patient can proceed to planned liver transplant without any further cardiac testing.    2.  CKD on dialysis: Continues to be on torsemide 100 mg daily.  Takes midodrine 15 mg 3 times daily for blood pressure support.    No medication changes today.  Return to clinic as needed.    A total of 20 minutes spent face-toface through video encounter today with greater than 50% of the time spent in counseling and coordinating cares of the issues above.     Please donot hesitate to contact me if you have any questions or concerns. Again, thank you for allowing me to participate in the care of your patient.    Jada ENGLAND MD  [facility] Division of Cardiology  Pager [phone]

## 2020-07-20 NOTE — LETTER
7/20/2020      RE: Christo Herrera  [address] [place] [address]       Dear Colleague,    Thank you for the opportunity to participate in the care of your patient, Christo Herrera, at the [facility]. Please see a copy of my visit note below.    Christo Herrera is a 35 year old male who is being evaluated via a billable video visit.        Referring provider: Bobby Snyder    HPI: Mr. Christo Herrera is a 35 year old  male with PMH significant for CKD on dialysis (started a month ago), anemia of chronic disease and alcoholic cirrhosis currently being evaluated for liver transplant.      Liver cirrhosis has been complicated with septic shock, hepatic encephalopathy and GI bleeding in December 2019.  He had EGD which showed grade 1 esophageal varices.  Patient requires therapeutic paracentesis.  Patient is currently sober.    Patient was admitted to [facility] 6/21/2020 due to abnormal labs with creatinine of 9.  He was noted to have large pericardial effusion.  He underwent pericardiocentesis with 600 cc of frankly bloody fluid which was attributed to uremia.  Patient's hospitalization were complicated by drowsiness leading to intubation.  He was started on Keppra for presumed seizure disorder.    Patient has no prior history of CAD.  Denies exertional chest pain.  Reports dyspnea with strenuous activity.  Denies palpitations, dizziness, PND, orthopnea or syncope.  Reports mild lower extremity edema.  He lives with his father and reports being able to do ADLs.  He tells me that he can climb 1-2 flights of stairs.  He is a lifetime non-smoker.  No history of hypertension, diabetes, or family history of premature CAD.  He has mild hyperlipidemia.  Patient is currently on lactulose, midodrine 15 mg 3 times daily and torsemide 100 mg daily.  Patient had dobutamine stress echocardiogram on 7/15/2020.  I have personally  reviewed the images.  Baseline echocardiogram showed normal biventricular function with no evidence of significant valve disease.  PA pressure could not be measured.  Based on other parameters (mainly the PA acceleration time) he likely has no pulmonary hypertension.  Available EKG in [facility] from 6/21/2020 shows sinus rhythm otherwise unremarkable.    Patient's labs on 7/15/2020 showed anemia at 9.8, INR of 1.37, creatinine 2.15 and LDL of 153.    Medications, personal, family, and social history reviewed with patient and revised.    PAST MEDICAL HISTORY:  Past Medical History:   Diagnosis Date     Alcoholic cirrhosis of liver with ascites (H) 3/10/2020     Ascites      ESRD (end stage renal disease) (H)      GERD (gastroesophageal reflux disease)      HE (hepatic encephalopathy) (H)      History of blood transfusion        CURRENT MEDICATIONS:  Current Outpatient Medications   Medication Sig Dispense Refill     Cholecalciferol (VITAMIN D HIGH POTENCY) 25 MCG (1000 UT) CAPS TAKE ONE CAPSULE BY MOUTH DAILY 30 capsule 3     ciprofloxacin (CIPRO) 250 MG tablet Take one 250 mg tab every 24 hours AFTER dialysis for 7 days. (Patient not taking: Reported on 6/16/2020) 7 tablet 0     guaiFENesin (MUCINEX) 600 MG 12 hr tablet Take 600 mg by mouth 2 times daily as needed for congestion or cough        lactulose (CHRONULAC) 10 GM/15ML solution Take 10 g by mouth daily as needed        Menthol-Methyl Salicylate (ICY HOT EXTRA STRENGTH) 10-30 % CREA        metoprolol tartrate (LOPRESSOR) 25 MG tablet Take 6.25 mg by mouth 2 times daily        midodrine (PROAMATINE) 5 MG tablet Take 15 mg by mouth       Misc. Devices (ROLLATOR ULTRA-LIGHT) MISC Walker with front wheels for home use.       Multiple Vitamins-Minerals (SUPER THERA PRERNA M) TABS Take 1 tablet by mouth       OYSCO 500 + D 500-200 MG-UNIT tablet TAKE 1 TABLET BY MOUTH TWICE DAILY WITH MEALS 60 tablet 3     Psyllium 58.12 % PACK Bid daily       vitamin B1 (VITAMIN B-1) 100  MG tablet Take 100 mg by mouth         PAST SURGICAL HISTORY:  Past Surgical History:   Procedure Laterality Date     COLONOSCOPY      [facility] 2020     GI SURGERY         ALLERGIES:   No Known Allergies    FAMILY HISTORY:  Family History   Problem Relation Age of Onset     Coronary Artery Disease Father      Hypertension Maternal Grandfather          SOCIAL HISTORY:  Social History     Tobacco Use     Smoking status: Never Smoker     Smokeless tobacco: Never Used   Substance Use Topics     Alcohol use: Not Currently     Frequency: Never     Drinks per session: Patient refused     Binge frequency: Never     Comment: Quit 11/2019     Drug use: Not Currently       ROS:   Constitutional: No fever, chills, or sweats. Weight stable.   ENT: No visual disturbance, ear ache, epistaxis, sore throat.   Cardiovascular: As per HPI.   Respiratory: No cough, hemoptysis.    GI: No nausea, vomiting, hematemesis, melena, or hematochezia.   : No hematuria.   Integument: Negative.   Psychiatric: Negative.   Hematologic:  No easy bruising, no easy bleeding.  Neuro: Negative.   Endocrinology: No significant heat or cold intolerance   Musculoskeletal: No myalgia.    Exam:  Physical Exam Elements attainable via telehealth:       Constitutional - alert and no distress    Eyes - no redness, no discharge    Respiratory - no cough, no labored breathing    Skin - no discoloration or lesions on the face or the arm.    Neurological -alert, normal speech,and affect, no tremor.     I have reviewed the labs and personally reviewed the imaging below and made my comment in the assessment and plan.    Labs:  CBC RESULTS:   Lab Results   Component Value Date    WBC 6.1 07/15/2020    RBC 3.15 (L) 07/15/2020    HGB 9.8 (L) 07/15/2020    HCT 30.6 (L) 07/15/2020    MCV 97 07/15/2020    MCH 31.1 07/15/2020    MCHC 32.0 07/15/2020    RDW 19.0 (H) 07/15/2020     07/15/2020       BMP RESULTS:  Lab Results   Component Value Date     07/15/2020     POTASSIUM 3.7 07/15/2020    CHLORIDE 99 07/15/2020    CO2 30 07/15/2020    ANIONGAP 8 07/15/2020    GLC 91 07/15/2020    BUN 32 (H) 07/15/2020    CR 2.15 (H) 07/15/2020    GFRESTIMATED 39 (L) 07/15/2020    GFRESTBLACK 45 (L) 07/15/2020    JILLIAN 8.8 07/15/2020        INR RESULTS:  Lab Results   Component Value Date    INR 1.37 (H) 07/15/2020    INR 1.48 (H) 03/10/2020         Dobutamine stress echocardiogram 7/15/2020  Normal, low-risk dobutamine echocardiogram without evidence of ischemia at a  diagnostic workload (87% MPHR.)  The target heart rate was achieved.  Patient reported jaw pain during dobutamine infusion which resolved in the  recovery phase.  Normal biventricular size, thickness, and global systolic function at  baseline, LVEF=60-65%.  With low-dose dobutamine, LVEF augmented and LV cavity size decreased  appropriately.  With peak dobutamine, LVEF increased further to >70% and LV cavity size  decreased appropriately.  No regional wall motion abnormalities at rest or with dobutamine.  No ECG evidence of ischemia. Normal heart rate and blood pressure response to  dobutamine.  No significant valvular abnormalities are noted on screening Doppler exam.  The aortic root and visualized ascending aorta are normal.  PASP cannot be estimated, however PA pressure is probably normal based on  normal PA acceleration time (180 msec.)      EKG 6/21/2020:        Assessment and Plan:   Mr. Christo Herrera is a 35 year old  male with PMH significant for CKD on dialysis (started a month ago) anemia of chronic disease and alcoholic cirrhosis currently being evaluated for liver transplant.      1.  Cardiovascular evaluation prior to liver transplant: Patient reports no concerning cardiac symptoms at this time.  Reports fairly good functional capacity.  Dobutamine stress echocardiogram 7/15/2020 which I have personally reviewed shows normal biventricular function with no valvular disease.  No concerning findings for  pulmonary hypertension.  Patient underwent pericardiocentesis a month ago in the setting of worsening kidney function.  Since then he is on dialysis.  No recurrent pericardial effusion on the most recent echocardiogram.  Patient can proceed to planned liver transplant without any further cardiac testing.    2.  CKD on dialysis: Continues to be on torsemide 100 mg daily.  Takes midodrine 15 mg 3 times daily for blood pressure support.    No medication changes today.  Return to clinic as needed.    A total of 20 minutes spent face-toface through video encounter today with greater than 50% of the time spent in counseling and coordinating cares of the issues above.     Please donot hesitate to contact me if you have any questions or concerns. Again, thank you for allowing me to participate in the care of your patient.    Jada ENGLAND MD  [facility] Division of Cardiology  Pager [phone]    Please do not hesitate to contact me if you have any questions/concerns.     Sincerely,     Jada England MD

## 2020-07-20 NOTE — PATIENT INSTRUCTIONS
Patient Instructions:    1.  Cleared for liver transplant.    2.  Return to clinic as needed.      It was a pleasure to see you in the cardiology clinic today.    We are encouraging the use of [employer] to communicate with your Healthcare Provider.  If you have any questions, call  Anoop Islas LPN, at [phone].  Press Option #1 for the [facility], and then press Option #4 for nursing.  Cardiology Fax  : [phone]      If you have an urgent need after hours (8:00 am to 4:30 pm) please call [phone] and ask for the cardiology fellow on call.
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
## 2020-07-21 ASSESSMENT — PATIENT HEALTH QUESTIONNAIRE - PHQ9: SUM OF ALL RESPONSES TO PHQ QUESTIONS 1-9: 2

## 2020-07-21 ASSESSMENT — ANXIETY QUESTIONNAIRES: GAD7 TOTAL SCORE: 1

## 2020-07-22 ENCOUNTER — AMBULATORY - HEALTHEAST (OUTPATIENT)
Dept: ULTRASOUND IMAGING | Facility: CLINIC | Age: 35
End: 2020-07-22

## 2020-07-22 ENCOUNTER — AMBULATORY - HEALTHEAST (OUTPATIENT)
Dept: SCHEDULING | Facility: CLINIC | Age: 35
End: 2020-07-22

## 2020-07-22 DIAGNOSIS — Z11.59 ENCOUNTER FOR SCREENING FOR OTHER VIRAL DISEASES: ICD-10-CM

## 2020-07-22 DIAGNOSIS — R18.8 OTHER ASCITES: ICD-10-CM

## 2020-07-26 ENCOUNTER — AMBULATORY - HEALTHEAST (OUTPATIENT)
Dept: FAMILY MEDICINE | Facility: CLINIC | Age: 35
End: 2020-07-26

## 2020-07-26 DIAGNOSIS — Z11.59 ENCOUNTER FOR SCREENING FOR OTHER VIRAL DISEASES: ICD-10-CM

## 2020-07-27 ENCOUNTER — TELEPHONE (OUTPATIENT)
Dept: TRANSPLANT | Facility: CLINIC | Age: 35
End: 2020-07-27

## 2020-07-27 NOTE — TELEPHONE ENCOUNTER
Transplant Social Work Services Phone Call      Data: Received call from patient's father Nelson requesting information about [facility].  Intervention/Education: I provided contact for the above clinic [phone].  Assessment: Patient has a chipped front tooth and his father is requesting he have it fixed here because [facility] has all of his medical records.  Plan: I will remain available for the psychosocial needs of this patient.      KAYA Lacey, [facility]  Liver Transplant   Phone [phone]  Pager [phone]

## 2020-07-27 NOTE — TELEPHONE ENCOUNTER
Left message for patient to schedule Transplant Nephrology  appointments.  Asked patient to call back to schedule.

## 2020-07-29 ENCOUNTER — HOSPITAL ENCOUNTER (OUTPATIENT)
Dept: ULTRASOUND IMAGING | Facility: CLINIC | Age: 35
Discharge: HOME | End: 2020-07-29
Attending: INTERNAL MEDICINE | Admitting: RADIOLOGY

## 2020-07-29 DIAGNOSIS — R18.8 OTHER ASCITES: ICD-10-CM

## 2020-07-29 NOTE — TELEPHONE ENCOUNTER
Spoke with the patient and confirmed Nephrology appointment on 8/12/20 and Hepatology appointments on 10/14/20.  Informed patient an itinerary can be accessed on [employer], and will be sent via mail.

## 2020-08-03 ENCOUNTER — VIRTUAL VISIT (OUTPATIENT)
Dept: BEHAVIORAL HEALTH | Facility: CLINIC | Age: 35
End: 2020-08-03
Payer: COMMERCIAL

## 2020-08-03 DIAGNOSIS — F43.20 ADJUSTMENT DISORDER, UNSPECIFIED TYPE: Primary | ICD-10-CM

## 2020-08-03 DIAGNOSIS — F10.21 ALCOHOL USE DISORDER, SEVERE, IN EARLY REMISSION (H): ICD-10-CM

## 2020-08-03 NOTE — PROGRESS NOTES
"[facility] Integrated Behavioral Health  August 3, 2020      Behavioral Health Clinician Progress Note    Patient Name: Christo Herrera           Service Type: Video visit over Mamina Shkola      Service Location:  virtual      Session Start Time: 10:00  Session End Time: 10:40      Session Length: 38 - 52      Attendees: Patient    Visit Activities (Refresh list every visit): [facility] Only    Diagnostic Assessment Date: 7/20/20  Treatment Plan Review Date: in progress.   See Flowsheets for today's PHQ-9 and BERTA-7 results  Previous PHQ-9:   PHQ-9 SCORE 7/20/2020   PHQ-9 Total Score MyChart 2 (Minimal depression)   PHQ-9 Total Score 2     Previous BERTA-7:   BERTA-7 SCORE 7/20/2020   Total Score 1 (minimal anxiety)   Total Score 1       CHONG LEVEL:  No flowsheet data found.    DATA  Extended Session (60+ minutes): No  Interactive Complexity: No  Crisis: No    Treatment Objective(s) Addressed in This Session:  Target Behavior(s): disease management/lifestyle changes related to maintaining sobriety from alcohol    Alcohol / Substance Use: will increase understanding of the effects of substance use  continue to make healthy choices regarding substance use and engage in activities / supportive services that promote sobriety    Current Stressors / Issues:  This writer met with Christo for 40 minutes today. Christo states he is doing well overall. He reports continuing to maintain lifestyle changes pertaining to his sobriety from alcohol. He reports improving his diet, increasing his daily activity levels, and engaging with healthy social support. He reports not having any concern about returning to alcohol use due to his medical history and pursuit of transplant. We reviewed ways to improve sober support and relapse prevention strategies during our session. This writer made use of the barge metaphor to help Christo identify \"tethers\" or supports to help him be successful in his recovery. Christo " identifeid his family, lifestyle changes, and weekly check-ins with his substance use counselor as good supports for him. We discussed increasing his participation in sober community programs like SMART recovery or AA. This writer provided Christo information about SMART recovery and encouraged him to look into this program for an additional support.     We also discussed treatment options for [facility] services. Christo states he is doing well emotionally and is coping adequately with the transplant process and his recovery. Explained to Christo that my recommendation would be for him to continue [facility] services at least monthly to check-in and receive additional psycho-education as needed and support in his pursuit of transplant, as this can be an arduous process and risk of relapse is high without adequate supports in place. Christo agreed, noting he would find this helpful. Ended the session with scheduling.       Progress on Treatment Objective(s) / Homework:  Satisfactory progress - MAINTENANCE (Working to maintain change, with risk of relapse); Intervened by continuing to positively reinforce healthy behavior choice     Motivational Interviewing    MI Intervention: Co-Developed Goal: maintain sobriety, Expressed Empathy/Understanding, Supported Autonomy, Collaboration, Evocation, Permission to raise concern or advise, Open-ended questions, Reflections: simple and complex and Change talk (evoked)     Change Talk Expressed by the Patient: Committment to change Activation    Provider Response to Change Talk: E - Evoked more info from patient about behavior change, A - Affirmed patient's thoughts, decisions, or attempts at behavior change, R - Reflected patient's change talk and S - Summarized patient's change talk statements    Also provided psychoeducation about behavioral health condition, symptoms, and treatment options    Care Plan review completed: Yes    Medication Review:  No current psychiatric medications  prescribed    Medication Compliance:  NA    Changes in Health Issues:   None reported    Chemical Use Review:   Substance Use: Chemical use reviewed, no active concerns identified      Tobacco Use: No current tobacco use.      Assessment: Current Emotional / Mental Status (status of significant symptoms):  Risk status (Self / Other harm or suicidal ideation)  Patient denies a history of suicidal ideation, suicide attempts, self-injurious behavior, homicidal ideation, homicidal behavior and and other safety concerns  Patient denies current fears or concerns for personal safety.  Patient denies current or recent suicidal ideation or behaviors.  Patient denies current or recent homicidal ideation or behaviors.  Patient denies current or recent self injurious behavior or ideation.  Patient denies other safety concerns.  A safety and risk management plan has not been developed at this time, however patient was encouraged to call [place] [phone] should there be a change in any of these risk factors.    Appearance:   Appropriate   Eye Contact:   Good   Psychomotor Behavior: Normal   Attitude:   Cooperative   Orientation:   All  Speech   Rate / Production: Normal    Volume:  Normal   Mood:    Normal  Affect:    Appropriate   Thought Content:  Clear   Thought Form:  Coherent  Logical   Insight:    Good     Diagnoses:  1. Adjustment disorder, unspecified type    2. Alcohol use disorder, severe, in early remission (H)        Collateral Reports Completed:  Not Applicable    Plan: (Homework, other):  Christo is scheduled to continue [facility] services for mild adjustment related distress pertaining to transplant and help maintaining sobriety in 3 weeks. No immediate safety or CD issues were identified.     Anoop Renae LP  8/3/2020

## 2020-08-04 NOTE — TELEPHONE ENCOUNTER
"Addendum on 8/4/20: Received voice message from patient's father Nelson reporting difficulty scheduling with [facility] without a \"referral.\"    I completed a three way call with [facility] ([facility]).  They are not accepting new patients due to COVID, and advised patient/father to contact patient's primary dental clinic.  Father confirms patient has a primary dental clinic and he will contact the clinic.      KAYA Lacey, [facility]  Liver Transplant   Phone [phone]  Pager [phone]   "

## 2020-08-11 NOTE — PROGRESS NOTES
"Christo Herrera is a 35 year old male who is being evaluated via a billable video visit.      The patient has been notified of following:     \"This video visit will be conducted via a call between you and your physician/provider. We have found that certain health care needs can be provided without the need for an in-person physical exam.  This service lets us provide the care you need with a video conversation.  If a prescription is necessary we can send it directly to your pharmacy.  If lab work is needed we can place an order for that and you can then stop by our lab to have the test done at a later time.    Video visits are billed at different rates depending on your insurance coverage.  Please reach out to your insurance provider with any questions.    If during the course of the call the physician/provider feels a video visit is not appropriate, you will not be charged for this service.\"    Patient has given verbal consent for Video visit? Yes  How would you like to obtain your AVS? MyChart  If you are dropped from the video visit, the video invite should be resent to: Text to cell phone: [phone]  Will anyone else be joining your video visit? No        Video-Visit Details    Type of service:  Video Visit    Video Start Time: 1002am  Video End Time: 1035am    Originating Location (pt. Location): Home    Distant Location (provider location):  [facility]     Platform used for Video Visit: Johnny Jj PA-C            TRANSPLANT NEPHROLOGY RECIPIENT EVALUATION NOTE    Assessment and Plan:  # Kidney Transplant Evaluation    # MARYBEL requiring dialysis: back on dialysis since June 2020 after being on from November 2019 to May 2020. There is a chance he may have some renal recovery and come off dialysis, but with continued need for large volume paracenteses, his kidney function is likely to decline again. He qualifies for SLK if his kidney function does not recover, but liver " alone if kidney function does recover, followed by safety net kidney if needed.     # ESLD from alcoholic cirrhosis: complicated by grade I esophageal varices (EGD December 2019), ascites requiring intermittent paracenteses (last done July 30), and hepatic encephalopathy on lactulose (last admit January 2020 for acute exacerbation). Currently undergoing evaluation for liver transplant.     # Compliance: patient admits to not complying with diet and medication recommendations prior to re-initiating dialysis, but reports compliance now, which should be verified. He denied relapse of alcohol at that time.      Discussed the risks and benefits of a transplant, including the risk of surgery and immunosuppression medications.    Patient was seen in conjunction with Dr. Horace Chandler as part of a shared visit.    Evaluation:  Christo Herrera was seen in consultation for evaluation as a potential kidney transplant recipient.    Reason for Visit:  Christo Herrera is a 35-year-old male who presents for kidney transplant evaluation.    History of Present Illness:  Mr. Herrera is a 35-year-old male with ESLD from alcoholic cirrhosis complicated by grade I esophageal varices (EGD December 2019), ascites requiring intermittent paracenteses (last done July 30), and hepatic encephalopathy on lactulose (last admit January 2020 for acute exacerbation). He reports being sober from alcohol since late 2019. He is currently being evaluated for liver transplant and is here to further discuss kidney transplant given current need for dialysis.     He originally presented in November 2019 with acute alcoholic hepatitis and MARYBEL with creatinine of 1.6-1.9 mg/dl, with one prior creatinine for comparison that was normal in 2012. He was discharged, but readmitted shortly after in December 2019 with sepsis, ongoing poor liver function, and worsening kidney function necessitating dialysis initiation. He dialyzed until May 2020 and was then able  to come off with a creatinine in the mid 1's mg/dl. He presented again in June 2020 with MARYBEL (creatinine 9 mg/dl) and was reinitiated on dialysis and has remained on it since. He had a pericardial effusion requiring pericardiocentesis x 2, thought likely uremic. He also had a possible seizure during that admission and has since been on Keppra without recurrence of symptoms. Just prior to reinitiating dialysis, he was requiring large volume paracenteses again. He denied relapse with alcohol at that time, but did admit to being non-compliant with medications and diet. He reports complete compliance at this time with dialysis and medications. Dialysis has been going OK. He's taking midodrine 15 mg daily with current systolic BPs in the low 100's mmHg. He is still needing paracenteses ever 1-2 months.     He was started on glipizide in January 2020 following prednisone taper that was prescribed for acute alcoholic hepatitis. He is no longer on oral agents. Glucose has normalized off prednisone. No family history of kidney disease. No history of stones or UTI. No gross hematuria. Renal imaging has been normal. Urinalyses with protein, quantified at 0.3 g/g in March 2020 and 0.8 g/g June 2020.          Cardiac/Vascular Disease Risk Factors:        - Pericardial effusion, as above. Repeat ECHO July 2020 with small pericardial effusion.         Volume Status/Weight:        Current weight 135 lbs         Functional Capacity/Frailty:        He's been increasing his walking lately and is up to about 8-9 blocks, which takes him about 20-25 minutes. He denies chest pain, SOB, or claudication symptoms with exertion.      Fatigue/Decreased Energy: [x] No [] Yes    Chest Pain or SOB with Exertion: [x] No [] Yes    Significant Weight Change: [x] No [] Yes    Nausea, Vomiting or Diarrhea: [x] No [] Yes    Fever, Sweats or Chills:  [x] No [] Yes    Leg Swelling [x] No [] Yes        Review of Systems:  A comprehensive review of systems  was obtained and negative, except as noted in the HPI or PMH.     Past Medical History:   Medical record was reviewed and PMH was discussed with patient and noted below.  Past Medical History:   Diagnosis Date     Alcoholic cirrhosis of liver with ascites (H) 3/10/2020     Ascites      ESRD (end stage renal disease) (H)      GERD (gastroesophageal reflux disease)      HE (hepatic encephalopathy) (H)      History of blood transfusion        Past Social History:   Past Surgical History:   Procedure Laterality Date     COLONOSCOPY      [facility] 2020     GI SURGERY       Personal history of bleeding or anesthesia problems: No    Family History:  Family History   Problem Relation Age of Onset     Coronary Artery Disease Father      Hypertension Maternal Grandfather        Personal History:   Social History     Socioeconomic History     Marital status: Single     Spouse name: Not on file     Number of children: Not on file     Years of education: Not on file     Highest education level: 12th grade   Occupational History     Not on file   Social Needs     Financial resource strain: Not on file     Food insecurity     Worry: Never true     Inability: Never true     Transportation needs     Medical: No     Non-medical: No   Tobacco Use     Smoking status: Never Smoker     Smokeless tobacco: Never Used   Substance and Sexual Activity     Alcohol use: Not Currently     Frequency: Never     Drinks per session: Patient refused     Binge frequency: Never     Comment: Quit 11/2019     Drug use: Not Currently     Sexual activity: Not on file   Lifestyle     Physical activity     Days per week: 7 days     Minutes per session: 90 min     Stress: Only a little   Relationships     Social connections     Talks on phone: More than three times a week     Gets together: Once a week     Attends Baptism service: Never     Active member of club or organization: No     Attends meetings of clubs or organizations: Never     Relationship  status: Patient refused     Intimate partner violence     Fear of current or ex partner: Not on file     Emotionally abused: Not on file     Physically abused: Not on file     Forced sexual activity: Not on file   Other Topics Concern     Parent/sibling w/ CABG, MI or angioplasty before 65F 55M? Not Asked   Social History Narrative     Not on file       Allergies:  No Known Allergies    Medications:  Current Outpatient Medications   Medication Sig     Cholecalciferol (VITAMIN D HIGH POTENCY) 25 MCG (1000 UT) CAPS TAKE ONE CAPSULE BY MOUTH DAILY     ciprofloxacin (CIPRO) 250 MG tablet Take one 250 mg tab every 24 hours AFTER dialysis for 7 days. (Patient not taking: Reported on 6/16/2020)     FEROSUL 325 (65 Fe) MG tablet      guaiFENesin (MUCINEX) 600 MG 12 hr tablet Take 600 mg by mouth 2 times daily as needed for congestion or cough      lactulose (CHRONULAC) 10 GM/15ML solution Take 10 g by mouth daily as needed      Menthol-Methyl Salicylate (ICY HOT EXTRA STRENGTH) 10-30 % CREA      midodrine (PROAMATINE) 5 MG tablet Take 15 mg by mouth     Misc. Devices (ROLLATOR ULTRA-LIGHT) MISC Walker with front wheels for home use.     Multiple Vitamins-Minerals (SUPER THERA PRERNA M) TABS Take 1 tablet by mouth     OYSCO 500 + D 500-200 MG-UNIT tablet TAKE 1 TABLET BY MOUTH TWICE DAILY WITH MEALS     Psyllium 58.12 % PACK Bid daily     torsemide (DEMADEX) 100 MG tablet Take 100 mg by mouth daily     vitamin B1 (VITAMIN B-1) 100 MG tablet Take 100 mg by mouth     No current facility-administered medications for this visit.        Exam:  GENERAL: Healthy, alert and no distress  EYES: Eyes grossly normal to inspection.  No discharge or erythema, or obvious scleral/conjunctival abnormalities.  RESP: No audible wheeze, cough, or visible cyanosis.  No visible retractions or increased work of breathing.    SKIN: Visible skin clear. No significant rash, abnormal pigmentation or lesions.  NEURO: Cranial nerves grossly intact.   Mentation and speech appropriate for age.  PSYCH: Mentation appears normal, affect normal/bright, judgement and insight intact, normal speech and appearance well-groomed.      Patient was seen by myself, Dr. Horace Chandler, in conjunction with Leonila Jj PA-C as part of a shared visit.    I personally reviewed past medical and surgical history, vital signs, medications and labs.  Present and past medical history, along with significant physical exam findings were all reviewed with LUIS.    My hathaway findings:  Christo Herrera is 35 year old, who presents for Kidney transplant evaluation.    Key management decisions made by me and discussed with LUIS:  1.  CKD with repeated acute kidney injuries requiring dialysis currently on dialysis with stable creatinine between runs and none oliguric state.  2.  Kidney transplant candidacy evaluation.  3.  End-stage liver disease secondary to alcohol cirrhosis with hepatorenal syndrome most likely type II with occasional breakthrough.  4.  Ascites related to alcohol cirrhosis with the need for large-volume paracentesis with repeated MARYBEL's.  5.  Brief history of impaired glucose control.  6.  History of alcoholism and possible medical nonadherence  7.  Need for formal psychosocial assessment.  8.  Need for cardiovascular risk stratification prior to proceeding with major transplant surgery.  Discussion:  Overall Mr. Herrera is a 35-year-old gentleman with history of alcohol cirrhosis and hepatorenal syndrome leading to his kidney dysfunction.  He is currently dialyzing twice weekly and being weaned off of dialysis.  At the time of diagnosis, he had some kidney injury that recovered nicely and subsequently decompensated acutely so that his ascites.  We discussed this at great length and I raised the the question of nonadherence with medical advice.  While patient adamantly denied alcohol relapse, he admitted to not adhering with diet and medications.  He reports that he had  learned from this experience and moving forward he will be tightly adherent to medical advice.  At this juncture, Christo may recover enough kidney functions to stay off of dialysis.  Nonetheless, he may benefit from simultaneous kidney with his liver transplant if he remains on dialysis or fails to recover kidney functions above 20 mL/min.  Given his young age and potential for kidney recovery, it is reasonable for Christo to undergo liver transplant alone as he qualifies for safety net afterwards to see if his native kidney recovers enough to support his liver transplant.  In summary, Christo is a kidney candidate conditional on his liver transplantation.  He will qualify for liver transplant alone followed by a kidney if he recovers his kidney functions enough and come off of dialysis, or simultaneous liver kidney if remains on dialysis at the time of liver transplantation.  The remainder of the work-up will be determined and completed by the liver transplant team.  Patient was instructed to follow-up with his current nephrologist Dr. Nida Monique and our hepatology team.  Final candidacy recommendation will be made during our multidisciplinary meeting.  Thank you for the consultation.

## 2020-08-12 ENCOUNTER — VIRTUAL VISIT (OUTPATIENT)
Dept: TRANSPLANT | Facility: CLINIC | Age: 35
End: 2020-08-12
Attending: INTERNAL MEDICINE
Payer: COMMERCIAL

## 2020-08-12 VITALS
WEIGHT: 135 LBS | SYSTOLIC BLOOD PRESSURE: 108 MMHG | BODY MASS INDEX: 21.19 KG/M2 | HEART RATE: 90 BPM | DIASTOLIC BLOOD PRESSURE: 75 MMHG | HEIGHT: 67 IN

## 2020-08-12 DIAGNOSIS — Z76.82 KIDNEY TRANSPLANT CANDIDATE: Primary | ICD-10-CM

## 2020-08-12 RX ORDER — LEVETIRACETAM 500 MG/1
TABLET ORAL
COMMUNITY
Start: 2020-08-01 | End: 2021-08-11

## 2020-08-12 RX ORDER — PANTOPRAZOLE SODIUM 40 MG/1
40 TABLET, DELAYED RELEASE ORAL
COMMUNITY
Start: 2020-08-11 | End: 2021-08-11

## 2020-08-12 RX ORDER — GABAPENTIN 100 MG/1
100 CAPSULE ORAL
COMMUNITY
Start: 2020-08-11

## 2020-08-12 SDOH — HEALTH STABILITY: MENTAL HEALTH: HOW MANY STANDARD DRINKS CONTAINING ALCOHOL DO YOU HAVE ON A TYPICAL DAY?: NOT ASKED

## 2020-08-12 SDOH — HEALTH STABILITY: MENTAL HEALTH: HOW OFTEN DO YOU HAVE 6 OR MORE DRINKS ON ONE OCCASION?: NOT ASKED

## 2020-08-12 SDOH — HEALTH STABILITY: MENTAL HEALTH: HOW OFTEN DO YOU HAVE A DRINK CONTAINING ALCOHOL?: NOT ASKED

## 2020-08-12 ASSESSMENT — MIFFLIN-ST. JEOR: SCORE: 1510.99

## 2020-08-12 NOTE — LETTER
"    8/12/2020         RE: Christo Herrera  [address] [place] [address]        Dear Colleague,    Thank you for referring your patient, Christo Herrera, to the [facility]. Please see a copy of my visit note below.    Christo Herrera is a 35 year old male who is being evaluated via a billable video visit.      The patient has been notified of following:     \"This video visit will be conducted via a call between you and your physician/provider. We have found that certain health care needs can be provided without the need for an in-person physical exam.  This service lets us provide the care you need with a video conversation.  If a prescription is necessary we can send it directly to your pharmacy.  If lab work is needed we can place an order for that and you can then stop by our lab to have the test done at a later time.    Video visits are billed at different rates depending on your insurance coverage.  Please reach out to your insurance provider with any questions.    If during the course of the call the physician/provider feels a video visit is not appropriate, you will not be charged for this service.\"    Patient has given verbal consent for Video visit? Yes  How would you like to obtain your AVS? MyChart  If you are dropped from the video visit, the video invite should be resent to: Text to cell phone: [phone]  Will anyone else be joining your video visit? No        Video-Visit Details    Type of service:  Video Visit    Video Start Time: 1002am  Video End Time: 1035am    Originating Location (pt. Location): Home    Distant Location (provider location):  [facility]     Platform used for Video Visit: Johnny Jj PA-C            TRANSPLANT NEPHROLOGY RECIPIENT EVALUATION NOTE    Assessment and Plan:  # Kidney Transplant Evaluation    # MARYBEL requiring dialysis: back on dialysis since June 2020 after being on from November 2019 to " May 2020. There is a chance he may have some renal recovery and come off dialysis, but with continued need for large volume paracenteses, his kidney function is likely to decline again. He qualifies for SLK if his kidney function does not recover, but liver alone if kidney function does recover, followed by safety net kidney if needed.     # ESLD from alcoholic cirrhosis: complicated by grade I esophageal varices (EGD December 2019), ascites requiring intermittent paracenteses (last done July 30), and hepatic encephalopathy on lactulose (last admit January 2020 for acute exacerbation). Currently undergoing evaluation for liver transplant.     # Compliance: patient admits to not complying with diet and medication recommendations prior to re-initiating dialysis, but reports compliance now, which should be verified. He denied relapse of alcohol at that time.      Discussed the risks and benefits of a transplant, including the risk of surgery and immunosuppression medications.    Patient was seen in conjunction with Dr. Horace Chandler as part of a shared visit.    Evaluation:  Christo Herrera was seen in consultation for evaluation as a potential kidney transplant recipient.    Reason for Visit:  Christo Herrera is a 35-year-old male who presents for kidney transplant evaluation.    History of Present Illness:  Mr. Herrera is a 35-year-old male with ESLD from alcoholic cirrhosis complicated by grade I esophageal varices (EGD December 2019), ascites requiring intermittent paracenteses (last done July 30), and hepatic encephalopathy on lactulose (last admit January 2020 for acute exacerbation). He reports being sober from alcohol since late 2019. He is currently being evaluated for liver transplant and is here to further discuss kidney transplant given current need for dialysis.     He originally presented in November 2019 with acute alcoholic hepatitis and MARYBEL with creatinine of 1.6-1.9 mg/dl, with one prior  creatinine for comparison that was normal in 2012. He was discharged, but readmitted shortly after in December 2019 with sepsis, ongoing poor liver function, and worsening kidney function necessitating dialysis initiation. He dialyzed until May 2020 and was then able to come off with a creatinine in the mid 1's mg/dl. He presented again in June 2020 with MARYBEL (creatinine 9 mg/dl) and was reinitiated on dialysis and has remained on it since. He had a pericardial effusion requiring pericardiocentesis x 2, thought likely uremic. He also had a possible seizure during that admission and has since been on Keppra without recurrence of symptoms. Just prior to reinitiating dialysis, he was requiring large volume paracenteses again. He denied relapse with alcohol at that time, but did admit to being non-compliant with medications and diet. He reports complete compliance at this time with dialysis and medications. Dialysis has been going OK. He's taking midodrine 15 mg daily with current systolic BPs in the low 100's mmHg. He is still needing paracenteses ever 1-2 months.     He was started on glipizide in January 2020 following prednisone taper that was prescribed for acute alcoholic hepatitis. He is no longer on oral agents. Glucose has normalized off prednisone. No family history of kidney disease. No history of stones or UTI. No gross hematuria. Renal imaging has been normal. Urinalyses with protein, quantified at 0.3 g/g in March 2020 and 0.8 g/g June 2020.          Cardiac/Vascular Disease Risk Factors:        - Pericardial effusion, as above. Repeat ECHO July 2020 with small pericardial effusion.         Volume Status/Weight:        Current weight 135 lbs         Functional Capacity/Frailty:        He's been increasing his walking lately and is up to about 8-9 blocks, which takes him about 20-25 minutes. He denies chest pain, SOB, or claudication symptoms with exertion.      Fatigue/Decreased Energy: [x] No [] Yes     Chest Pain or SOB with Exertion: [x] No [] Yes    Significant Weight Change: [x] No [] Yes    Nausea, Vomiting or Diarrhea: [x] No [] Yes    Fever, Sweats or Chills:  [x] No [] Yes    Leg Swelling [x] No [] Yes        Review of Systems:  A comprehensive review of systems was obtained and negative, except as noted in the HPI or PMH.     Past Medical History:   Medical record was reviewed and PMH was discussed with patient and noted below.  Past Medical History:   Diagnosis Date     Alcoholic cirrhosis of liver with ascites (H) 3/10/2020     Ascites      ESRD (end stage renal disease) (H)      GERD (gastroesophageal reflux disease)      HE (hepatic encephalopathy) (H)      History of blood transfusion        Past Social History:   Past Surgical History:   Procedure Laterality Date     COLONOSCOPY      [facility] 2020     GI SURGERY       Personal history of bleeding or anesthesia problems: No    Family History:  Family History   Problem Relation Age of Onset     Coronary Artery Disease Father      Hypertension Maternal Grandfather        Personal History:   Social History     Socioeconomic History     Marital status: Single     Spouse name: Not on file     Number of children: Not on file     Years of education: Not on file     Highest education level: 12th grade   Occupational History     Not on file   Social Needs     Financial resource strain: Not on file     Food insecurity     Worry: Never true     Inability: Never true     Transportation needs     Medical: No     Non-medical: No   Tobacco Use     Smoking status: Never Smoker     Smokeless tobacco: Never Used   Substance and Sexual Activity     Alcohol use: Not Currently     Frequency: Never     Drinks per session: Patient refused     Binge frequency: Never     Comment: Quit 11/2019     Drug use: Not Currently     Sexual activity: Not on file   Lifestyle     Physical activity     Days per week: 7 days     Minutes per session: 90 min     Stress: Only a little    Relationships     Social connections     Talks on phone: More than three times a week     Gets together: Once a week     Attends Oriental orthodox service: Never     Active member of club or organization: No     Attends meetings of clubs or organizations: Never     Relationship status: Patient refused     Intimate partner violence     Fear of current or ex partner: Not on file     Emotionally abused: Not on file     Physically abused: Not on file     Forced sexual activity: Not on file   Other Topics Concern     Parent/sibling w/ CABG, MI or angioplasty before 65F 55M? Not Asked   Social History Narrative     Not on file       Allergies:  No Known Allergies    Medications:  Current Outpatient Medications   Medication Sig     Cholecalciferol (VITAMIN D HIGH POTENCY) 25 MCG (1000 UT) CAPS TAKE ONE CAPSULE BY MOUTH DAILY     ciprofloxacin (CIPRO) 250 MG tablet Take one 250 mg tab every 24 hours AFTER dialysis for 7 days. (Patient not taking: Reported on 6/16/2020)     FEROSUL 325 (65 Fe) MG tablet      guaiFENesin (MUCINEX) 600 MG 12 hr tablet Take 600 mg by mouth 2 times daily as needed for congestion or cough      lactulose (CHRONULAC) 10 GM/15ML solution Take 10 g by mouth daily as needed      Menthol-Methyl Salicylate (ICY HOT EXTRA STRENGTH) 10-30 % CREA      midodrine (PROAMATINE) 5 MG tablet Take 15 mg by mouth     Misc. Devices (ROLLATOR ULTRA-LIGHT) MISC Walker with front wheels for home use.     Multiple Vitamins-Minerals (SUPER THERA PRERNA M) TABS Take 1 tablet by mouth     OYSCO 500 + D 500-200 MG-UNIT tablet TAKE 1 TABLET BY MOUTH TWICE DAILY WITH MEALS     Psyllium 58.12 % PACK Bid daily     torsemide (DEMADEX) 100 MG tablet Take 100 mg by mouth daily     vitamin B1 (VITAMIN B-1) 100 MG tablet Take 100 mg by mouth     No current facility-administered medications for this visit.        Exam:  GENERAL: Healthy, alert and no distress  EYES: Eyes grossly normal to inspection.  No discharge or erythema, or obvious  scleral/conjunctival abnormalities.  RESP: No audible wheeze, cough, or visible cyanosis.  No visible retractions or increased work of breathing.    SKIN: Visible skin clear. No significant rash, abnormal pigmentation or lesions.  NEURO: Cranial nerves grossly intact.  Mentation and speech appropriate for age.  PSYCH: Mentation appears normal, affect normal/bright, judgement and insight intact, normal speech and appearance well-groomed.      Patient was seen by myself, Dr. Horace Chandler, in conjunction with Leoinla Jj PA-C as part of a shared visit.    I personally reviewed past medical and surgical history, vital signs, medications and labs.  Present and past medical history, along with significant physical exam findings were all reviewed with LUIS.    My hathaway findings:  Christo Herrera is 35 year old, who presents for Kidney transplant evaluation.    Key management decisions made by me and discussed with LUIS:  1.  CKD with repeated acute kidney injuries requiring dialysis currently on dialysis with stable creatinine between runs and none oliguric state.  2.  Kidney transplant candidacy evaluation.  3.  End-stage liver disease secondary to alcohol cirrhosis with hepatorenal syndrome most likely type II with occasional breakthrough.  4.  Ascites related to alcohol cirrhosis with the need for large-volume paracentesis with repeated MARYBEL's.  5.  Brief history of impaired glucose control.  6.  History of alcoholism and possible medical nonadherence  7.  Need for formal psychosocial assessment.  8.  Need for cardiovascular risk stratification prior to proceeding with major transplant surgery.  Discussion:  Overall Mr. Herrera is a 35-year-old gentleman with history of alcohol cirrhosis and hepatorenal syndrome leading to his kidney dysfunction.  He is currently dialyzing twice weekly and being weaned off of dialysis.  At the time of diagnosis, he had some kidney injury that recovered nicely and subsequently  decompensated acutely so that his ascites.  We discussed this at great length and I raised the the question of nonadherence with medical advice.  While patient adamantly denied alcohol relapse, he admitted to not adhering with diet and medications.  He reports that he had learned from this experience and moving forward he will be tightly adherent to medical advice.  At this juncture, Christo may recover enough kidney functions to stay off of dialysis.  Nonetheless, he may benefit from simultaneous kidney with his liver transplant if he remains on dialysis or fails to recover kidney functions above 20 mL/min.  Given his young age and potential for kidney recovery, it is reasonable for Christo to undergo liver transplant alone as he qualifies for safety net afterwards to see if his native kidney recovers enough to support his liver transplant.  In summary, Christo is a kidney candidate conditional on his liver transplantation.  He will qualify for liver transplant alone followed by a kidney if he recovers his kidney functions enough and come off of dialysis, or simultaneous liver kidney if remains on dialysis at the time of liver transplantation.  The remainder of the work-up will be determined and completed by the liver transplant team.  Patient was instructed to follow-up with his current nephrologist Dr. Nida Monique and our hepatology team.  Final candidacy recommendation will be made during our multidisciplinary meeting.  Thank you for the consultation.      Again, thank you for allowing me to participate in the care of your patient.        Sincerely,        KOJO

## 2020-08-12 NOTE — PROGRESS NOTES
"Christo Herrera is a 35 year old male who is being evaluated via a billable video visit.      The patient has been notified of following:     \"This video visit will be conducted via a call between you and your physician/provider. We have found that certain health care needs can be provided without the need for an in-person physical exam.  This service lets us provide the care you need with a video conversation.  If a prescription is necessary we can send it directly to your pharmacy.  If lab work is needed we can place an order for that and you can then stop by our lab to have the test done at a later time.    Video visits are billed at different rates depending on your insurance coverage.  Please reach out to your insurance provider with any questions.    If during the course of the call the physician/provider feels a video visit is not appropriate, you will not be charged for this service.\"    Patient has given verbal consent for Video visit? Yes  How would you like to obtain your AVS? MyChart  If you are dropped from the video visit, the video invite should be resent to: Text to cell phone: [phone]  Will anyone else be joining your video visit? No  {If patient encounters technical issues they should call [phone] :202349}      Video-Visit Details    Type of service:  Video Visit    Video Start Time: {video visit start/end time:152948}  Video End Time: {video visit start/end time:152948}    Originating Location (pt. Location): {video visit patient location:251689::\"Home\"}    Distant Location (provider location):  [facility]     Platform used for Video Visit: {Virtual Visit Platforms:338327::\"Audio Network\"}    {signature options:605108}        "

## 2020-08-24 ENCOUNTER — VIRTUAL VISIT (OUTPATIENT)
Dept: BEHAVIORAL HEALTH | Facility: CLINIC | Age: 35
End: 2020-08-24
Payer: COMMERCIAL

## 2020-08-24 DIAGNOSIS — F43.20 ADJUSTMENT DISORDER, UNSPECIFIED TYPE: Primary | ICD-10-CM

## 2020-08-24 DIAGNOSIS — F10.21 ALCOHOL USE DISORDER, SEVERE, IN EARLY REMISSION (H): ICD-10-CM

## 2020-08-24 NOTE — PROGRESS NOTES
[facility]al [facility]  August 24, 2020      Behavioral Health Clinician Progress Note    Patient Name: Christo Herrera           Service Type: Video visit over gauzz      Service Location:  virtual      Session Start Time: 10:04  Session End Time: 10:40      Session Length: 16 - 37      Attendees: Patient    Visit Activities (Refresh list every visit): [facility] Only    Telemedicine Visit: The patient's condition can be safely assessed and treated via synchronous audio and visual telemedicine encounter.      Reason for Telemedicine Visit: COVID-19    Originating Site (Patient Location): Patient's home    Distant Site (Provider Location): Provider Remote Setting    Consent:  The patient/guardian has verbally consented to: the potential risks and benefits of telemedicine (video visit) versus in person care; bill my insurance or make self-payment for services provided; and responsibility for payment of non-covered services.     Mode of Communication:  Video Conference via [employer]    As the provider I attest to compliance with applicable laws and regulations related to telemedicine.      Diagnostic Assessment Date: 7/20/20  Treatment Plan Review Date: in progress. Using initial plan from DA  See Flowsheets for today's PHQ-9 and BERTA-7 results  Previous PHQ-9:   PHQ-9 SCORE 7/20/2020   PHQ-9 Total Score MyChart 2 (Minimal depression)   PHQ-9 Total Score 2     Previous BERTA-7:   BERTA-7 SCORE 7/20/2020   Total Score 1 (minimal anxiety)   Total Score 1       CHONG LEVEL:  No flowsheet data found.    DATA  Extended Session (60+ minutes): No  Interactive Complexity: No  Crisis: No    Treatment Objective(s) Addressed in This Session:  Target Behavior(s): disease management/lifestyle changes related to maintaining sobriety from alcohol    Adjustment Difficulties: will develop coping/problem-solving skills to facilitate more adaptive adjustment  Alcohol / Substance Use: will  increase understanding of the effects of substance use  continue to make healthy choices regarding substance use and engage in activities / supportive services that promote sobriety    Current Stressors / Issues:  Christo reports things are going well overall. He reports some dietary concerns including some low K+ levels in his body, which he is working on. He reports keeping up with his exercise and social supports for help coping with the transplant process. He reports hoping to finish up with dialysis treatment within the next month and reports feeling optimistic about his prognosis. He reports enjoying to talking with fellow patients in dialysis and enjoys this outlet for his good and bad days.     We reviewed supportive maintenance strategies for his sobriety from alcohol. Christo reports keeping busy with exercise and enjoyable activities and getting help/support from his mother, going for walks with his dog, reading books about sobriety/recovery, and  meditation/self-reflection time. He denies significant psychosocial or emotional changes from our previous visit and we explored ways he could continue utilizing these coping strategies so he can maintain his adaptive responses to the SOT process and his recovery from alcohol. We discussed ways to balance activity with resting intentionally for his recovery. Reinforced adaptive strategies he is already using and provided some education as to why they are helpful.     Christo endorses having good social support at home, especially from his parents. We discussed a few circumstances with his mother and father and how he finds support from them helpful.     We discussed ways to more adaptively respond to stress by viewing problems/barriers as challenges instead of obstacles or helpless events. Explored how stressful events could be reframed as challenges to help him. Reviewed a few examples of this idea.     Christo denied having questions today or other areas to  address so we ended the session with scheduling a follow-up in about a month.       Progress on Treatment Objective(s) / Homework:  Satisfactory progress - MAINTENANCE (Working to maintain change, with risk of relapse); Intervened by continuing to positively reinforce healthy behavior choice     Motivational Interviewing    MI Intervention: Co-Developed Goal: maintain sobriety, Expressed Empathy/Understanding, Supported Autonomy, Collaboration, Evocation, Permission to raise concern or advise, Open-ended questions, Reflections: simple and complex and Change talk (evoked)     Change Talk Expressed by the Patient: Committment to change Activation    Provider Response to Change Talk: E - Evoked more info from patient about behavior change, A - Affirmed patient's thoughts, decisions, or attempts at behavior change, R - Reflected patient's change talk and S - Summarized patient's change talk statements    Also provided psychoeducation about behavioral health condition, symptoms, and treatment options    Care Plan review completed: Yes    Medication Review:  No current psychiatric medications prescribed    Medication Compliance:  NA    Changes in Health Issues:   None reported    Chemical Use Review:   Substance Use: Chemical use reviewed, no active concerns identified      Tobacco Use: No current tobacco use.      Assessment: Current Emotional / Mental Status (status of significant symptoms):  Risk status (Self / Other harm or suicidal ideation)  Patient denies a history of suicidal ideation, suicide attempts, self-injurious behavior, homicidal ideation, homicidal behavior and and other safety concerns     Patient denies current fears or concerns for personal safety.  Patient denies current or recent suicidal ideation or behaviors.  Patient denies current or recent homicidal ideation or behaviors.  Patient denies current or recent self injurious behavior or ideation.  Patient denies other safety concerns.     A safety  and risk management plan has not been developed at this time, however patient was encouraged to call [facility] should there be a change in any of these risk factors.    Appearance:   Appropriate   Eye Contact:   Good   Psychomotor Behavior: Normal   Attitude:   Cooperative   Orientation:   All  Speech   Rate / Production: Normal    Volume:  Normal   Mood:    Normal  Affect:    Appropriate   Thought Content:  Clear   Thought Form:  Coherent  Logical   Insight:    Good      Diagnoses:  1. Adjustment disorder, unspecified type    2. Alcohol use disorder, severe, in early remission (H)        Collateral Reports Completed:  Not Applicable    Plan: (Homework, other):  Christo is scheduled to continue [facility] services for mild adjustment related distress pertaining to transplant and help maintaining sobriety in 3 weeks. No immediate safety or CD issues were identified.     Anoop Renae LP  8/24/2020

## 2020-08-25 ENCOUNTER — TELEPHONE (OUTPATIENT)
Dept: GASTROENTEROLOGY | Facility: CLINIC | Age: 35
End: 2020-08-25

## 2020-08-25 ENCOUNTER — COMMITTEE REVIEW (OUTPATIENT)
Dept: TRANSPLANT | Facility: CLINIC | Age: 35
End: 2020-08-25

## 2020-08-25 NOTE — TELEPHONE ENCOUNTER
[facility] Call Center    Phone Message    May a detailed message be left on voicemail: yes     Reason for Call: Question: How often does Dr. Echavarria want any labs related to this pt?   Dr. Sal is checking CMP, CBC and INR weekly for pt.    Action Taken: Message routed to:  [facility]  Hepatology    Travel Screening: Not Applicable

## 2020-08-25 NOTE — COMMITTEE REVIEW
Abdominal Committee Review Note     Evaluation Date: 6/30/2020  Committee Review Date: 8/25/2020    Organ being evaluated for: Liver    Transplant Phase: Evaluation  Transplant Status: Active    Transplant Coordinator: Crhisto Alcala Jr.  Transplant Surgeon:   Josr Broderick    Referring Physician: Luda Caraballo    Primary Diagnosis: Alcoholic Cirrhosis  Secondary Diagnosis:     Committee Review Members:  Nurse Practitioner Katherin Lucio, NP   Nutrition Chanel Cardenas, RD   Pharmacist Fang Liriano, [facility]    - Clinical KAYA Frazier, Arcelia Pierre, [facility]   Transplant Jr Christo Alcala RN, Sherry Cormier MD, Savita Oconnell, APRN CNP, Jaimie Powell, RN, Brandin Echavarria MD, Misha Bowen MD, Cristobal Mg MD, Josr Broderick MD, Thomas M. Leventhal, MD, Ashley Chahal MD, MD       Transplant Eligibility: Cirrhosis with MELD, ETOH, Hepato-renal syndrome req renal replacement therapy for period greater than 4 wks    Committee Review Decision: Approved    Relative Contraindications: None    Absolute Contraindications: None    Committee Chair Leventhal, Thomas Michael, MD verbally attested to the committee's decision.    Committee Discussion Details:     Approved for SLK     List asap

## 2020-08-26 NOTE — TELEPHONE ENCOUNTER
Spoke with Dr Byrne's clinic    Will forward lab order to clinic once listed.     see committee note for listing details.    MELD 26 with dialysis

## 2020-09-14 ENCOUNTER — TELEPHONE (OUTPATIENT)
Dept: TRANSPLANT | Facility: CLINIC | Age: 35
End: 2020-09-14

## 2020-09-14 NOTE — TELEPHONE ENCOUNTER
Transplant Social Work Services Phone Call      Data: Nelson has been approved to be listed for a combined liver and kidney transplant.  Intervention/education: I called patient for psychosocial support.  I emailed him instructions for virtual liver transplant support group and encouraged him to participate.  Assessment: Nelson was hospitalized around early July and was not doing well from a mental health perspective per his father's report.  He was referred to [facility] and Nelson has had three sessions with this provider.  Today, Nelson reports overall improvement in his health, appetite and functional status.  He also reports dialysis will likely be discontinued.  Nelson's outlook on life and his health is very positive.  Plan: I will remain available to patient for his psychosocial needs.       KAYA Lacey, [facility]  Liver Transplant   Phone [phone]  Pager [phone]

## 2020-09-21 ENCOUNTER — VIRTUAL VISIT (OUTPATIENT)
Dept: BEHAVIORAL HEALTH | Facility: CLINIC | Age: 35
End: 2020-09-21
Payer: COMMERCIAL

## 2020-09-21 DIAGNOSIS — F10.21 ALCOHOL USE DISORDER, SEVERE, IN EARLY REMISSION (H): ICD-10-CM

## 2020-09-21 DIAGNOSIS — F43.20 ADJUSTMENT DISORDER, UNSPECIFIED TYPE: Primary | ICD-10-CM

## 2020-09-21 NOTE — PROGRESS NOTES
[facility]: Integrated Behavioral Health  September 21, 2020      Behavioral Health Clinician Progress Note    Patient Name: Christo Herrera           Service Type: Video visit over [employer]      Service Location:  virtual      Session Start Time: 9:10  Session End Time: 9:55      Session Length: 38 - 52      Attendees: Patient    Visit Activities (Refresh list every visit): [facility] Only    Telemedicine Visit: The patient's condition can be safely assessed and treated via synchronous audio and visual telemedicine encounter.      Reason for Telemedicine Visit: COVID-19    Originating Site (Patient Location): Patient's home    Distant Site (Provider Location): Provider Remote Setting    Consent:  The patient/guardian has verbally consented to: the potential risks and benefits of telemedicine (video visit) versus in person care; bill my insurance or make self-payment for services provided; and responsibility for payment of non-covered services.     Mode of Communication:  Video Conference via [employer]    As the provider I attest to compliance with applicable laws and regulations related to telemedicine.      Diagnostic Assessment Date: 7/20/20  Treatment Plan Review Date: 12/21/2020; created today  See Flowsheets for today's PHQ-9 and BERTA-7 results  Previous PHQ-9:   PHQ-9 SCORE 7/20/2020   PHQ-9 Total Score MyChart 2 (Minimal depression)   PHQ-9 Total Score 2     Previous BERTA-7:   BERTA-7 SCORE 7/20/2020   Total Score 1 (minimal anxiety)   Total Score 1       CHONG LEVEL:  No flowsheet data found.    DATA  Extended Session (60+ minutes): No  Interactive Complexity: No  Crisis: No    Treatment Objective(s) Addressed in This Session:  Target Behavior(s): disease management/lifestyle changes related to maintaining sobriety from alcohol    Adjustment Difficulties: will develop coping/problem-solving skills to facilitate more adaptive adjustment  Alcohol / Substance Use: will increase  understanding of the effects of substance use  continue to make healthy choices regarding substance use and engage in activities / supportive services that promote sobriety    Current Stressors / Issues:  Christo reports he is continuing to do well with his engagement in the liver transplant process and denies significant emotional difficulties or concerns. He reports continuing to keep up with his modified low sodium diet and his regular exercise. He notes his family continues to be supportive and helpful. We did discuss his reported mild concern about his brother's irritable mood, which he suspects is partly due to his adjustment to Christo' illness. We brainstormed ways to improve their open dialogues and depth of communication to help Christo and his brother feel more supported. Specifically, we explored how to use open-ended questions to help Christo have more intimate conversations with his brother.    His adjustment to illness and overall mental health picture seems stable. No concerns regarding his mental health were noted today, however we agreed to continue meeting every few weeks to review concerns, increase his support, and address any emergent concerns if needed.       Progress on Treatment Objective(s) / Homework:  Satisfactory progress - MAINTENANCE (Working to maintain change, with risk of relapse); Intervened by continuing to positively reinforce healthy behavior choice     Motivational Interviewing    MI Intervention: Co-Developed Goal: maintain sobriety, Expressed Empathy/Understanding, Supported Autonomy, Collaboration, Evocation, Permission to raise concern or advise, Open-ended questions, Reflections: simple and complex and Change talk (evoked)     Change Talk Expressed by the Patient: Committment to change Activation    Provider Response to Change Talk: E - Evoked more info from patient about behavior change, A - Affirmed patient's thoughts, decisions, or attempts at behavior change, R - Reflected  patient's change talk and S - Summarized patient's change talk statements    Supportive counseling    Care Plan review completed: Yes    Medication Review:  No current psychiatric medications prescribed    Medication Compliance:  NA    Changes in Health Issues:   None reported    Chemical Use Review:   Substance Use: Chemical use reviewed, no active concerns identified      Tobacco Use: No current tobacco use.      Assessment: Current Emotional / Mental Status (status of significant symptoms):  Risk status (Self / Other harm or suicidal ideation)  Patient denies a history of suicidal ideation, suicide attempts, self-injurious behavior, homicidal ideation, homicidal behavior and and other safety concerns     Patient denies current fears or concerns for personal safety.  Patient denies current or recent suicidal ideation or behaviors.  Patient denies current or recent homicidal ideation or behaviors.  Patient denies current or recent self injurious behavior or ideation.  Patient denies other safety concerns.     A safety and risk management plan has not been developed at this time, however patient was encouraged to call [place] [phone] should there be a change in any of these risk factors.    Appearance:   Appropriate   Eye Contact:   Good   Psychomotor Behavior: Normal   Attitude:   Cooperative   Orientation:   All  Speech   Rate / Production: Normal    Volume:  Normal   Mood:    Normal  Affect:    Appropriate   Thought Content:  Clear   Thought Form:  Coherent  Logical   Insight:    Good      Diagnoses:  1. Adjustment disorder, unspecified type    2. Alcohol use disorder, severe, in early remission (H)        Collateral Reports Completed:  Not Applicable    Plan: (Homework, other):  Christo is scheduled to continue [facility] services for mild adjustment related distress pertaining to transplant and help maintaining sobriety in 3 weeks. No immediate safety or CD issues were identified.     Anoop Renae,  LP  9/21/2020    ______________________________________________________________________    [facility] Integrated Behavioral Health Treatment Plan    Client's Name: Christo Herrera  YOB: 1985    Date: 9/20/2020    DSM-V Diagnoses: Adjustment Disorders  309.9 (F43.20) Unspecified; Substance-Related & Addictive Disorders Alcohol Use Disorder   303.90 (F10.20) Severe In early remission, ;  WHODAS: 25    Clinical Global Impressions  First:  Considering your total clinical experience with this particular patient population, how severe are the patient's symptoms at this time?: 2 (9/22/2020  2:43 PM)      Most recent:  Compared to the patient's condition at the START of treatment, this patient's condition is: 2 (9/22/2020  2:43 PM)        Referral / Collaboration:  Will collaborate with care team as indicated during treatment.    Anticipated number of session or this episode of care: as needed; minimum 6      MeasurableTreatment Goal(s) related to diagnosis / functional impairment(s)    Goal 1:  Patient will identify and increase engagement in valued activity, i.e. improving social connections/relationships, pursuing occupational goals or personally meaningful pursuits, exploration of meaning in life.     Objective #A: Patient will identify meaningful activity in social, occupational and  personal goals, and increase behavioral activation around these goals   Status: Continued - Date(s): 9/20/2020    Objective #B: Patient will address relationship difficulties in a more adaptive manner  Status: Continued - Date(s): 9/20/2020    Objective #C: Patient will develop coping/problem-solving skills to facilitate more adaptive adjustment and will effectively address problems that interfere with adaptive functioning  Status: Continued - Date(s): 9/20/20    Goal 2:  Patient will maintain sobriety from all mood-altering substances    Objective #A: Patient will increase understanding of the effects of substance use   Status:  Continued - Date(s): Completed    Objective #B: Patient will develop more effective coping skills to manage anxiety symptoms and increase understanding of triggers  Status: Continued - Date(s): 9/20/20    Objective #C: Patient continue to make healthy choices regarding substance use and engage in activities / supportive services that promote sobriety  Status: Continued - Date(s): 9/20/2020    Possible Therapeutic Intervention(s)  Psycho-education regarding mental health diagnoses and treatment options      Skills training    Explore skills useful to client in current situation.    Skills include assertiveness, communication, conflict management, problem-solving, relaxation, etc.    Solution-Focused Therapy    Explore patterns in patient's relationships and discuss options for new behaviors.    Explore patterns in patient's actions and choices and discuss options for new behaviors.    Cognitive-behavioral Therapy    Discuss common cognitive distortions, identify them in patient's life.    Explore ways to challenge, replace, and act against these cognitions.    Acceptance and Commitment Therapy    Explore and identify important values in patient's life.    Discuss ways to commit to behavioral activation around these values.    Psychodynamic psychotherapy    Discuss patient's emotional dynamics and issues and how they impact behaviors.    Explore patient's history of relationships and how they impact present behaviors.    Explore how to work with and make changes in these schemas and patterns.    Narrative Therapy    Explore the patient's story of his/her life from his/her perspective.    Explore alternate ways of understanding their experience, identifying exceptions, developing new themes.    Interpersonal Psychotherapy    Explore patterns in relationships that are effective or ineffective at helping patient reach their goals, find satisfying experience.    Discuss new patterns or behaviors to engage in for improved  social functioning.    Behavioral Activation    Discuss steps patient can take to become more involved in meaningful activity.    Identify barriers to these activities and explore possible solutions.    Mindfulness-Based Strategies    Discuss skills based on development and application of mindfulness.    Skills drawn from compassion-focused therapy, dialectical behavior therapy, mindfulness-based stress reduction, mindfulness-based cognitive therapy, etc.      We have developed these goals together during our work to this point. Patient has assisted in the development of these goals and has agreed to this treatment plan.       Anoop Renae, ABILIO  September 21, 2020

## 2020-09-23 ENCOUNTER — HOSPITAL ENCOUNTER (OUTPATIENT)
Dept: ULTRASOUND IMAGING | Facility: CLINIC | Age: 35
Discharge: HOME | End: 2020-09-23
Attending: INTERNAL MEDICINE

## 2020-09-24 ENCOUNTER — AMBULATORY - HEALTHEAST (OUTPATIENT)
Dept: ULTRASOUND IMAGING | Facility: CLINIC | Age: 35
End: 2020-09-24

## 2020-09-24 DIAGNOSIS — Z11.59 ENCOUNTER FOR SCREENING FOR OTHER VIRAL DISEASES: ICD-10-CM

## 2020-09-29 DIAGNOSIS — K70.31 ALCOHOLIC CIRRHOSIS OF LIVER WITH ASCITES (H): Primary | ICD-10-CM

## 2020-10-02 ENCOUNTER — DOCUMENTATION ONLY (OUTPATIENT)
Dept: TRANSPLANT | Facility: CLINIC | Age: 35
End: 2020-10-02

## 2020-10-02 DIAGNOSIS — K70.31 ALCOHOLIC CIRRHOSIS OF LIVER WITH ASCITES (H): Primary | ICD-10-CM

## 2020-10-05 ENCOUNTER — HOSPITAL ENCOUNTER (OUTPATIENT)
Dept: ULTRASOUND IMAGING | Facility: CLINIC | Age: 35
Discharge: HOME | End: 2020-10-05
Attending: INTERNAL MEDICINE

## 2020-10-05 DIAGNOSIS — R18.8 OTHER ASCITES: ICD-10-CM

## 2020-10-12 ENCOUNTER — VIRTUAL VISIT (OUTPATIENT)
Dept: BEHAVIORAL HEALTH | Facility: CLINIC | Age: 35
End: 2020-10-12
Payer: COMMERCIAL

## 2020-10-12 DIAGNOSIS — F10.21 ALCOHOL USE DISORDER, SEVERE, IN EARLY REMISSION (H): ICD-10-CM

## 2020-10-12 DIAGNOSIS — F43.20 ADJUSTMENT DISORDER, UNSPECIFIED TYPE: Primary | ICD-10-CM

## 2020-10-12 PROCEDURE — 90834 PSYTX W PT 45 MINUTES: CPT | Mod: 95 | Performed by: PSYCHOLOGIST

## 2020-10-12 NOTE — PROGRESS NOTES
[facility] Integrated Behavioral Health  October 12, 2020      Behavioral Health Clinician Progress Note    Patient Name: Christo Herrera           Service Type: Video visit over [employer]      Service Location:  virtual      Session Start Time: 10:06  Session End Time: 10:50      Session Length: 38 - 52      Attendees: Patient    Visit Activities (Refresh list every visit): [facility] Only    Telemedicine Visit: The patient's condition can be safely assessed and treated via synchronous audio and visual telemedicine encounter.      Reason for Telemedicine Visit: COVID-19    Originating Site (Patient Location): Patient's home    Distant Site (Provider Location): Provider Remote Setting    Consent:  The patient/guardian has verbally consented to: the potential risks and benefits of telemedicine (video visit) versus in person care; bill my insurance or make self-payment for services provided; and responsibility for payment of non-covered services.     Mode of Communication:  Video Conference via [employer]    As the provider I attest to compliance with applicable laws and regulations related to telemedicine.      Diagnostic Assessment Date: 7/20/20  Treatment Plan Review Date: 12/21/2020  See Flowsheets for today's PHQ-9 and BERTA-7 results  Previous PHQ-9:   PHQ-9 SCORE 7/20/2020   PHQ-9 Total Score MyChart 2 (Minimal depression)   PHQ-9 Total Score 2     Previous BERTA-7:   BERTA-7 SCORE 7/20/2020   Total Score 1 (minimal anxiety)   Total Score 1       CHONG LEVEL:  No flowsheet data found.    DATA  Extended Session (60+ minutes): No  Interactive Complexity: No  Crisis: No    Treatment Objective(s) Addressed in This Session:  Target Behavior(s): disease management/lifestyle changes related to maintaining sobriety from alcohol    Adjustment Difficulties: will develop coping/problem-solving skills to facilitate more adaptive adjustment  Alcohol / Substance Use: will increase understanding of the  effects of substance use  continue to make healthy choices regarding substance use and engage in activities / supportive services that promote sobriety    Current Stressors / Issues:  Christo indicates he is doing well overall, citing he is keeping up with his medical appointments and is doing well with his sobriety. He indicates running into some frustration with his paracentesis and how this doesn't remove as much fluid as he would prefer. The majority of our session focused on Christo exploring and discussing his relationship with his father, and his reported tendency to sometimes make comments toward Christo about him not working. We explored his relationship in depth and discussed a few options he could take in improving communication with him. Christo also discussed how his brother is struggling with his own difficulties and we processed how he could talk with him too.      Progress on Treatment Objective(s) / Homework:  Satisfactory progress - MAINTENANCE (Working to maintain change, with risk of relapse); Intervened by continuing to positively reinforce healthy behavior choice     Motivational Interviewing    MI Intervention: Co-Developed Goal: maintain sobriety, Expressed Empathy/Understanding, Supported Autonomy, Collaboration, Evocation, Permission to raise concern or advise, Open-ended questions, Reflections: simple and complex and Change talk (evoked)     Change Talk Expressed by the Patient: Committment to change Activation    Provider Response to Change Talk: E - Evoked more info from patient about behavior change, A - Affirmed patient's thoughts, decisions, or attempts at behavior change, R - Reflected patient's change talk and S - Summarized patient's change talk statements    Supportive maintenance counseling    Care Plan review completed: Yes    Medication Review:  No current psychiatric medications prescribed    Medication Compliance:  NA    Changes in Health Issues:   None reported    Chemical Use  Review:   Substance Use: Chemical use reviewed, no active concerns identified      Tobacco Use: No current tobacco use.      Assessment: Current Emotional / Mental Status (status of significant symptoms):  Risk status (Self / Other harm or suicidal ideation)  Patient denies a history of suicidal ideation, suicide attempts, self-injurious behavior, homicidal ideation, homicidal behavior and and other safety concerns     Patient denies current fears or concerns for personal safety.  Patient denies current or recent suicidal ideation or behaviors.  Patient denies current or recent homicidal ideation or behaviors.  Patient denies current or recent self injurious behavior or ideation.  Patient denies other safety concerns.     A safety and risk management plan has not been developed at this time, however patient was encouraged to call [place] [phone] should there be a change in any of these risk factors.    Appearance:   Appropriate   Eye Contact:   Good   Psychomotor Behavior: Normal   Attitude:   Cooperative   Orientation:   All  Speech   Rate / Production: Normal    Volume:  Normal   Mood:    Normal  Affect:    Appropriate   Thought Content:  Clear   Thought Form:  Coherent  Logical   Insight:    Good      Diagnoses:  1. Adjustment disorder, unspecified type    2. Alcohol use disorder, severe, in early remission (H)        Collateral Reports Completed:  Not Applicable    Plan: (Homework, other):  Christo is scheduled to continue [facility] services for mild adjustment related distress pertaining to transplant and help maintaining sobriety in 2 weeks. No immediate safety or CD issues were identified.     Anoop Renae LP  10/12/2020    ______________________________________________________________________    [facility] Integrated Behavioral Health Treatment Plan    Client's Name: Christo Herrera  YOB: 1985    Date: 9/20/2020    DSM-V Diagnoses: Adjustment Disorders  309.9 (F43.20) Unspecified; Substance-Related &  Addictive Disorders Alcohol Use Disorder   303.90 (F10.20) Severe In early remission, ;  WHODAS: 25    Clinical Global Impressions  First:  Considering your total clinical experience with this particular patient population, how severe are the patient's symptoms at this time?: 2 (9/22/2020  2:43 PM)      Most recent:  Compared to the patient's condition at the START of treatment, this patient's condition is: 2 (9/22/2020  2:43 PM)        Referral / Collaboration:  Will collaborate with care team as indicated during treatment.    Anticipated number of session or this episode of care: as needed; minimum 6      MeasurableTreatment Goal(s) related to diagnosis / functional impairment(s)    Goal 1:  Patient will identify and increase engagement in valued activity, i.e. improving social connections/relationships, pursuing occupational goals or personally meaningful pursuits, exploration of meaning in life.     Objective #A: Patient will identify meaningful activity in social, occupational and  personal goals, and increase behavioral activation around these goals   Status: Continued - Date(s): 9/20/2020    Objective #B: Patient will address relationship difficulties in a more adaptive manner  Status: Continued - Date(s): 9/20/2020    Objective #C: Patient will develop coping/problem-solving skills to facilitate more adaptive adjustment and will effectively address problems that interfere with adaptive functioning  Status: Continued - Date(s): 9/20/20    Goal 2:  Patient will maintain sobriety from all mood-altering substances    Objective #A: Patient will increase understanding of the effects of substance use   Status: Continued - Date(s): Completed    Objective #B: Patient will develop more effective coping skills to manage anxiety symptoms and increase understanding of triggers  Status: Continued - Date(s): 9/20/20    Objective #C: Patient continue to make healthy choices regarding substance use and engage in activities /  supportive services that promote sobriety  Status: Continued - Date(s): 9/20/2020    Possible Therapeutic Intervention(s)  Psycho-education regarding mental health diagnoses and treatment options      Skills training    Explore skills useful to client in current situation.    Skills include assertiveness, communication, conflict management, problem-solving, relaxation, etc.    Solution-Focused Therapy    Explore patterns in patient's relationships and discuss options for new behaviors.    Explore patterns in patient's actions and choices and discuss options for new behaviors.    Cognitive-behavioral Therapy    Discuss common cognitive distortions, identify them in patient's life.    Explore ways to challenge, replace, and act against these cognitions.    Acceptance and Commitment Therapy    Explore and identify important values in patient's life.    Discuss ways to commit to behavioral activation around these values.    Psychodynamic psychotherapy    Discuss patient's emotional dynamics and issues and how they impact behaviors.    Explore patient's history of relationships and how they impact present behaviors.    Explore how to work with and make changes in these schemas and patterns.    Narrative Therapy    Explore the patient's story of his/her life from his/her perspective.    Explore alternate ways of understanding their experience, identifying exceptions, developing new themes.    Interpersonal Psychotherapy    Explore patterns in relationships that are effective or ineffective at helping patient reach their goals, find satisfying experience.    Discuss new patterns or behaviors to engage in for improved social functioning.    Behavioral Activation    Discuss steps patient can take to become more involved in meaningful activity.    Identify barriers to these activities and explore possible solutions.    Mindfulness-Based Strategies    Discuss skills based on development and application of  mindfulness.    Skills drawn from compassion-focused therapy, dialectical behavior therapy, mindfulness-based stress reduction, mindfulness-based cognitive therapy, etc.      We have developed these goals together during our work to this point. Patient has assisted in the development of these goals and has agreed to this treatment plan.       Anoop Renae, ABILIO  September 21, 2020

## 2020-10-14 ENCOUNTER — AMBULATORY - HEALTHEAST (OUTPATIENT)
Dept: ULTRASOUND IMAGING | Facility: CLINIC | Age: 35
End: 2020-10-14

## 2020-10-14 ENCOUNTER — VIRTUAL VISIT (OUTPATIENT)
Dept: GASTROENTEROLOGY | Facility: CLINIC | Age: 35
End: 2020-10-14
Attending: INTERNAL MEDICINE
Payer: COMMERCIAL

## 2020-10-14 DIAGNOSIS — Z11.59 ENCOUNTER FOR SCREENING FOR OTHER VIRAL DISEASES: ICD-10-CM

## 2020-10-14 DIAGNOSIS — K70.31 ALCOHOLIC CIRRHOSIS OF LIVER WITH ASCITES (H): Primary | ICD-10-CM

## 2020-10-14 PROCEDURE — 99213 OFFICE O/P EST LOW 20 MIN: CPT | Mod: 95 | Performed by: INTERNAL MEDICINE

## 2020-10-14 ASSESSMENT — PAIN SCALES - GENERAL: PAINLEVEL: NO PAIN (0)

## 2020-10-14 NOTE — LETTER
"    10/14/2020         RE: Crhisto Herrera  [address] [place] [address]        Dear Colleague,    Thank you for referring your patient, Christo Herrera, to the [facility] [place]. Please see a copy of my visit note below.    Christo Herrera is a 35 year old male who is being evaluated via a billable video visit.      The patient has been notified of following:     \"This video visit will be conducted via a call between you and your physician/provider. We have found that certain health care needs can be provided without the need for an in-person physical exam.  This service lets us provide the care you need with a video conversation.  If a prescription is necessary we can send it directly to your pharmacy.  If lab work is needed we can place an order for that and you can then stop by our lab to have the test done at a later time.    Video visits are billed at different rates depending on your insurance coverage.  Please reach out to your insurance provider with any questions.    If during the course of the call the physician/provider feels a video visit is not appropriate, you will not be charged for this service.\"    Patient has given verbal consent for Video visit? Yes  How would you like to obtain your AVS? MyChart  If you are dropped from the video visit, the video invite should be resent to: Send to e-mail at: [email or web address]  Will anyone else be joining your video visit? No        Video-Visit Details    Type of service:  Video Visit    Video Start Time: 0804  Video End Time: 0824    Originating Location (pt. Location): Home    Distant Location (provider location):  [facility] [place]     Platform used for Video Visit: MuteButton  GI CLINIC VISIT    CC/REFERRING PROVIDER:  Luda Caraballo  REASON FOR CONSULTATION: Cirrhosis    HPI:   35 year old male with medical history significant for alcohol use disorder, with " consequent development of alcoholic hepatitis and then alcohol-related cirrhosis, which is decompensated mainly due to ascites and hepatic encephalopathy, and also complicated by acute kidney injury for which he was previously on dialysis [stopped dialysis 08/2020] as well as small esophageal varices seen on his last upper endoscopy.  Today, he is doing well, denies any hematochezia, hematemesis, melena, fever or chills, chest pain, shortness of breath, abdominal pain, confusion.  He is compliant with lactulose, and has not had any problems with hepatic encephalopathy recently.  He stopped dialysis in August 2020, and his last labs showed a creatinine of 2.05.  He continues to eat well, staying away from excessive sodium, and eating a good amount of protein in his diet.  He also enjoys exercising, also tries to walk daily and jogs sometimes as well.  He is currently planned for a lab draw at 12:45 PM today.    His last upper endoscopy was in 1/20/2020 showing portal hypertensive gastropathy and small esophageal varices.  His last ultrasound scan was 7/15/2020 without any hepatocellular carcinoma found.    ROS: 10pt ROS performed and otherwise negative.    PERTINENT PAST MEDICAL/SURGICAL HISTORY:  Past Medical History:   Diagnosis Date     Alcoholic cirrhosis of liver with ascites (H) 3/10/2020     Ascites      ESRD (end stage renal disease) (H)      GERD (gastroesophageal reflux disease)      HE (hepatic encephalopathy) (H)      History of blood transfusion       PERTINENT MEDICATIONS:    Current Outpatient Medications:      gabapentin (NEURONTIN) 100 MG capsule, , Disp: , Rfl:      guaiFENesin (MUCINEX) 600 MG 12 hr tablet, Take 600 mg by mouth 2 times daily as needed for congestion or cough , Disp: , Rfl:      lactulose (CHRONULAC) 10 GM/15ML solution, Take 10 g by mouth daily as needed , Disp: , Rfl:      Menthol-Methyl Salicylate (ICY HOT EXTRA STRENGTH) 10-30 % CREA, , Disp: , Rfl:      midodrine (PROAMATINE) 5  MG tablet, Take 15 mg by mouth, Disp: , Rfl:      Multiple Vitamins-Minerals (SUPER THERA PRERNA M) TABS, Take 1 tablet by mouth, Disp: , Rfl:      pantoprazole (PROTONIX) 40 MG EC tablet, , Disp: , Rfl:      Psyllium 58.12 % PACK, Bid daily, Disp: , Rfl:      vitamin B1 (VITAMIN B-1) 100 MG tablet, Take 100 mg by mouth, Disp: , Rfl:      Cholecalciferol (VITAMIN D HIGH POTENCY) 25 MCG (1000 UT) CAPS, TAKE ONE CAPSULE BY MOUTH DAILY (Patient not taking: Reported on 10/14/2020), Disp: 30 capsule, Rfl: 3     ciprofloxacin (CIPRO) 250 MG tablet, Take one 250 mg tab every 24 hours AFTER dialysis for 7 days. (Patient not taking: Reported on 6/16/2020), Disp: 7 tablet, Rfl: 0     FEROSUL 325 (65 Fe) MG tablet, , Disp: , Rfl:      levETIRAcetam (KEPPRA) 500 MG tablet, , Disp: , Rfl:      Misc. Devices (ROLLATOR ULTRA-LIGHT) MISC, Walker with front wheels for home use., Disp: , Rfl:      OYSCO 500 + D 500-200 MG-UNIT tablet, TAKE 1 TABLET BY MOUTH TWICE DAILY WITH MEALS (Patient not taking: Reported on 10/14/2020), Disp: 60 tablet, Rfl: 3     torsemide (DEMADEX) 100 MG tablet, Take 100 mg by mouth daily, Disp: , Rfl:      PHYSICAL EXAMINATION:  Gen: Cooperative, pleasant, not in distress  HEENT: EOMI  Resp/CV Unlabored breathing  Abd: Distended  Skin: Jaundice  Neuro: Alert    PERTINENT STUDIES:  Last labs obtained 08/2020 around his last dialysis.    Sodium 138, creatinine 2.05, INR 1.3  Hemoglobin 9.7, platelets 135  AST 39, ALT 21, alkaline phosphatase 147, bilirubin 2.6.    MELD-Na 27    ASSESSMENT/PLAN:  35 year old male with medical history significant for alcohol use disorder, with consequent development of alcoholic hepatitis and then alcohol-related cirrhosis, which is decompensated mainly due to ascites and hepatic encephalopathy, and also complicated by acute kidney injury for which he was previously on dialysis [stopped dialysis 08/2020] as well as small esophageal varices seen on his last upper endoscopy.    1.  Alcohol related Cirrhosis:   Complicated by MARYBEL previously on HD  He has decompensated disease, based on the presence of ascites, hepatic encephalopathy  Etiology: Alcohol, now sober ~ 1 year  MELD-Na of  27   Hepatic encephalopathy: None  Ascites: Present  SBP prophylaxis: None  TIPS: None  Esophageal/Gastric varices: Last EGD was done 1/2020 with small esophageal varices and PHG  Hepatocellular carcinoma: Last USS was done 7/2020 with no HCC  Transplant: Listed (Committee decision on 8/25/2020) for [facility]          Blood type O+  RECOMMENDATIONS:  -- Continue Lactulose PO, titrate to 3-4 BMs per day  -- Monitor neurologic status, high risk of progression  -- Obtain ultrasound with dopplers in 3 months  -- Continue therapeutic paracentesis   -- Ensure sodium restriction to 2000 mg per day  -- Continue Torsemide as directed  -- Continue regular exercise  -- Adequate protein diet (1.2 - 1.5g/kg/day)   - Recommend multiple meals during the day, Snacks and shakes during the day/night   - Can use Ensure/Boost drinks TID     RTC 3 months with labs, sooner if symptomatic.      The visit lasted up to 20 minutes, with more than half of the time spent on counseling and education.  All questions were answered to patient's satisfaction    Patient seen and discussed with staff GI physician, Dr. Echavarria, who agrees with my assessment and plan.      Blair Triplett MD  Transplant Hepatology fellow  PGY 6  [phone]     Blair Triplett MD    Attestation:  This patient has been seen and evaluated by me, Brandin Echavarria.  Discussed with the house staff team or resident(s) and agree with the findings and plan in this note.         Again, thank you for allowing me to participate in the care of your patient.        Sincerely,        Brandin Echavarria MD

## 2020-10-14 NOTE — PROGRESS NOTES
"Christo Herrera is a 35 year old male who is being evaluated via a billable video visit.      The patient has been notified of following:     \"This video visit will be conducted via a call between you and your physician/provider. We have found that certain health care needs can be provided without the need for an in-person physical exam.  This service lets us provide the care you need with a video conversation.  If a prescription is necessary we can send it directly to your pharmacy.  If lab work is needed we can place an order for that and you can then stop by our lab to have the test done at a later time.    Video visits are billed at different rates depending on your insurance coverage.  Please reach out to your insurance provider with any questions.    If during the course of the call the physician/provider feels a video visit is not appropriate, you will not be charged for this service.\"    Patient has given verbal consent for Video visit? Yes  How would you like to obtain your AVS? MyChart  If you are dropped from the video visit, the video invite should be resent to: Send to e-mail at: [email or web address]  Will anyone else be joining your video visit? No        Video-Visit Details    Type of service:  Video Visit    Video Start Time: 0804  Video End Time: 0824    Originating Location (pt. Location): Home    Distant Location (provider location):  [facility] [place]     Platform used for Video Visit: [employer]  GI CLINIC VISIT    CC/REFERRING PROVIDER:  Luda Caraballo  REASON FOR CONSULTATION: Cirrhosis    HPI:   35 year old male with medical history significant for alcohol use disorder, with consequent development of alcoholic hepatitis and then alcohol-related cirrhosis, which is decompensated mainly due to ascites and hepatic encephalopathy, and also complicated by acute kidney injury for which he was previously on dialysis [stopped dialysis 08/2020] as well as small " esophageal varices seen on his last upper endoscopy.  Today, he is doing well, denies any hematochezia, hematemesis, melena, fever or chills, chest pain, shortness of breath, abdominal pain, confusion.  He is compliant with lactulose, and has not had any problems with hepatic encephalopathy recently.  He stopped dialysis in August 2020, and his last labs showed a creatinine of 2.05.  He continues to eat well, staying away from excessive sodium, and eating a good amount of protein in his diet.  He also enjoys exercising, also tries to walk daily and jogs sometimes as well.  He is currently planned for a lab draw at 12:45 PM today.    His last upper endoscopy was in 1/20/2020 showing portal hypertensive gastropathy and small esophageal varices.  His last ultrasound scan was 7/15/2020 without any hepatocellular carcinoma found.    ROS: 10pt ROS performed and otherwise negative.    PERTINENT PAST MEDICAL/SURGICAL HISTORY:  Past Medical History:   Diagnosis Date     Alcoholic cirrhosis of liver with ascites (H) 3/10/2020     Ascites      ESRD (end stage renal disease) (H)      GERD (gastroesophageal reflux disease)      HE (hepatic encephalopathy) (H)      History of blood transfusion       PERTINENT MEDICATIONS:    Current Outpatient Medications:      gabapentin (NEURONTIN) 100 MG capsule, , Disp: , Rfl:      guaiFENesin (MUCINEX) 600 MG 12 hr tablet, Take 600 mg by mouth 2 times daily as needed for congestion or cough , Disp: , Rfl:      lactulose (CHRONULAC) 10 GM/15ML solution, Take 10 g by mouth daily as needed , Disp: , Rfl:      Menthol-Methyl Salicylate (ICY HOT EXTRA STRENGTH) 10-30 % CREA, , Disp: , Rfl:      midodrine (PROAMATINE) 5 MG tablet, Take 15 mg by mouth, Disp: , Rfl:      Multiple Vitamins-Minerals (SUPER THERA PRERNA M) TABS, Take 1 tablet by mouth, Disp: , Rfl:      pantoprazole (PROTONIX) 40 MG EC tablet, , Disp: , Rfl:      Psyllium 58.12 % PACK, Bid daily, Disp: , Rfl:      vitamin B1 (VITAMIN  B-1) 100 MG tablet, Take 100 mg by mouth, Disp: , Rfl:      Cholecalciferol (VITAMIN D HIGH POTENCY) 25 MCG (1000 UT) CAPS, TAKE ONE CAPSULE BY MOUTH DAILY (Patient not taking: Reported on 10/14/2020), Disp: 30 capsule, Rfl: 3     ciprofloxacin (CIPRO) 250 MG tablet, Take one 250 mg tab every 24 hours AFTER dialysis for 7 days. (Patient not taking: Reported on 6/16/2020), Disp: 7 tablet, Rfl: 0     FEROSUL 325 (65 Fe) MG tablet, , Disp: , Rfl:      levETIRAcetam (KEPPRA) 500 MG tablet, , Disp: , Rfl:      Misc. Devices (ROLLATOR ULTRA-LIGHT) MISC, Walker with front wheels for home use., Disp: , Rfl:      OYSCO 500 + D 500-200 MG-UNIT tablet, TAKE 1 TABLET BY MOUTH TWICE DAILY WITH MEALS (Patient not taking: Reported on 10/14/2020), Disp: 60 tablet, Rfl: 3     torsemide (DEMADEX) 100 MG tablet, Take 100 mg by mouth daily, Disp: , Rfl:      PHYSICAL EXAMINATION:  Gen: Cooperative, pleasant, not in distress  HEENT: EOMI  Resp/CV Unlabored breathing  Abd: Distended  Skin: Jaundice  Neuro: Alert    PERTINENT STUDIES:  Last labs obtained 08/2020 around his last dialysis.    Sodium 138, creatinine 2.05, INR 1.3  Hemoglobin 9.7, platelets 135  AST 39, ALT 21, alkaline phosphatase 147, bilirubin 2.6.    MELD-Na 27    ASSESSMENT/PLAN:  35 year old male with medical history significant for alcohol use disorder, with consequent development of alcoholic hepatitis and then alcohol-related cirrhosis, which is decompensated mainly due to ascites and hepatic encephalopathy, and also complicated by acute kidney injury for which he was previously on dialysis [stopped dialysis 08/2020] as well as small esophageal varices seen on his last upper endoscopy.    1. Alcohol related Cirrhosis:   Complicated by MARYBEL previously on HD  He has decompensated disease, based on the presence of ascites, hepatic encephalopathy  Etiology: Alcohol, now sober ~ 1 year  MELD-Na of  27   Hepatic encephalopathy: None  Ascites: Present  SBP prophylaxis:  None  TIPS: None  Esophageal/Gastric varices: Last EGD was done 1/2020 with small esophageal varices and PHG  Hepatocellular carcinoma: Last USS was done 7/2020 with no HCC  Transplant: Listed (Committee decision on 8/25/2020) for [facility]          Blood type O+  RECOMMENDATIONS:  -- Continue Lactulose PO, titrate to 3-4 BMs per day  -- Monitor neurologic status, high risk of progression  -- Obtain ultrasound with dopplers in 3 months  -- Continue therapeutic paracentesis   -- Ensure sodium restriction to 2000 mg per day  -- Continue Torsemide as directed  -- Continue regular exercise  -- Adequate protein diet (1.2 - 1.5g/kg/day)   - Recommend multiple meals during the day, Snacks and shakes during the day/night   - Can use Ensure/Boost drinks TID     RTC 3 months with labs, sooner if symptomatic.      The visit lasted up to 20 minutes, with more than half of the time spent on counseling and education.  All questions were answered to patient's satisfaction    Patient seen and discussed with staff GI physician, Dr. Echavarria, who agrees with my assessment and plan.      Blair Triplett MD  Transplant Hepatology fellow  PGY 6  [phone]     Blair Triplett MD    Attestation:  This patient has been seen and evaluated by me, Brandin Echavarria.  Discussed with the house staff team or resident(s) and agree with the findings and plan in this note.

## 2020-10-19 ENCOUNTER — TELEPHONE (OUTPATIENT)
Dept: TRANSPLANT | Facility: CLINIC | Age: 35
End: 2020-10-19

## 2020-10-19 NOTE — TELEPHONE ENCOUNTER
Liver Transplant Evaluation/Teaching    TEACHING TOPICS: Evaluation Process, Evaluation Items, Diagnostic Studies, Consultation, Chemical Dependency Policy, CD Eval, Donor Source, Liver Allocation, MELD System, UNOS, Waiting List, Follow up while on transplant list, Follow up after transplantation, Infection and Rejection, Immunosuppression , Medication Teaching, Lab Recording after transplant, Laboratory Frequency after transplant , Consent for evaluation and One year survival rates  INSTRUCTIONAL MATERIAL USED/GIVEN: Liver Transplant Handbook, MELD Booklet, Donor Booklet, Web Sites Options, Verbal instructions, Multiple Listing Brochure , Consent for evaluation signed, One year survival rates and SRTR (Scientific Registry) Data  Person(s) involved in teaching: patient, but father has had previous teaching as well.  Asks Questions: YES  Eager to Learn: YES  Cooperative: YES  Receptive (willing/able to accept information): YES  Reason for the appointment, diagnosis and treatment plan:YES  Knowledge of proper use of medications and conditions for which they are ordered (with special attention to potential side effects or drug interactions): YES  Which situations necessitate calling provider and whom to contact:YES  Other: ER for GIB, making sure to schedule/do taps as needed  Teaching Concerns Addressed   Comments: Big 5: MD compliance, med compliance, lab compliance, eating right & exercise, no alcohol or non-prescribed meds/drugs before or after transplant.   Proper use and care of (medical equip, care aids, etc.):n/a  Nutritional needs and diet plan: YES  Pain management techniques: n/a  Wound Care: n/a  How and/when to access community resources: YES  Patient is aware of and agrees to required commitment to post-op care and long term follow-up: YES  Patient has name and phone number of transplant coordinator.   Time Spent face-to-face teachin minutes.    Christo Alcala Jr., BSN, RN  Liver Transplant  Coordinator  [phone]

## 2020-10-20 ENCOUNTER — DOCUMENTATION ONLY (OUTPATIENT)
Dept: TRANSPLANT | Facility: CLINIC | Age: 35
End: 2020-10-20

## 2020-10-20 NOTE — LETTER
October 20, 2020    Christo Herrera  [address] [place] [address]    Dear Mr. Herrera,    This letter is sent to confirm that you have completed your transplant work-up and you are a candidate in the liver transplant program at the [facility].  You were placed on the liver active waitlist on 10/19/20.      When you are active on the waitlist and an organ becomes available, a coordinator will need to speak to you immediately.  You could be contacted at any time during the day or night as an organ could become available at any time.  Please make certain our office always has your current telephone numbers and address.      Items we will need from you:      We have received approval from your insurance company for the transplant procedure.  It is critical that you notify us if there is any change in your insurance.  It is also important that you familiarize yourself with the details of your specific insurance policy.  Our patient  is available to assist you if you should have any questions regarding your coverage.      During this waiting period, we may request additional periodic laboratory tests with your primary physician.  It will be your responsibility to remind your physician to forward your results to the Transplant Office.      We need to be kept informed of any changes in your medical condition such as:    o changes in your medications,   o significant changes in your health  o significant infections (such as pneumonia or abscesses)  o blood transfusions  o any condition which requires hospitalization  o any surgery      Remember to complete any routine cancer screening tests required before your transplant.  This includes colonoscopy; prostate screening for men, and mammogram and gynecologic testing for women, as well as dental work.  Your primary care clinic can assist you with arranging for these exams.  Remind your caregivers to  forward copies of the records and final reports.    We want you to know that our program has physician and surgeon coverage 24 hours a day, 365 days a year. If this coverage changes or there are substantial program changes, you will be notified in writing by letter.     Attached is a letter from the United Network for Organ Sharing (UNOS). It describes the services and information offered to patients by UNOS and the Organ Procurement and Transplantation Network.    We appreciate having had the opportunity to participate in your care.  If you have questions, please feel free to call the Transplant Office at [phone] or [phone].    Sincerely,    Christo Alcala Jr., LAVELLEN, RN  Liver Transplant Coordinator  [phone]    Solid Organ Transplant  [facility]    Enclosures: DONNIE Letter  cc: Care Team                              The Organ Procurement and Transplantation Network  Toll-free patient services line:     Your resource for organ transplant information    If you have a question regarding your own medical care, you always should call your transplant hospital first. However, for general organ transplant-related information, you can call the Organ Procurement and Transplantation Network (OPTN) toll-free patient services line at [phone]- [phone]. Anyone, including potential transplant candidates, candidates, recipients, family members, friends, living donors, and donor family members, can call this number to:          Talk about organ donation, living donation, the transplant process, the donation process, and transplant policies.    Get a free patient information kit with helpful booklets, waiting list and transplant information, and a list of all transplant hospitals.    Ask questions about the OPTN website (https://optn.transplant.hrsa.gov/), the United Network for Organ Sharing s (UNOS) website (https://unos.org/), or the UNOS  website for living donors and transplant recipients. (https://www.transplantliving.org/).    Learn how the OPTN can help you.    Talk about any concerns that you may have with a transplant hospital.    The [facility] s transplant system, the OPTN, is managed under federal contract by the United Network for Organ Sharing (UNOS), which is a non-profit charitable organization. The OPTN helps create and define organ sharing policies that make the best use of donated organs. This process continuously evaluating new advances and discoveries so policies can be adapted to best serve patients waiting for transplants. To do so, the OPTN works closely with transplant professionals, transplant patients, transplant candidates, donor families, living donors, and the public. All transplant programs and organ procurement organizations throughout the country are OPTN members and are obligated to follow the policies the OPTN creates for allocating organs.    The OPTN also is responsible for:      Providing educational material for patients, the public, and professionals.    Raising awareness of the need for donated organs and tissue.    Coordinating organ procurement, matching, and placement.    Collecting information about every organ transplant and donation that occurs in the [place].    Remember, you should contact your transplant hospital directly if you have questions or concerns about your own medical care including medical records, work-up progress, and test results.    We are not your transplant hospital, and our staff will not be able to answer questions about your case, so please keep your transplant hospital s phone number handy.            However, while you research your transplant needs and learn as much as you can about transplantation and donation, we welcome your call to our toll-free patient services line at [phone].          Updated 4/1/2019

## 2020-10-20 NOTE — PHARMACY-MEDICATION REGIMEN REVIEW
Pharmacy Liver Pre-Transplant Medication Evaluation    This patient is a 35 year old male being considered for liver transplantation.   As part of the liver pre-transplant patient evaluation, pharmacy has screened this patient s electronic medical record for medication related concerns.     Assessment/Plan:  No significant potential medication related issues are expected for this patient post-transplant, based on the medical record medication list review.  Pharmacy will continue to participate in this patient's care throughout the transplant course. Please contact pharmacy with any further medication related questions or concerns.     NESS Murphy.Ph.  [facility]  [phone] pager

## 2020-10-20 NOTE — PROGRESS NOTES
New liver listing    ###   Please note that I did NOT list for kidney at this time. Patient reported he has been off dialysis for over a month and his last Cr was dropping and currently at 1.6.  Per Dr. Chandler in August 2020, he is approved for [facility] if HD dependent. ###    ETOH cirrhosis  ABO O  MELD 17

## 2020-10-26 ENCOUNTER — VIRTUAL VISIT (OUTPATIENT)
Dept: BEHAVIORAL HEALTH | Facility: CLINIC | Age: 35
End: 2020-10-26
Payer: COMMERCIAL

## 2020-10-26 DIAGNOSIS — F43.20 ADJUSTMENT DISORDER, UNSPECIFIED TYPE: Primary | ICD-10-CM

## 2020-10-26 DIAGNOSIS — F10.21 ALCOHOL USE DISORDER, SEVERE, IN EARLY REMISSION (H): ICD-10-CM

## 2020-10-26 PROCEDURE — 90834 PSYTX W PT 45 MINUTES: CPT | Mod: 95 | Performed by: PSYCHOLOGIST

## 2020-10-26 NOTE — PROGRESS NOTES
[facility]: Integrated Behavioral Health  October 26, 2020      Behavioral Health Clinician Progress Note    Patient Name: Christo Herrera           Service Type: Video visit over [employer]      Service Location:  virtual      Session Start Time: 10:02  Session End Time: 10:48      Session Length: 38 - 52      Attendees: Patient    Visit Activities (Refresh list every visit): [facility] [place] Only    Telemedicine Visit: The patient's condition can be safely assessed and treated via synchronous audio and visual telemedicine encounter.      Reason for Telemedicine Visit: COVID-19    Originating Site (Patient Location): Patient's home    Distant Site (Provider Location): Provider Remote Setting    Consent:  The patient/guardian has verbally consented to: the potential risks and benefits of telemedicine (video visit) versus in person care; bill my insurance or make self-payment for services provided; and responsibility for payment of non-covered services.     Mode of Communication:  Video Conference via [employer]    As the provider I attest to compliance with applicable laws and regulations related to telemedicine.      Diagnostic Assessment Date: 7/20/20  Treatment Plan Review Date: 12/21/2020  See Flowsheets for today's PHQ-9 and BERTA-7 results  Previous PHQ-9:   PHQ-9 SCORE 7/20/2020   PHQ-9 Total Score MyChart 2 (Minimal depression)   PHQ-9 Total Score 2     Previous BERTA-7:   BERTA-7 SCORE 7/20/2020   Total Score 1 (minimal anxiety)   Total Score 1       CHONG LEVEL:  No flowsheet data found.    DATA  Extended Session (60+ minutes): No  Interactive Complexity: No  Crisis: No    Treatment Objective(s) Addressed in This Session:  Target Behavior(s): disease management/lifestyle changes related to maintaining sobriety from alcohol and relationship concerns    Adjustment Difficulties: will develop coping/problem-solving skills to facilitate more adaptive adjustment  Relationship Problems: will address  relationship difficulties in a more adaptive manner  Alcohol / Substance Use: will increase understanding of the effects of substance use  continue to make healthy choices regarding substance use and engage in activities / supportive services that promote sobriety    Current Stressors / Issues:  Christo notes some positive interactions with his father and brother over the last week. He cites that his brother ran into a conflict with his other brothers and this started some more in-depth conversations he had with them. Christo notes he has found him and his father and brother have grown closer because of these more intimate conversations. Christo notes he had reflected on his part in the relationships, noting how he could at times discount his family's efforts in helping him and that by taking time to recognize and express gratitude toward them seemed to yield positive results. We explored what goals he has for his relationships in the future, namely what he hopes to continue seeing/doing in his family relationships to be more fulfilling and supportive.     Christo did note his anxiety has been increased lately, though he is coping well with using deep breathing and some cognitive exercises to think about concerns differently. He did inquire if there were additional stratgies he could use. Provided Christo information about use of thought logs, worry-time, and delayed-worry as strategies he could use to continue reducing his worrisome thoughts and anxious distress. Provided Christo examples of how to use these techniques. Christo noted these could be helpful for him.      Progress on Treatment Objective(s) / Homework:  Satisfactory progress - MAINTENANCE (Working to maintain change, with risk of relapse); Intervened by continuing to positively reinforce healthy behavior choice     Motivational Interviewing    MI Intervention: Co-Developed Goal: maintain sobriety, Expressed Empathy/Understanding, Supported Autonomy,  Collaboration, Evocation, Permission to raise concern or advise, Open-ended questions, Reflections: simple and complex and Change talk (evoked)     Change Talk Expressed by the Patient: Committment to change Activation    Provider Response to Change Talk: E - Evoked more info from patient about behavior change, A - Affirmed patient's thoughts, decisions, or attempts at behavior change, R - Reflected patient's change talk and S - Summarized patient's change talk statements    Supportive maintenance counseling    Worry logs, delayed worry, worry time as HW assignments to help with anxious distress    Care Plan review completed: no    Medication Review:  No current psychiatric medications prescribed    Medication Compliance:  NA    Changes in Health Issues:   None reported    Chemical Use Review:   Substance Use: Chemical use reviewed, no active concerns identified      Tobacco Use: No current tobacco use.      Assessment: Current Emotional / Mental Status (status of significant symptoms):  Risk status (Self / Other harm or suicidal ideation)  Patient denies a history of suicidal ideation, suicide attempts, self-injurious behavior, homicidal ideation, homicidal behavior and and other safety concerns     Patient denies current fears or concerns for personal safety.  Patient denies current or recent suicidal ideation or behaviors.  Patient denies current or recent homicidal ideation or behaviors.  Patient denies current or recent self injurious behavior or ideation.  Patient denies other safety concerns.     A safety and risk management plan has not been developed at this time, however patient was encouraged to call [place] [phone] should there be a change in any of these risk factors.    Appearance:   Appropriate   Eye Contact:   Good   Psychomotor Behavior: Normal   Attitude:   Cooperative   Orientation:   All  Speech   Rate / Production: Normal    Volume:  Normal   Mood:    Normal  Affect:    Appropriate   Thought  Content:  Clear   Thought Form:  Coherent  Logical   Insight:    Good      Diagnoses:  1. Adjustment disorder, unspecified type    2. Alcohol use disorder, severe, in early remission (H)        Collateral Reports Completed:  Not Applicable    Plan: (Homework, other):  Christo is scheduled to continue [facility] services for mild adjustment related distress and relationship concerns pertaining to transplant and help maintaining sobriety in 2 weeks. No immediate safety or CD issues were identified.     Anoop Renae LP  10/26/2020    ______________________________________________________________________    [facility] Integrated Behavioral Health Treatment Plan    Client's Name: Christo Herrera  YOB: 1985    Date: 9/20/2020    DSM-V Diagnoses: Adjustment Disorders  309.9 (F43.20) Unspecified; Substance-Related & Addictive Disorders Alcohol Use Disorder   303.90 (F10.20) Severe In early remission, ;  WHODAS: 25    Clinical Global Impressions  First:  Considering your total clinical experience with this particular patient population, how severe are the patient's symptoms at this time?: 2 (9/22/2020  2:43 PM)      Most recent:  Compared to the patient's condition at the START of treatment, this patient's condition is: 2 (9/22/2020  2:43 PM)        Referral / Collaboration:  Will collaborate with care team as indicated during treatment.    Anticipated number of session or this episode of care: as needed; minimum 6      MeasurableTreatment Goal(s) related to diagnosis / functional impairment(s)    Goal 1:  Patient will identify and increase engagement in valued activity, i.e. improving social connections/relationships, pursuing occupational goals or personally meaningful pursuits, exploration of meaning in life.     Objective #A: Patient will identify meaningful activity in social, occupational and  personal goals, and increase behavioral activation around these goals   Status: Continued - Date(s): 9/20/2020    Objective  #B: Patient will address relationship difficulties in a more adaptive manner  Status: Continued - Date(s): 9/20/2020    Objective #C: Patient will develop coping/problem-solving skills to facilitate more adaptive adjustment and will effectively address problems that interfere with adaptive functioning  Status: Continued - Date(s): 9/20/20    Goal 2:  Patient will maintain sobriety from all mood-altering substances    Objective #A: Patient will increase understanding of the effects of substance use   Status: Continued - Date(s): Completed    Objective #B: Patient will develop more effective coping skills to manage anxiety symptoms and increase understanding of triggers  Status: Continued - Date(s): 9/20/20    Objective #C: Patient continue to make healthy choices regarding substance use and engage in activities / supportive services that promote sobriety  Status: Continued - Date(s): 9/20/2020    Possible Therapeutic Intervention(s)  Psycho-education regarding mental health diagnoses and treatment options      Skills training    Explore skills useful to client in current situation.    Skills include assertiveness, communication, conflict management, problem-solving, relaxation, etc.    Solution-Focused Therapy    Explore patterns in patient's relationships and discuss options for new behaviors.    Explore patterns in patient's actions and choices and discuss options for new behaviors.    Cognitive-behavioral Therapy    Discuss common cognitive distortions, identify them in patient's life.    Explore ways to challenge, replace, and act against these cognitions.    Acceptance and Commitment Therapy    Explore and identify important values in patient's life.    Discuss ways to commit to behavioral activation around these values.    Psychodynamic psychotherapy    Discuss patient's emotional dynamics and issues and how they impact behaviors.    Explore patient's history of relationships and how they impact present  behaviors.    Explore how to work with and make changes in these schemas and patterns.    Narrative Therapy    Explore the patient's story of his/her life from his/her perspective.    Explore alternate ways of understanding their experience, identifying exceptions, developing new themes.    Interpersonal Psychotherapy    Explore patterns in relationships that are effective or ineffective at helping patient reach their goals, find satisfying experience.    Discuss new patterns or behaviors to engage in for improved social functioning.    Behavioral Activation    Discuss steps patient can take to become more involved in meaningful activity.    Identify barriers to these activities and explore possible solutions.    Mindfulness-Based Strategies    Discuss skills based on development and application of mindfulness.    Skills drawn from compassion-focused therapy, dialectical behavior therapy, mindfulness-based stress reduction, mindfulness-based cognitive therapy, etc.      We have developed these goals together during our work to this point. Patient has assisted in the development of these goals and has agreed to this treatment plan.       Anoop Renae LP  September 21, 2020

## 2020-10-31 ENCOUNTER — AMBULATORY - HEALTHEAST (OUTPATIENT)
Dept: FAMILY MEDICINE | Facility: CLINIC | Age: 35
End: 2020-10-31

## 2020-10-31 DIAGNOSIS — Z11.59 ENCOUNTER FOR SCREENING FOR OTHER VIRAL DISEASES: ICD-10-CM

## 2020-11-01 LAB
SARS-COV-2 PCR COMMENT: NORMAL
SARS-COV-2 RNA SPEC QL NAA+PROBE: NEGATIVE
SARS-COV-2 VIRUS SPECIMEN SOURCE: NORMAL

## 2020-11-02 ENCOUNTER — COMMUNICATION - HEALTHEAST (OUTPATIENT)
Dept: SCHEDULING | Facility: CLINIC | Age: 35
End: 2020-11-02

## 2020-11-03 ENCOUNTER — HOSPITAL ENCOUNTER (OUTPATIENT)
Dept: ULTRASOUND IMAGING | Facility: CLINIC | Age: 35
Discharge: HOME | End: 2020-11-03
Attending: INTERNAL MEDICINE

## 2020-11-03 ENCOUNTER — COMMUNICATION - HEALTHEAST (OUTPATIENT)
Dept: TELEHEALTH | Facility: CLINIC | Age: 35
End: 2020-11-03

## 2020-11-03 DIAGNOSIS — R18.8 OTHER ASCITES: ICD-10-CM

## 2020-11-04 ENCOUNTER — AMBULATORY - HEALTHEAST (OUTPATIENT)
Dept: ULTRASOUND IMAGING | Facility: CLINIC | Age: 35
End: 2020-11-04

## 2020-11-04 DIAGNOSIS — Z11.59 ENCOUNTER FOR SCREENING FOR OTHER VIRAL DISEASES: ICD-10-CM

## 2020-11-11 ENCOUNTER — HOSPITAL ENCOUNTER (OUTPATIENT)
Dept: ULTRASOUND IMAGING | Facility: CLINIC | Age: 35
Discharge: HOME | End: 2020-11-11
Attending: INTERNAL MEDICINE | Admitting: RADIOLOGY

## 2020-11-11 DIAGNOSIS — R18.8 OTHER ASCITES: ICD-10-CM

## 2020-11-12 ENCOUNTER — AMBULATORY - HEALTHEAST (OUTPATIENT)
Dept: ULTRASOUND IMAGING | Facility: CLINIC | Age: 35
End: 2020-11-12

## 2020-11-12 DIAGNOSIS — Z11.59 ENCOUNTER FOR SCREENING FOR OTHER VIRAL DISEASES: ICD-10-CM

## 2020-11-21 ENCOUNTER — AMBULATORY - HEALTHEAST (OUTPATIENT)
Dept: FAMILY MEDICINE | Facility: CLINIC | Age: 35
End: 2020-11-21

## 2020-11-21 DIAGNOSIS — Z11.59 ENCOUNTER FOR SCREENING FOR OTHER VIRAL DISEASES: ICD-10-CM

## 2020-11-23 ENCOUNTER — COMMUNICATION - HEALTHEAST (OUTPATIENT)
Dept: SCHEDULING | Facility: CLINIC | Age: 35
End: 2020-11-23

## 2020-11-24 ENCOUNTER — AMBULATORY - HEALTHEAST (OUTPATIENT)
Dept: SCHEDULING | Facility: CLINIC | Age: 35
End: 2020-11-24

## 2020-11-24 ENCOUNTER — HOSPITAL ENCOUNTER (OUTPATIENT)
Dept: ULTRASOUND IMAGING | Facility: CLINIC | Age: 35
Discharge: HOME | End: 2020-11-24
Attending: INTERNAL MEDICINE | Admitting: RADIOLOGY

## 2020-11-24 DIAGNOSIS — R18.8 OTHER ASCITES: ICD-10-CM

## 2020-11-25 ENCOUNTER — AMBULATORY - HEALTHEAST (OUTPATIENT)
Dept: ULTRASOUND IMAGING | Facility: CLINIC | Age: 35
End: 2020-11-25

## 2020-11-25 DIAGNOSIS — Z11.59 ENCOUNTER FOR SCREENING FOR OTHER VIRAL DISEASES: ICD-10-CM

## 2020-12-03 ENCOUNTER — HOSPITAL ENCOUNTER (OUTPATIENT)
Dept: ULTRASOUND IMAGING | Facility: CLINIC | Age: 35
Discharge: HOME | End: 2020-12-03
Attending: INTERNAL MEDICINE | Admitting: RADIOLOGY

## 2020-12-03 DIAGNOSIS — R18.8 OTHER ASCITES: ICD-10-CM

## 2020-12-04 ENCOUNTER — AMBULATORY - HEALTHEAST (OUTPATIENT)
Dept: ULTRASOUND IMAGING | Facility: HOSPITAL | Age: 35
End: 2020-12-04

## 2020-12-04 DIAGNOSIS — Z11.59 ENCOUNTER FOR SCREENING FOR OTHER VIRAL DISEASES: ICD-10-CM

## 2020-12-08 ENCOUNTER — AMBULATORY - HEALTHEAST (OUTPATIENT)
Dept: LAB | Facility: CLINIC | Age: 35
End: 2020-12-08

## 2020-12-08 DIAGNOSIS — Z11.59 ENCOUNTER FOR SCREENING FOR OTHER VIRAL DISEASES: ICD-10-CM

## 2020-12-09 ENCOUNTER — COMMUNICATION - HEALTHEAST (OUTPATIENT)
Dept: SCHEDULING | Facility: CLINIC | Age: 35
End: 2020-12-09

## 2020-12-11 ENCOUNTER — HOSPITAL ENCOUNTER (OUTPATIENT)
Dept: ULTRASOUND IMAGING | Facility: CLINIC | Age: 35
Discharge: HOME | End: 2020-12-11
Attending: INTERNAL MEDICINE | Admitting: RADIOLOGY

## 2020-12-11 DIAGNOSIS — R18.8 OTHER ASCITES: ICD-10-CM

## 2020-12-18 ENCOUNTER — HOSPITAL ENCOUNTER (OUTPATIENT)
Dept: ULTRASOUND IMAGING | Facility: CLINIC | Age: 35
Discharge: HOME | End: 2020-12-18
Attending: INTERNAL MEDICINE | Admitting: RADIOLOGY

## 2020-12-18 DIAGNOSIS — R18.8 OTHER ASCITES: ICD-10-CM

## 2020-12-21 ENCOUNTER — AMBULATORY - HEALTHEAST (OUTPATIENT)
Dept: ULTRASOUND IMAGING | Facility: CLINIC | Age: 35
End: 2020-12-21

## 2020-12-21 DIAGNOSIS — Z11.59 ENCOUNTER FOR SCREENING FOR OTHER VIRAL DISEASES: ICD-10-CM

## 2020-12-26 ENCOUNTER — AMBULATORY - HEALTHEAST (OUTPATIENT)
Dept: FAMILY MEDICINE | Facility: CLINIC | Age: 35
End: 2020-12-26

## 2020-12-26 DIAGNOSIS — Z11.59 ENCOUNTER FOR SCREENING FOR OTHER VIRAL DISEASES: ICD-10-CM

## 2020-12-28 ENCOUNTER — COMMUNICATION - HEALTHEAST (OUTPATIENT)
Dept: SCHEDULING | Facility: CLINIC | Age: 35
End: 2020-12-28

## 2020-12-30 ENCOUNTER — AMBULATORY - HEALTHEAST (OUTPATIENT)
Dept: ULTRASOUND IMAGING | Facility: CLINIC | Age: 35
End: 2020-12-30

## 2020-12-30 ENCOUNTER — HOSPITAL ENCOUNTER (OUTPATIENT)
Dept: ULTRASOUND IMAGING | Facility: CLINIC | Age: 35
Discharge: HOME | End: 2020-12-30
Attending: INTERNAL MEDICINE | Admitting: RADIOLOGY

## 2020-12-30 ENCOUNTER — HOSPITAL ENCOUNTER (OUTPATIENT)
Dept: ULTRASOUND IMAGING | Facility: CLINIC | Age: 35
Discharge: HOME | End: 2020-12-30
Attending: INTERNAL MEDICINE

## 2020-12-30 DIAGNOSIS — Z11.59 ENCOUNTER FOR SCREENING FOR OTHER VIRAL DISEASES: ICD-10-CM

## 2020-12-30 DIAGNOSIS — R18.8 OTHER ASCITES: ICD-10-CM

## 2021-01-04 ENCOUNTER — VIRTUAL VISIT (OUTPATIENT)
Dept: GASTROENTEROLOGY | Facility: CLINIC | Age: 36
End: 2021-01-04
Attending: INTERNAL MEDICINE
Payer: COMMERCIAL

## 2021-01-04 ENCOUNTER — HEALTH MAINTENANCE LETTER (OUTPATIENT)
Age: 36
End: 2021-01-04

## 2021-01-04 VITALS
DIASTOLIC BLOOD PRESSURE: 90 MMHG | HEART RATE: 82 BPM | WEIGHT: 137.4 LBS | BODY MASS INDEX: 21.52 KG/M2 | SYSTOLIC BLOOD PRESSURE: 116 MMHG

## 2021-01-04 DIAGNOSIS — K70.10 ACUTE ALCOHOLIC LIVER DISEASE (H): Primary | ICD-10-CM

## 2021-01-04 DIAGNOSIS — K76.6 PORTAL HYPERTENSION (H): ICD-10-CM

## 2021-01-04 PROCEDURE — 99213 OFFICE O/P EST LOW 20 MIN: CPT | Mod: 95 | Performed by: INTERNAL MEDICINE

## 2021-01-04 ASSESSMENT — PAIN SCALES - GENERAL: PAINLEVEL: NO PAIN (0)

## 2021-01-04 NOTE — LETTER
1/4/2021      RE: Christo Herrera  [address] [place] [address]       Christo Herrera is a 35 year old male who is being evaluated via a billable video visit.      How would you like to obtain your AVS? MyChart  If the video visit is dropped, the invitation should be resent by: Text to cell phone: [phone]  Will anyone else be joining your video visit? No      Video Start Time: 9:08 AM.    [facility]    Hepatology follow-up    CHIEF COMPLAINT AND REASON FOR VISIT:  Alcoholic liver disease.      CONSULTING HEALTHCARE PROFESSIONAL:  Luda Caraballo MD      SUBJECTIVE:  Mr. Herrera is a 35-year-old male with history of alcohol use disorder with alcoholic hepatitis and alcohol-related cirrhosis.  He was decompensated, mostly related with ascites and hepatic encephalopathy.  He also had some acute kidney injury and went on dialysis from 08/2020 and he also had esophageal varices.  He did relatively well since I last saw him.  He has no edema, but still has abdominal distention and is having every 10 days paracentesis.  He has no significant abdominal pain, nausea or vomiting.  He is moving his bowels on a regular basis, like 3 times a day.  He does not have any confusion, although historically he had, and has no memory issues.  He claims to be lethargic.  Otherwise, he tells me that he does not have any gastrointestinal bleeding like melena, hematemesis or hematochezia.  He also denies any fever, chills and he did get tested for COVID and this was a PCR, and he tended to be negative on 12/26.  He had also prior to that tested on the 8th with PCR and he was negative and still had also on 11/21 and he was negative.  He is telling me that he had historically tested with antibody and the antibody testing was positive, although the PCR, which was done later on multiple times, was negative.      Medical hx Surgical hx   Past Medical History:   Diagnosis Date      Alcoholic cirrhosis of liver with ascites (H) 3/10/2020     Ascites      ESRD (end stage renal disease) (H)      GERD (gastroesophageal reflux disease)      HE (hepatic encephalopathy) (H)      History of blood transfusion       Past Surgical History:   Procedure Laterality Date     COLONOSCOPY      [facility] 2020     GI SURGERY            Medications  Prior to Admission medications    Medication Sig Start Date End Date Taking? Authorizing Provider   FEROSUL 325 (65 Fe) MG tablet  7/5/20  Yes Reported, Patient   gabapentin (NEURONTIN) 100 MG capsule  8/11/20  Yes Reported, Patient   guaiFENesin (MUCINEX) 600 MG 12 hr tablet Take 600 mg by mouth 2 times daily as needed for congestion or cough  2/15/20  Yes Reported, Patient   lactulose (CHRONULAC) 10 GM/15ML solution Take 10 g by mouth daily as needed  2/15/20  Yes Reported, Patient   Menthol-Methyl Salicylate (ICY HOT EXTRA STRENGTH) 10-30 % CREA    Yes Reported, Patient   midodrine (PROAMATINE) 5 MG tablet Take 15 mg by mouth 2/15/20  Yes Reported, Patient   Misc. Devices (ROLLATOR ULTRA-LIGHT) MISC Walker with front wheels for home use. 1/27/20  Yes Reported, Patient   Multiple Vitamins-Minerals (SUPER THERA PRERNA M) TABS Take 1 tablet by mouth 12/29/19  Yes Reported, Patient   pantoprazole (PROTONIX) 40 MG EC tablet  8/11/20  Yes Reported, Patient   Psyllium 58.12 % PACK Bid daily   Yes Reported, Patient   vitamin B1 (VITAMIN B-1) 100 MG tablet Take 100 mg by mouth 3/9/20  Yes Reported, Patient   Cholecalciferol (VITAMIN D HIGH POTENCY) 25 MCG (1000 UT) CAPS TAKE ONE CAPSULE BY MOUTH DAILY  Patient not taking: Reported on 10/14/2020 7/6/20   Brandin Echavarria MD   ciprofloxacin (CIPRO) 250 MG tablet Take one 250 mg tab every 24 hours AFTER dialysis for 7 days.  Patient not taking: Reported on 6/16/2020 3/16/20   Brandin Echavarria MD   levETIRAcetam (KEPPRA) 500 MG tablet  8/1/20   Reported, Patient   OYSCO 500 + D 500-200 MG-UNIT tablet  "TAKE 1 TABLET BY MOUTH TWICE DAILY WITH MEALS  Patient not taking: Reported on 10/14/2020 7/8/20   Brandin Echavarria MD   torsemide (DEMADEX) 100 MG tablet Take 100 mg by mouth daily    Reported, Patient       Allergies  No Known Allergies    Review of systems  A 10-point review of systems was negative    Examination  BP (!) 116/90   Pulse 82   Wt 62.3 kg (137 lb 6.4 oz)   BMI 21.52 kg/m    Body mass index is 21.52 kg/m .  video visit exam brief selected:\"GENERAL: Healthy, alert and no distress\",\"EYES: Eyes grossly normal to inspection.  No discharge or erythema, or obvious scleral/conjunctival abnormalities.\",\"RESP: No audible wheeze, cough, or visible cyanosis.  No visible retractions or increased work of breathing.  \",\"SKIN: Visible skin clear. No significant rash, abnormal pigmentation or lesions.\",\"NEURO: Cranial nerves grossly intact.  Mentation and speech appropriate for age.\",\"PSYCH: Mentation appears normal, affect normal/bright, judgement and insight intact, normal speech and appearance well-groomed.    Laboratory    Lab Results   Component Value Date     07/15/2020    POTASSIUM 3.7 07/15/2020    CHLORIDE 99 07/15/2020    CO2 30 07/15/2020    BUN 32 07/15/2020    CR 2.15 07/15/2020       Lab Results   Component Value Date    BILITOTAL 3.4 07/15/2020    ALT 22 07/15/2020    AST 47 07/15/2020    ALKPHOS 170 07/15/2020       Lab Results   Component Value Date    ALBUMIN 3.0 07/15/2020    PROTTOTAL 6.8 07/15/2020        Lab Results   Component Value Date    WBC 6.1 07/15/2020    HGB 9.8 07/15/2020    MCV 97 07/15/2020     07/15/2020       Lab Results   Component Value Date    INR 1.37 07/15/2020     MELD-Na score: 17 at 12/31/2020  2:47 PM  MELD score: 13 at 12/31/2020  2:47 PM  Calculated from:  Serum Creatinine: 1.22 mg/dL at 12/31/2020  2:47 PM  Serum Sodium: 132 mmol/L at 12/31/2020  2:47 PM  Total Bilirubin: 1.5 mg/dL at 12/31/2020  2:47 PM  INR(ratio): 1.3 at 12/31/2020  2:47 " PM  Age: 35 years 6 months      ASSESSMENT AND PLAN:  Mr. Herrera has alcoholic liver disease.  He did abstain from alcoholic beverages now.  Since abstaining from alcoholic beverages now for over a year, he clinically improved.  In fact, his MELD score went from the 20s the last time we saw him to the 10s, which it is now.  He will be followed here every 6 months, and we congratulated him staying away from alcohol.        He does not have any hepatic encephalopathy.  We will repeat his labs in 3 months, at which time, we did discuss with him the possibility if the fluid stays and the paracentesis continues to be a problem for him, he could be considered for TIPS.  We did explain that to the patient and he will be seen here in 6 months.     cc:   Luda Caraballo MD   [facility]   [address] [place] [address]         Brandin Echavarria MD  Hepatology  [facility]      Video-Visit Details    Type of service:  Video Visit    Video End Time:9:29 AM.    Originating Location (pt. Location): Home    Distant Location (provider location):  [facility] [place]     Platform used for Video Visit: Johnny Echavarria MD

## 2021-01-04 NOTE — PROGRESS NOTES
Christo Herrera is a 35 year old male who is being evaluated via a billable video visit.      How would you like to obtain your AVS? MyChart  If the video visit is dropped, the invitation should be resent by: Text to cell phone: [phone]  Will anyone else be joining your video visit? No      Video Start Time: 9:08 AM.    [facility] [place]    Hepatology follow-up    CHIEF COMPLAINT AND REASON FOR VISIT:  Alcoholic liver disease.      CONSULTING HEALTHCARE PROFESSIONAL:  Luda Caraballo MD      SUBJECTIVE:  Mr. Herrera is a 35-year-old male with history of alcohol use disorder with alcoholic hepatitis and alcohol-related cirrhosis.  He was decompensated, mostly related with ascites and hepatic encephalopathy.  He also had some acute kidney injury and went on dialysis from 08/2020 and he also had esophageal varices.  He did relatively well since I last saw him.  He has no edema, but still has abdominal distention and is having every 10 days paracentesis.  He has no significant abdominal pain, nausea or vomiting.  He is moving his bowels on a regular basis, like 3 times a day.  He does not have any confusion, although historically he had, and has no memory issues.  He claims to be lethargic.  Otherwise, he tells me that he does not have any gastrointestinal bleeding like melena, hematemesis or hematochezia.  He also denies any fever, chills and he did get tested for COVID and this was a PCR, and he tended to be negative on 12/26.  He had also prior to that tested on the 8th with PCR and he was negative and still had also on 11/21 and he was negative.  He is telling me that he had historically tested with antibody and the antibody testing was positive, although the PCR, which was done later on multiple times, was negative.      Medical hx Surgical hx   Past Medical History:   Diagnosis Date     Alcoholic cirrhosis of liver with ascites (H) 3/10/2020     Ascites      ESRD (end stage renal  disease) (H)      GERD (gastroesophageal reflux disease)      HE (hepatic encephalopathy) (H)      History of blood transfusion       Past Surgical History:   Procedure Laterality Date     COLONOSCOPY      [place] [address]     GI SURGERY            Medications  Prior to Admission medications    Medication Sig Start Date End Date Taking? Authorizing Provider   FEROSUL 325 (65 Fe) MG tablet  7/5/20  Yes Reported, Patient   gabapentin (NEURONTIN) 100 MG capsule  8/11/20  Yes Reported, Patient   guaiFENesin (MUCINEX) 600 MG 12 hr tablet Take 600 mg by mouth 2 times daily as needed for congestion or cough  2/15/20  Yes Reported, Patient   lactulose (CHRONULAC) 10 GM/15ML solution Take 10 g by mouth daily as needed  2/15/20  Yes Reported, Patient   Menthol-Methyl Salicylate (ICY HOT EXTRA STRENGTH) 10-30 % CREA    Yes Reported, Patient   midodrine (PROAMATINE) 5 MG tablet Take 15 mg by mouth 2/15/20  Yes Reported, Patient   Misc. Devices (ROLLATOR ULTRA-LIGHT) MISC Walker with front wheels for home use. 1/27/20  Yes Reported, Patient   Multiple Vitamins-Minerals (SUPER THERA PRERNA M) TABS Take 1 tablet by mouth 12/29/19  Yes Reported, Patient   pantoprazole (PROTONIX) 40 MG EC tablet  8/11/20  Yes Reported, Patient   Psyllium 58.12 % PACK Bid daily   Yes Reported, Patient   vitamin B1 (VITAMIN B-1) 100 MG tablet Take 100 mg by mouth 3/9/20  Yes Reported, Patient   Cholecalciferol (VITAMIN D HIGH POTENCY) 25 MCG (1000 UT) CAPS TAKE ONE CAPSULE BY MOUTH DAILY  Patient not taking: Reported on 10/14/2020 7/6/20   Brandin Echavarria MD   ciprofloxacin (CIPRO) 250 MG tablet Take one 250 mg tab every 24 hours AFTER dialysis for 7 days.  Patient not taking: Reported on 6/16/2020 3/16/20   Brandin Echavarria MD   levETIRAcetam (KEPPRA) 500 MG tablet  8/1/20   Reported, Patient   OYSCO 500 + D 500-200 MG-UNIT tablet TAKE 1 TABLET BY MOUTH TWICE DAILY WITH MEALS  Patient not taking: Reported on 10/14/2020 7/8/20  "  Brandin Echavarria MD   torsemide (DEMADEX) 100 MG tablet Take 100 mg by mouth daily    Reported, Patient       Allergies  No Known Allergies    Review of systems  A 10-point review of systems was negative    Examination  BP (!) 116/90   Pulse 82   Wt 62.3 kg (137 lb 6.4 oz)   BMI 21.52 kg/m    Body mass index is 21.52 kg/m .  video visit exam brief selected:\"GENERAL: Healthy, alert and no distress\",\"EYES: Eyes grossly normal to inspection.  No discharge or erythema, or obvious scleral/conjunctival abnormalities.\",\"RESP: No audible wheeze, cough, or visible cyanosis.  No visible retractions or increased work of breathing.  \",\"SKIN: Visible skin clear. No significant rash, abnormal pigmentation or lesions.\",\"NEURO: Cranial nerves grossly intact.  Mentation and speech appropriate for age.\",\"PSYCH: Mentation appears normal, affect normal/bright, judgement and insight intact, normal speech and appearance well-groomed.    Laboratory    Lab Results   Component Value Date     07/15/2020    POTASSIUM 3.7 07/15/2020    CHLORIDE 99 07/15/2020    CO2 30 07/15/2020    BUN 32 07/15/2020    CR 2.15 07/15/2020       Lab Results   Component Value Date    BILITOTAL 3.4 07/15/2020    ALT 22 07/15/2020    AST 47 07/15/2020    ALKPHOS 170 07/15/2020       Lab Results   Component Value Date    ALBUMIN 3.0 07/15/2020    PROTTOTAL 6.8 07/15/2020        Lab Results   Component Value Date    WBC 6.1 07/15/2020    HGB 9.8 07/15/2020    MCV 97 07/15/2020     07/15/2020       Lab Results   Component Value Date    INR 1.37 07/15/2020     MELD-Na score: 17 at 12/31/2020  2:47 PM  MELD score: 13 at 12/31/2020  2:47 PM  Calculated from:  Serum Creatinine: 1.22 mg/dL at 12/31/2020  2:47 PM  Serum Sodium: 132 mmol/L at 12/31/2020  2:47 PM  Total Bilirubin: 1.5 mg/dL at 12/31/2020  2:47 PM  INR(ratio): 1.3 at 12/31/2020  2:47 PM  Age: 35 years 6 months      ASSESSMENT AND PLAN:  Mr. Herrera has alcoholic liver disease.  " He did abstain from alcoholic beverages now.  Since abstaining from alcoholic beverages now for over a year, he clinically improved.  In fact, his MELD score went from the 20s the last time we saw him to the 10s, which it is now.  He will be followed here every 6 months, and we congratulated him staying away from alcohol.        He does not have any hepatic encephalopathy.  We will repeat his labs in 3 months, at which time, we did discuss with him the possibility if the fluid stays and the paracentesis continues to be a problem for him, he could be considered for TIPS.  We did explain that to the patient and he will be seen here in 6 months.     cc:   Luda Caraballo MD   [facility]   [facility] [address] [address]         Brandin Echavarria MD  Hepatology  [facility]      Video-Visit Details    Type of service:  Video Visit    Video End Time:9:29 AM.    Originating Location (pt. Location): Home    Distant Location (provider location):  [facility] [place]     Platform used for Video Visit: [employer]

## 2021-01-04 NOTE — LETTER
1/4/2021         RE: Christo Herrera  [address] [place] [address]        Dear Colleague,    Thank you for referring your patient, Christo Herrera, to the [facility] [place]. Please see a copy of my visit note below.    Christo Herrera is a 35 year old male who is being evaluated via a billable video visit.      How would you like to obtain your AVS? MyChart  If the video visit is dropped, the invitation should be resent by: Text to cell phone: [phone]  Will anyone else be joining your video visit? No      Video Start Time: 9:08 AM.    [facility]    Hepatology follow-up    CHIEF COMPLAINT AND REASON FOR VISIT:  Alcoholic liver disease.      CONSULTING HEALTHCARE PROFESSIONAL:  Luda Caraballo MD      SUBJECTIVE:  Mr. Herrera is a 35-year-old male with history of alcohol use disorder with alcoholic hepatitis and alcohol-related cirrhosis.  He was decompensated, mostly related with ascites and hepatic encephalopathy.  He also had some acute kidney injury and went on dialysis from 08/2020 and he also had esophageal varices.  He did relatively well since I last saw him.  He has no edema, but still has abdominal distention and is having every 10 days paracentesis.  He has no significant abdominal pain, nausea or vomiting.  He is moving his bowels on a regular basis, like 3 times a day.  He does not have any confusion, although historically he had, and has no memory issues.  He claims to be lethargic.  Otherwise, he tells me that he does not have any gastrointestinal bleeding like melena, hematemesis or hematochezia.  He also denies any fever, chills and he did get tested for COVID and this was a PCR, and he tended to be negative on 12/26.  He had also prior to that tested on the 8th with PCR and he was negative and still had also on 11/21 and he was negative.  He is telling me that he had historically tested with antibody and  the antibody testing was positive, although the PCR, which was done later on multiple times, was negative.      Medical hx Surgical hx   Past Medical History:   Diagnosis Date     Alcoholic cirrhosis of liver with ascites (H) 3/10/2020     Ascites      ESRD (end stage renal disease) (H)      GERD (gastroesophageal reflux disease)      HE (hepatic encephalopathy) (H)      History of blood transfusion       Past Surgical History:   Procedure Laterality Date     COLONOSCOPY      [place] [address]     GI SURGERY            Medications  Prior to Admission medications    Medication Sig Start Date End Date Taking? Authorizing Provider   FEROSUL 325 (65 Fe) MG tablet  7/5/20  Yes Reported, Patient   gabapentin (NEURONTIN) 100 MG capsule  8/11/20  Yes Reported, Patient   guaiFENesin (MUCINEX) 600 MG 12 hr tablet Take 600 mg by mouth 2 times daily as needed for congestion or cough  2/15/20  Yes Reported, Patient   lactulose (CHRONULAC) 10 GM/15ML solution Take 10 g by mouth daily as needed  2/15/20  Yes Reported, Patient   Menthol-Methyl Salicylate (ICY HOT EXTRA STRENGTH) 10-30 % CREA    Yes Reported, Patient   midodrine (PROAMATINE) 5 MG tablet Take 15 mg by mouth 2/15/20  Yes Reported, Patient   Misc. Devices (ROLLATOR ULTRA-LIGHT) MISC Walker with front wheels for home use. 1/27/20  Yes Reported, Patient   Multiple Vitamins-Minerals (SUPER THERA PRERNA M) TABS Take 1 tablet by mouth 12/29/19  Yes Reported, Patient   pantoprazole (PROTONIX) 40 MG EC tablet  8/11/20  Yes Reported, Patient   Psyllium 58.12 % PACK Bid daily   Yes Reported, Patient   vitamin B1 (VITAMIN B-1) 100 MG tablet Take 100 mg by mouth 3/9/20  Yes Reported, Patient   Cholecalciferol (VITAMIN D HIGH POTENCY) 25 MCG (1000 UT) CAPS TAKE ONE CAPSULE BY MOUTH DAILY  Patient not taking: Reported on 10/14/2020 7/6/20   Brandin Echavarria MD   ciprofloxacin (CIPRO) 250 MG tablet Take one 250 mg tab every 24 hours AFTER dialysis for 7 days.  Patient not  "taking: Reported on 6/16/2020 3/16/20   Brandin Echavarria MD   levETIRAcetam (KEPPRA) 500 MG tablet  8/1/20   Reported, Patient   OYSCO 500 + D 500-200 MG-UNIT tablet TAKE 1 TABLET BY MOUTH TWICE DAILY WITH MEALS  Patient not taking: Reported on 10/14/2020 7/8/20   Brandin Echavarria MD   torsemide (DEMADEX) 100 MG tablet Take 100 mg by mouth daily    Reported, Patient       Allergies  No Known Allergies    Review of systems  A 10-point review of systems was negative    Examination  BP (!) 116/90   Pulse 82   Wt 62.3 kg (137 lb 6.4 oz)   BMI 21.52 kg/m    Body mass index is 21.52 kg/m .  video visit exam brief selected:\"GENERAL: Healthy, alert and no distress\",\"EYES: Eyes grossly normal to inspection.  No discharge or erythema, or obvious scleral/conjunctival abnormalities.\",\"RESP: No audible wheeze, cough, or visible cyanosis.  No visible retractions or increased work of breathing.  \",\"SKIN: Visible skin clear. No significant rash, abnormal pigmentation or lesions.\",\"NEURO: Cranial nerves grossly intact.  Mentation and speech appropriate for age.\",\"PSYCH: Mentation appears normal, affect normal/bright, judgement and insight intact, normal speech and appearance well-groomed.    Laboratory    Lab Results   Component Value Date     07/15/2020    POTASSIUM 3.7 07/15/2020    CHLORIDE 99 07/15/2020    CO2 30 07/15/2020    BUN 32 07/15/2020    CR 2.15 07/15/2020       Lab Results   Component Value Date    BILITOTAL 3.4 07/15/2020    ALT 22 07/15/2020    AST 47 07/15/2020    ALKPHOS 170 07/15/2020       Lab Results   Component Value Date    ALBUMIN 3.0 07/15/2020    PROTTOTAL 6.8 07/15/2020        Lab Results   Component Value Date    WBC 6.1 07/15/2020    HGB 9.8 07/15/2020    MCV 97 07/15/2020     07/15/2020       Lab Results   Component Value Date    INR 1.37 07/15/2020     MELD-Na score: 17 at 12/31/2020  2:47 PM  MELD score: 13 at 12/31/2020  2:47 PM  Calculated from:  Serum " Creatinine: 1.22 mg/dL at 12/31/2020  2:47 PM  Serum Sodium: 132 mmol/L at 12/31/2020  2:47 PM  Total Bilirubin: 1.5 mg/dL at 12/31/2020  2:47 PM  INR(ratio): 1.3 at 12/31/2020  2:47 PM  Age: 35 years 6 months      ASSESSMENT AND PLAN:  Mr. Herrera has alcoholic liver disease.  He did abstain from alcoholic beverages now.  Since abstaining from alcoholic beverages now for over a year, he clinically improved.  In fact, his MELD score went from the 20s the last time we saw him to the 10s, which it is now.  He will be followed here every 6 months, and we congratulated him staying away from alcohol.        He does not have any hepatic encephalopathy.  We will repeat his labs in 3 months, at which time, we did discuss with him the possibility if the fluid stays and the paracentesis continues to be a problem for him, he could be considered for TIPS.  We did explain that to the patient and he will be seen here in 6 months.     cc:   Luda Caraballo MD   [facility]   [address] [place] [address]         Brandin Echavarria MD  Hepatology  [facility]      Video-Visit Details    Type of service:  Video Visit    Video End Time:9:29 AM.    Originating Location (pt. Location): Home    Distant Location (provider location):  [facility] [place]     Platform used for Video Visit: [employer]        Again, thank you for allowing me to participate in the care of your patient.        Sincerely,        Brandin Echavarria MD

## 2021-01-07 ENCOUNTER — HOSPITAL ENCOUNTER (OUTPATIENT)
Dept: ULTRASOUND IMAGING | Facility: CLINIC | Age: 36
Discharge: HOME | End: 2021-01-07
Attending: INTERNAL MEDICINE | Admitting: RADIOLOGY

## 2021-01-07 DIAGNOSIS — R18.8 OTHER ASCITES: ICD-10-CM

## 2021-01-12 ENCOUNTER — ANCILLARY PROCEDURE (OUTPATIENT)
Dept: ULTRASOUND IMAGING | Facility: CLINIC | Age: 36
End: 2021-01-12
Attending: INTERNAL MEDICINE
Payer: COMMERCIAL

## 2021-01-12 DIAGNOSIS — K70.10 ACUTE ALCOHOLIC LIVER DISEASE (H): ICD-10-CM

## 2021-01-12 DIAGNOSIS — K70.31 ALCOHOLIC CIRRHOSIS OF LIVER WITH ASCITES (H): ICD-10-CM

## 2021-01-12 DIAGNOSIS — K76.6 PORTAL HYPERTENSION (H): ICD-10-CM

## 2021-01-12 LAB
ALBUMIN SERPL-MCNC: 3.3 G/DL (ref 3.4–5)
ALP SERPL-CCNC: 128 U/L (ref 40–150)
ALT SERPL W P-5'-P-CCNC: 32 U/L (ref 0–70)
ANION GAP SERPL CALCULATED.3IONS-SCNC: 6 MMOL/L (ref 3–14)
AST SERPL W P-5'-P-CCNC: 43 U/L (ref 0–45)
BILIRUB DIRECT SERPL-MCNC: 0.5 MG/DL (ref 0–0.2)
BILIRUB SERPL-MCNC: 1.2 MG/DL (ref 0.2–1.3)
BUN SERPL-MCNC: 48 MG/DL (ref 7–30)
CALCIUM SERPL-MCNC: 8.8 MG/DL (ref 8.5–10.1)
CHLORIDE SERPL-SCNC: 108 MMOL/L (ref 94–109)
CO2 SERPL-SCNC: 23 MMOL/L (ref 20–32)
CREAT SERPL-MCNC: 1.39 MG/DL (ref 0.66–1.25)
ERYTHROCYTE [DISTWIDTH] IN BLOOD BY AUTOMATED COUNT: 14.6 % (ref 10–15)
GFR SERPL CREATININE-BSD FRML MDRD: 65 ML/MIN/{1.73_M2}
GLUCOSE SERPL-MCNC: 93 MG/DL (ref 70–99)
HCT VFR BLD AUTO: 41.7 % (ref 40–53)
HGB BLD-MCNC: 13.4 G/DL (ref 13.3–17.7)
INR PPP: 1.31 (ref 0.86–1.14)
MCH RBC QN AUTO: 30.4 PG (ref 26.5–33)
MCHC RBC AUTO-ENTMCNC: 32.1 G/DL (ref 31.5–36.5)
MCV RBC AUTO: 95 FL (ref 78–100)
PLATELET # BLD AUTO: 135 10E9/L (ref 150–450)
POTASSIUM SERPL-SCNC: 5.4 MMOL/L (ref 3.4–5.3)
PROT SERPL-MCNC: 6.1 G/DL (ref 6.8–8.8)
RBC # BLD AUTO: 4.41 10E12/L (ref 4.4–5.9)
SODIUM SERPL-SCNC: 136 MMOL/L (ref 133–144)
WBC # BLD AUTO: 4.9 10E9/L (ref 4–11)

## 2021-01-12 PROCEDURE — 99000 SPECIMEN HANDLING OFFICE-LAB: CPT | Performed by: PATHOLOGY

## 2021-01-12 PROCEDURE — 85027 COMPLETE CBC AUTOMATED: CPT | Performed by: PATHOLOGY

## 2021-01-12 PROCEDURE — 85610 PROTHROMBIN TIME: CPT | Performed by: PATHOLOGY

## 2021-01-12 PROCEDURE — 80321 ALCOHOLS BIOMARKERS 1OR 2: CPT | Mod: 90 | Performed by: PATHOLOGY

## 2021-01-12 PROCEDURE — 36415 COLL VENOUS BLD VENIPUNCTURE: CPT | Performed by: PATHOLOGY

## 2021-01-12 PROCEDURE — 80053 COMPREHEN METABOLIC PANEL: CPT | Performed by: PATHOLOGY

## 2021-01-12 PROCEDURE — 93975 VASCULAR STUDY: CPT | Mod: GC | Performed by: RADIOLOGY

## 2021-01-12 PROCEDURE — 82248 BILIRUBIN DIRECT: CPT | Performed by: PATHOLOGY

## 2021-01-14 ENCOUNTER — AMBULATORY - HEALTHEAST (OUTPATIENT)
Dept: LAB | Facility: CLINIC | Age: 36
End: 2021-01-14

## 2021-01-14 DIAGNOSIS — Z11.59 ENCOUNTER FOR SCREENING FOR OTHER VIRAL DISEASES: ICD-10-CM

## 2021-01-15 ENCOUNTER — COMMUNICATION - HEALTHEAST (OUTPATIENT)
Dept: SCHEDULING | Facility: CLINIC | Age: 36
End: 2021-01-15

## 2021-01-18 ENCOUNTER — HOSPITAL ENCOUNTER (OUTPATIENT)
Dept: ULTRASOUND IMAGING | Facility: CLINIC | Age: 36
Discharge: HOME | End: 2021-01-18
Attending: INTERNAL MEDICINE

## 2021-01-18 DIAGNOSIS — R18.8 OTHER ASCITES: ICD-10-CM

## 2021-01-20 ENCOUNTER — AMBULATORY - HEALTHEAST (OUTPATIENT)
Dept: SCHEDULING | Facility: CLINIC | Age: 36
End: 2021-01-20

## 2021-01-20 DIAGNOSIS — R18.8 OTHER ASCITES: ICD-10-CM

## 2021-01-20 LAB — PETH BLD-MCNC: NEGATIVE NG/ML

## 2021-01-22 ENCOUNTER — AMBULATORY - HEALTHEAST (OUTPATIENT)
Dept: ULTRASOUND IMAGING | Facility: CLINIC | Age: 36
End: 2021-01-22

## 2021-01-22 DIAGNOSIS — Z11.59 ENCOUNTER FOR SCREENING FOR OTHER VIRAL DISEASES: ICD-10-CM

## 2021-01-27 ENCOUNTER — HOSPITAL ENCOUNTER (OUTPATIENT)
Dept: ULTRASOUND IMAGING | Facility: CLINIC | Age: 36
Discharge: HOME | End: 2021-01-27
Attending: INTERNAL MEDICINE

## 2021-01-27 DIAGNOSIS — R18.8 OTHER ASCITES: ICD-10-CM

## 2021-02-02 ENCOUNTER — AMBULATORY - HEALTHEAST (OUTPATIENT)
Dept: LAB | Facility: CLINIC | Age: 36
End: 2021-02-02

## 2021-02-02 DIAGNOSIS — Z11.59 ENCOUNTER FOR SCREENING FOR OTHER VIRAL DISEASES: ICD-10-CM

## 2021-02-04 ENCOUNTER — COMMUNICATION - HEALTHEAST (OUTPATIENT)
Dept: SCHEDULING | Facility: CLINIC | Age: 36
End: 2021-02-04

## 2021-02-05 ENCOUNTER — HOSPITAL ENCOUNTER (OUTPATIENT)
Dept: ULTRASOUND IMAGING | Facility: CLINIC | Age: 36
Discharge: HOME | End: 2021-02-05
Attending: INTERNAL MEDICINE

## 2021-02-05 DIAGNOSIS — R18.8 OTHER ASCITES: ICD-10-CM

## 2021-02-08 ENCOUNTER — EXTERNAL ORDER RESULTS (OUTPATIENT)
Dept: TRANSPLANT | Facility: CLINIC | Age: 36
End: 2021-02-08

## 2021-02-08 DIAGNOSIS — K70.31 ALCOHOLIC CIRRHOSIS OF LIVER WITH ASCITES (H): ICD-10-CM

## 2021-02-09 ENCOUNTER — ORGAN (OUTPATIENT)
Dept: TRANSPLANT | Facility: CLINIC | Age: 36
End: 2021-02-09

## 2021-02-09 ENCOUNTER — AMBULATORY - HEALTHEAST (OUTPATIENT)
Dept: ULTRASOUND IMAGING | Facility: CLINIC | Age: 36
End: 2021-02-09

## 2021-02-09 DIAGNOSIS — Z11.59 ENCOUNTER FOR SCREENING FOR OTHER VIRAL DISEASES: ICD-10-CM

## 2021-02-09 LAB
ALBUMIN (EXTERNAL): 3.8 G/DL (ref 3.5–5.2)
BILIRUB SERPL-MCNC: 1 MG/DL (ref 0.2–1.2)
CREATININE (EXTERNAL): 1.34 MG/DL (ref 0.72–1.25)
GFR ESTIMATED (EXTERNAL): >60 ML/MIN/1.73M2
GFR ESTIMATED (IF AFRICAN AMERICAN) (EXTERNAL): >60 ML/MIN/1.73M2
INR (EXTERNAL) - DO NOT USE: 1.2
PT - PROTHROMBINE TIME (EXTERNAL): 15.3 SEC (ref 12–14.6)
SODIUM (EXTERNAL): 137 MMOL/L (ref 135–145)

## 2021-02-09 NOTE — TELEPHONE ENCOUNTER
Organ Offer Encounter Information    Organ Offer Information  Organ offer date & time: 2/9/2021  3:44 PM  Coordinator/Fellow/Attending name: Kaia Moody RN   Organ(s):  Organ UNOS ID Match Run ID Comment Organ Laterality   Liver [id number]       Recent infections?: No    New medications?: No Recent pregnancy?: No   Angicoagulation medications?: No Recent vaccinations?: No   Recent blood transfusions?: No Recent hospitalizations?: No   Has your insurance changed in the last 6-12 months?: Neg    Discussed organ offer with: Patient  Patient/Caregiver name: Christo  Discussed risk category with Patient/Other: N/A  Understood donor criteria, verbalized understanding  Patient/Other asked to speak to a surgeon?: Yes  Surgeon: Jose Merino MD Date & Time of Call: 2/9/2021  3:55 PM   Discussed program-specific outcomes: Did not have questions regarding SRTR  Right to decline organ offer without penalty, Patient/Other: Aware of option to decline without penalty  Organ disposition: Case Cancelled - Donor quality - Other (specify)  Additional Comments: 2/9/2021 3:46 PM  Liver: primary   MD: Davie  OPO Contact: Ashley  Donor/Recip HCV Status: Positive/Negative  (HCV+ Donors - Discuss HCV genotyping/quant testing with MD & send message to [facility] HCV POOL)  Donor Nutritional Status: unknown  Plan (NPO, Donor OR): Donor OR set for 1600, Dr. Broderick spoke to [facility] and they are unable to delay OR as procurement team is already at the hospital.   - - -   COVID Screening  In the past month, have you had:  Any close contact with a suspect or laboratory-confirmed COVID-19 patient: No  Travel anywhere: No  Fever: No  Cough: No  Short of Breath: No  Loss of Taste/Smell: No  Rash: No    Admissions: Lissa 1543 - ETA 1630  Unit: 1550 - 7A Ana Lilia, will have bed available at 1700  Update Provider Entering Orders (XM Plan & COVID Testing):  1626 - Kinsey NP - needs COVID swab and normal transplant  workup  Immunology: 1626 - Kacey, no final crossmatch needed  Inpatient Lab (COVID Testing [phone], Option 2): 1629 - Nikesha - will look for COVID swab  Book OR: Book for 2200  Vessel Storage Confirmation (PA/EITAN/CARLOS): Not ok to bank, liver Hep C +  Blood Bank: [facility]  Research: ON HOLD  TransNet/ABO Verification: p  Add Organ: Done @ 1643    2/9/2021 4:46 PM:   Hal Mancini is setting up transportation.  Kaia Moody  Transplant Coordinator    Donor OR Time: 1600cst  Procuring MD: Dr. Moore  Contact in the OR: Latanya Mobley [phone]  Organs Being Procured: Liver/Kidneys  Flush Solution:   Biopsy:   Pump:   Special Requests:   MD for Visualization:   Transportation Details:     2/9/2021 5:07 PM:   Dr. Broderick just called me to tell me that the liver is fatty and we should decline 830 for center. Called the pt to explain, all questions answered. Notified [facility] coordinator Ashley Perez as well as 7A charge Kinsey Martin Bonnie @ [facility], blood bank of cancellation.   Attestation I have discussed all of the above with the Patient/Legal Guardian/Caregiver regarding this organ offer.: Yes  Coordinator/Fellow/Attending name: Kaia Moody RN

## 2021-02-11 ENCOUNTER — AMBULATORY - HEALTHEAST (OUTPATIENT)
Dept: ULTRASOUND IMAGING | Facility: CLINIC | Age: 36
End: 2021-02-11

## 2021-02-11 DIAGNOSIS — Z11.59 ENCOUNTER FOR SCREENING FOR OTHER VIRAL DISEASES: ICD-10-CM

## 2021-02-15 ENCOUNTER — EXTERNAL ORDER RESULTS (OUTPATIENT)
Dept: TRANSPLANT | Facility: CLINIC | Age: 36
End: 2021-02-15

## 2021-02-15 DIAGNOSIS — K70.31 ALCOHOLIC CIRRHOSIS OF LIVER WITH ASCITES (H): ICD-10-CM

## 2021-02-16 ENCOUNTER — AMBULATORY - HEALTHEAST (OUTPATIENT)
Dept: ULTRASOUND IMAGING | Facility: CLINIC | Age: 36
End: 2021-02-16

## 2021-02-16 ENCOUNTER — HOSPITAL ENCOUNTER (OUTPATIENT)
Dept: ULTRASOUND IMAGING | Facility: CLINIC | Age: 36
Discharge: HOME | End: 2021-02-16
Attending: INTERNAL MEDICINE | Admitting: RADIOLOGY

## 2021-02-16 DIAGNOSIS — Z11.59 ENCOUNTER FOR SCREENING FOR OTHER VIRAL DISEASES: ICD-10-CM

## 2021-02-16 DIAGNOSIS — R18.8 OTHER ASCITES: ICD-10-CM

## 2021-02-16 LAB
ALBUMIN (EXTERNAL): 3.7 G/DL (ref 3.5–5.2)
BILIRUB SERPL-MCNC: 1.6 MG/DL (ref 0.2–1.2)
CREATININE (EXTERNAL): 1.42 MG/DL (ref 0.72–1.25)
GFR ESTIMATED (EXTERNAL): 57 ML/MIN/1.73M2
INR (EXTERNAL) - DO NOT USE: 1.1
PT - PROTHROMBINE TIME (EXTERNAL): 14.8 SEC (ref 12–14.6)
SODIUM (EXTERNAL): 137 MMOL/L (ref 135–145)

## 2021-02-19 ENCOUNTER — HOSPITAL ENCOUNTER (OUTPATIENT)
Dept: ULTRASOUND IMAGING | Facility: CLINIC | Age: 36
Discharge: HOME | End: 2021-02-19
Attending: INTERNAL MEDICINE

## 2021-02-23 ENCOUNTER — AMBULATORY - HEALTHEAST (OUTPATIENT)
Dept: LAB | Facility: CLINIC | Age: 36
End: 2021-02-23

## 2021-02-23 DIAGNOSIS — Z11.59 ENCOUNTER FOR SCREENING FOR OTHER VIRAL DISEASES: ICD-10-CM

## 2021-02-24 ENCOUNTER — AMBULATORY - HEALTHEAST (OUTPATIENT)
Dept: SCHEDULING | Facility: CLINIC | Age: 36
End: 2021-02-24

## 2021-02-24 DIAGNOSIS — R18.8 OTHER ASCITES: ICD-10-CM

## 2021-02-25 ENCOUNTER — COMMUNICATION - HEALTHEAST (OUTPATIENT)
Dept: SCHEDULING | Facility: CLINIC | Age: 36
End: 2021-02-25

## 2021-02-26 ENCOUNTER — HOSPITAL ENCOUNTER (OUTPATIENT)
Dept: ULTRASOUND IMAGING | Facility: CLINIC | Age: 36
Discharge: HOME | End: 2021-02-26
Attending: INTERNAL MEDICINE

## 2021-02-26 DIAGNOSIS — R18.8 OTHER ASCITES: ICD-10-CM

## 2021-03-09 ENCOUNTER — AMBULATORY - HEALTHEAST (OUTPATIENT)
Dept: LAB | Facility: CLINIC | Age: 36
End: 2021-03-09

## 2021-03-09 DIAGNOSIS — Z11.59 ENCOUNTER FOR SCREENING FOR OTHER VIRAL DISEASES: ICD-10-CM

## 2021-03-10 ENCOUNTER — AMBULATORY - HEALTHEAST (OUTPATIENT)
Dept: ULTRASOUND IMAGING | Facility: HOSPITAL | Age: 36
End: 2021-03-10

## 2021-03-10 DIAGNOSIS — Z11.59 ENCOUNTER FOR SCREENING FOR OTHER VIRAL DISEASES: ICD-10-CM

## 2021-03-11 ENCOUNTER — COMMUNICATION - HEALTHEAST (OUTPATIENT)
Dept: SCHEDULING | Facility: CLINIC | Age: 36
End: 2021-03-11

## 2021-03-12 ENCOUNTER — HOSPITAL ENCOUNTER (OUTPATIENT)
Dept: ULTRASOUND IMAGING | Facility: CLINIC | Age: 36
Discharge: HOME | End: 2021-03-12
Attending: INTERNAL MEDICINE

## 2021-03-12 DIAGNOSIS — R18.8 OTHER ASCITES: ICD-10-CM

## 2021-03-13 ENCOUNTER — COMMUNICATION - HEALTHEAST (OUTPATIENT)
Dept: SCHEDULING | Facility: CLINIC | Age: 36
End: 2021-03-13

## 2021-03-16 ENCOUNTER — AMBULATORY - HEALTHEAST (OUTPATIENT)
Dept: NURSING | Facility: CLINIC | Age: 36
End: 2021-03-16

## 2021-03-22 ENCOUNTER — HOSPITAL ENCOUNTER (OUTPATIENT)
Dept: ULTRASOUND IMAGING | Facility: CLINIC | Age: 36
Discharge: HOME | End: 2021-03-22
Attending: INTERNAL MEDICINE | Admitting: RADIOLOGY

## 2021-03-22 DIAGNOSIS — R18.8 OTHER ASCITES: ICD-10-CM

## 2021-03-29 ENCOUNTER — AMBULATORY - HEALTHEAST (OUTPATIENT)
Dept: LAB | Facility: CLINIC | Age: 36
End: 2021-03-29

## 2021-03-29 DIAGNOSIS — Z11.59 ENCOUNTER FOR SCREENING FOR OTHER VIRAL DISEASES: ICD-10-CM

## 2021-03-31 ENCOUNTER — COMMUNICATION - HEALTHEAST (OUTPATIENT)
Dept: SCHEDULING | Facility: CLINIC | Age: 36
End: 2021-03-31

## 2021-04-01 ENCOUNTER — HOSPITAL ENCOUNTER (OUTPATIENT)
Dept: ULTRASOUND IMAGING | Facility: CLINIC | Age: 36
Discharge: HOME | End: 2021-04-01
Attending: INTERNAL MEDICINE | Admitting: RADIOLOGY

## 2021-04-01 ENCOUNTER — HOSPITAL ENCOUNTER (OUTPATIENT)
Dept: ULTRASOUND IMAGING | Facility: CLINIC | Age: 36
Discharge: HOME | End: 2021-04-01
Attending: INTERNAL MEDICINE

## 2021-04-01 DIAGNOSIS — R18.8 OTHER ASCITES: ICD-10-CM

## 2021-04-04 ENCOUNTER — AMBULATORY - HEALTHEAST (OUTPATIENT)
Dept: ULTRASOUND IMAGING | Facility: CLINIC | Age: 36
End: 2021-04-04

## 2021-04-04 DIAGNOSIS — Z11.59 ENCOUNTER FOR SCREENING FOR OTHER VIRAL DISEASES: ICD-10-CM

## 2021-04-05 ENCOUNTER — VIRTUAL VISIT (OUTPATIENT)
Dept: GASTROENTEROLOGY | Facility: CLINIC | Age: 36
End: 2021-04-05
Attending: INTERNAL MEDICINE
Payer: COMMERCIAL

## 2021-04-05 DIAGNOSIS — K70.10 ACUTE ALCOHOLIC LIVER DISEASE (H): Primary | ICD-10-CM

## 2021-04-05 PROCEDURE — 99214 OFFICE O/P EST MOD 30 MIN: CPT | Mod: 95 | Performed by: INTERNAL MEDICINE

## 2021-04-05 ASSESSMENT — PAIN SCALES - GENERAL: PAINLEVEL: NO PAIN (0)

## 2021-04-05 NOTE — LETTER
4/5/2021         RE: Christo Herrera  [address] [place] [address]        Dear Colleague,    Thank you for referring your patient, Christo Herrera, to the [facility]PAT[facility] [place]. Please see a copy of my visit note below.    Christo is a 35 year old who is being evaluated via a billable video visit.      How would you like to obtain your AVS? Mail a copy  If the video visit is dropped, the invitation should be resent by: Text to cell phone: [phone]  Will anyone else be joining your video visit? No      Video Start Time: 9:05 AM.    [facility]    Hepatology follow-up    CHIEF COMPLAINT AND REASON FOR VISIT:  Alcoholic liver disease.      CONSULTING HEALTHCARE PROFESSIONAL:  Luda Caraballo MD       SUBJECTIVE:  Mr. Herrera is a 35-year-old male who carries a diagnosis of alcoholic liver disease.  He had in my opinion alcoholic hepatitis/alcohol-related chronic liver disease.  He was decompensated, had ascites and had also some hepatic encephalopathy.        Mr. Herrera also had acute kidney injury and was on dialysis on 08/2020.  There were also esophageal varices.        He did abstain from alcoholic beverages and since then, he relatively did well.  The ascites got much better, still continues to have abdominal distention, but is having paracentesis every 2 weeks.  The last time, he had 6 liters removed.        He  is not jaundiced.  He has no lethargy or confusion.  He has also no gastrointestinal bleeding like melena, hematemesis or hematochezia.  His weight is around 130 pounds after he has the paracentesis.        He also today denies any fever.  He has gotten the first vaccination and he is getting the second dose.  He otherwise is doing quite well.  He is careful with salt also.   Patient denies fevers, sweats or chills.      Medical hx Surgical hx   Past Medical History:   Diagnosis Date     Alcoholic cirrhosis of liver  with ascites (H) 3/10/2020     Ascites      ESRD (end stage renal disease) (H)      GERD (gastroesophageal reflux disease)      HE (hepatic encephalopathy) (H)      History of blood transfusion       Past Surgical History:   Procedure Laterality Date     COLONOSCOPY      [facility] 2020     GI SURGERY            Medications  Prior to Admission medications    Medication Sig Start Date End Date Taking? Authorizing Provider   gabapentin (NEURONTIN) 100 MG capsule Take 100 mg by mouth nightly as needed  8/11/20  Yes Reported, Patient   guaiFENesin (MUCINEX) 600 MG 12 hr tablet Take 600 mg by mouth 2 times daily as needed for congestion or cough  2/15/20  Yes Reported, Patient   lactulose (CHRONULAC) 10 GM/15ML solution Take 10 g by mouth daily as needed  2/15/20  Yes Reported, Patient   Menthol-Methyl Salicylate (ICY HOT EXTRA STRENGTH) 10-30 % CREA    Yes Reported, Patient   midodrine (PROAMATINE) 5 MG tablet Take 5 mg by mouth 3 times daily  2/15/20  Yes Reported, Patient   Multiple Vitamins-Minerals (SUPER THERA PRERNA M) TABS Take 1 tablet by mouth 12/29/19  Yes Reported, Patient   Psyllium 58.12 % PACK Take by mouth 2 times daily    Yes Reported, Patient   Cholecalciferol (VITAMIN D HIGH POTENCY) 25 MCG (1000 UT) CAPS TAKE ONE CAPSULE BY MOUTH DAILY  Patient not taking: Reported on 10/14/2020 7/6/20   Brandin Echavarria MD   ciprofloxacin (CIPRO) 250 MG tablet Take one 250 mg tab every 24 hours AFTER dialysis for 7 days.  Patient not taking: Reported on 6/16/2020 3/16/20   Brandin Echavarria MD   FEROSUL 325 (65 Fe) MG tablet  7/5/20   Reported, Patient   levETIRAcetam (KEPPRA) 500 MG tablet  8/1/20   Reported, Patient   Misc. Devices (ROLLATOR ULTRA-LIGHT) MISC Walker with front wheels for home use. 1/27/20   Reported, Patient   OYSCO 500 + D 500-200 MG-UNIT tablet TAKE 1 TABLET BY MOUTH TWICE DAILY WITH MEALS  Patient not taking: Reported on 10/14/2020 7/8/20   Brandin Echavarria  MD   pantoprazole (PROTONIX) 40 MG EC tablet Take 40 mg by mouth  8/11/20   Reported, Patient   torsemide (DEMADEX) 100 MG tablet Take 100 mg by mouth daily    Reported, Patient   vitamin B1 (VITAMIN B-1) 100 MG tablet Take 100 mg by mouth 3/9/20   Reported, Patient       Allergies  No Known Allergies    Review of systems  A 10-point review of systems was negative    Examination  There were no vitals taken for this visit.  There is no height or weight on file to calculate BMI.    Gen- well, NAD, A+Ox3, normal color  Psych- normal mood    Laboratory  Lab Results   Component Value Date     01/12/2021    POTASSIUM 5.4 01/12/2021    CHLORIDE 108 01/12/2021    CO2 23 01/12/2021    BUN 48 01/12/2021    CR 1.39 01/12/2021       Lab Results   Component Value Date    BILITOTAL 1.2 01/12/2021    ALT 32 01/12/2021    AST 43 01/12/2021    ALKPHOS 128 01/12/2021       Lab Results   Component Value Date    ALBUMIN 3.3 01/12/2021    PROTTOTAL 6.1 01/12/2021        Lab Results   Component Value Date    WBC 4.9 01/12/2021    HGB 13.4 01/12/2021    MCV 95 01/12/2021     01/12/2021       Lab Results   Component Value Date    INR 1.31 01/12/2021       Radiology    ASSESSMENT AND PLAN:  Alcoholic liver disease.        Mr. Herrera has alcoholic liver disease.  He had improved with abstinence and his MELD score has also improved.  This might mean that he might have had significant inflammation (alcoholic hepatitis/alcoholic chronic liver disease).        He was decompensated and had fluid retention with ascites.  He also had signs of hepatic encephalopathy.  He also had some kidney injury, which has since improved with his creatinine now being 1.37.        As his MELD score has dropped to the 14 range and likewise his MELD sodium, the plan will be to consider him for a living donor liver transplantation, although patient claims that he has no donor available, but if at all a donor becomes available, then it will be for  quality of life issues as his MELD score is lower now.        Besides that, we will continue doing surveillance for HCC, and he will need an upper endoscopy as his last one was 14 months ago.  Please note that he had grade 1 esophageal varices in the past.  We will also get an ultrasound in June when he is coming back and that will be a complete ultrasound.       Brandin Echavarria MD  Hepatology  [facility]      Video-Visit Details    Type of service:  Video Visit    Video End Time:9:24 AM.    Originating Location (pt. Location): Home    Distant Location (provider location):  [facility]PATOLOG[facility] [place]     Platform used for Video Visit: [employer]Well        Again, thank you for allowing me to participate in the care of your patient.        Sincerely,        Brandin Echavarria MD

## 2021-04-05 NOTE — PROGRESS NOTES
Christo is a 35 year old who is being evaluated via a billable video visit.      How would you like to obtain your AVS? Mail a copy  If the video visit is dropped, the invitation should be resent by: Text to cell phone: [phone]  Will anyone else be joining your video visit? No      Video Start Time: 9:05 AM.    [facility]    Hepatology follow-up    CHIEF COMPLAINT AND REASON FOR VISIT:  Alcoholic liver disease.      CONSULTING HEALTHCARE PROFESSIONAL:  Luda Caraballo MD       SUBJECTIVE:  Mr. Herrera is a 35-year-old male who carries a diagnosis of alcoholic liver disease.  He had in my opinion alcoholic hepatitis/alcohol-related chronic liver disease.  He was decompensated, had ascites and had also some hepatic encephalopathy.        Mr. Herrera also had acute kidney injury and was on dialysis on 08/2020.  There were also esophageal varices.        He did abstain from alcoholic beverages and since then, he relatively did well.  The ascites got much better, still continues to have abdominal distention, but is having paracentesis every 2 weeks.  The last time, he had 6 liters removed.        He  is not jaundiced.  He has no lethargy or confusion.  He has also no gastrointestinal bleeding like melena, hematemesis or hematochezia.  His weight is around 130 pounds after he has the paracentesis.        He also today denies any fever.  He has gotten the first vaccination and he is getting the second dose.  He otherwise is doing quite well.  He is careful with salt also.   Patient denies fevers, sweats or chills.      Medical hx Surgical hx   Past Medical History:   Diagnosis Date     Alcoholic cirrhosis of liver with ascites (H) 3/10/2020     Ascites      ESRD (end stage renal disease) (H)      GERD (gastroesophageal reflux disease)      HE (hepatic encephalopathy) (H)      History of blood transfusion       Past Surgical History:   Procedure Laterality Date     COLONOSCOPY       [facility] 2020     GI SURGERY            Medications  Prior to Admission medications    Medication Sig Start Date End Date Taking? Authorizing Provider   gabapentin (NEURONTIN) 100 MG capsule Take 100 mg by mouth nightly as needed  8/11/20  Yes Reported, Patient   guaiFENesin (MUCINEX) 600 MG 12 hr tablet Take 600 mg by mouth 2 times daily as needed for congestion or cough  2/15/20  Yes Reported, Patient   lactulose (CHRONULAC) 10 GM/15ML solution Take 10 g by mouth daily as needed  2/15/20  Yes Reported, Patient   Menthol-Methyl Salicylate (ICY HOT EXTRA STRENGTH) 10-30 % CREA    Yes Reported, Patient   midodrine (PROAMATINE) 5 MG tablet Take 5 mg by mouth 3 times daily  2/15/20  Yes Reported, Patient   Multiple Vitamins-Minerals (SUPER THERA PRERNA M) TABS Take 1 tablet by mouth 12/29/19  Yes Reported, Patient   Psyllium 58.12 % PACK Take by mouth 2 times daily    Yes Reported, Patient   Cholecalciferol (VITAMIN D HIGH POTENCY) 25 MCG (1000 UT) CAPS TAKE ONE CAPSULE BY MOUTH DAILY  Patient not taking: Reported on 10/14/2020 7/6/20   Brandin Echavarria MD   ciprofloxacin (CIPRO) 250 MG tablet Take one 250 mg tab every 24 hours AFTER dialysis for 7 days.  Patient not taking: Reported on 6/16/2020 3/16/20   Brandin Echavarria MD   FEROSUL 325 (65 Fe) MG tablet  7/5/20   Reported, Patient   levETIRAcetam (KEPPRA) 500 MG tablet  8/1/20   Reported, Patient   Misc. Devices (ROLLATOR ULTRA-LIGHT) MISC Walker with front wheels for home use. 1/27/20   Reported, Patient   OYSCO 500 + D 500-200 MG-UNIT tablet TAKE 1 TABLET BY MOUTH TWICE DAILY WITH MEALS  Patient not taking: Reported on 10/14/2020 7/8/20   Brandin Echavarria MD   pantoprazole (PROTONIX) 40 MG EC tablet Take 40 mg by mouth  8/11/20   Reported, Patient   torsemide (DEMADEX) 100 MG tablet Take 100 mg by mouth daily    Reported, Patient   vitamin B1 (VITAMIN B-1) 100 MG tablet Take 100 mg by mouth 3/9/20   Reported, Patient        Allergies  No Known Allergies    Review of systems  A 10-point review of systems was negative    Examination  There were no vitals taken for this visit.  There is no height or weight on file to calculate BMI.    Gen- well, NAD, A+Ox3, normal color  Psych- normal mood    Laboratory  Lab Results   Component Value Date     01/12/2021    POTASSIUM 5.4 01/12/2021    CHLORIDE 108 01/12/2021    CO2 23 01/12/2021    BUN 48 01/12/2021    CR 1.39 01/12/2021       Lab Results   Component Value Date    BILITOTAL 1.2 01/12/2021    ALT 32 01/12/2021    AST 43 01/12/2021    ALKPHOS 128 01/12/2021       Lab Results   Component Value Date    ALBUMIN 3.3 01/12/2021    PROTTOTAL 6.1 01/12/2021        Lab Results   Component Value Date    WBC 4.9 01/12/2021    HGB 13.4 01/12/2021    MCV 95 01/12/2021     01/12/2021       Lab Results   Component Value Date    INR 1.31 01/12/2021       Radiology    ASSESSMENT AND PLAN:  Alcoholic liver disease.        Mr. Herrera has alcoholic liver disease.  He had improved with abstinence and his MELD score has also improved.  This might mean that he might have had significant inflammation (alcoholic hepatitis/alcoholic chronic liver disease).        He was decompensated and had fluid retention with ascites.  He also had signs of hepatic encephalopathy.  He also had some kidney injury, which has since improved with his creatinine now being 1.37.        As his MELD score has dropped to the 14 range and likewise his MELD sodium, the plan will be to consider him for a living donor liver transplantation, although patient claims that he has no donor available, but if at all a donor becomes available, then it will be for quality of life issues as his MELD score is lower now.        Besides that, we will continue doing surveillance for HCC, and he will need an upper endoscopy as his last one was 14 months ago.  Please note that he had grade 1 esophageal varices in the past.  We will also  get an ultrasound in June when he is coming back and that will be a complete ultrasound.       Brandin Echavarria MD  Hepatology  [facility]      Video-Visit Details    Type of service:  Video Visit    Video End Time:9:24 AM.    Originating Location (pt. Location): Home    Distant Location (provider location):  [facility]PAT[facility] [place]     Platform used for Video Visit: [place] [employer]

## 2021-04-06 ENCOUNTER — AMBULATORY - HEALTHEAST (OUTPATIENT)
Dept: NURSING | Facility: CLINIC | Age: 36
End: 2021-04-06

## 2021-04-12 ENCOUNTER — HOSPITAL ENCOUNTER (OUTPATIENT)
Dept: ULTRASOUND IMAGING | Facility: CLINIC | Age: 36
Discharge: HOME | End: 2021-04-12
Attending: INTERNAL MEDICINE | Admitting: RADIOLOGY

## 2021-04-12 DIAGNOSIS — R18.8 OTHER ASCITES: ICD-10-CM

## 2021-04-14 ENCOUNTER — HOSPITAL ENCOUNTER (OUTPATIENT)
Dept: ULTRASOUND IMAGING | Facility: HOSPITAL | Age: 36
Discharge: HOME | End: 2021-04-14
Attending: INTERNAL MEDICINE

## 2021-04-20 ENCOUNTER — AMBULATORY - HEALTHEAST (OUTPATIENT)
Dept: LAB | Facility: CLINIC | Age: 36
End: 2021-04-20

## 2021-04-20 DIAGNOSIS — Z11.59 ENCOUNTER FOR SCREENING FOR OTHER VIRAL DISEASES: ICD-10-CM

## 2021-04-22 ENCOUNTER — COMMUNICATION - HEALTHEAST (OUTPATIENT)
Dept: SCHEDULING | Facility: CLINIC | Age: 36
End: 2021-04-22

## 2021-04-23 ENCOUNTER — HOSPITAL ENCOUNTER (OUTPATIENT)
Dept: ULTRASOUND IMAGING | Facility: CLINIC | Age: 36
Discharge: HOME | End: 2021-04-23
Attending: INTERNAL MEDICINE

## 2021-04-23 DIAGNOSIS — R18.8 OTHER ASCITES: ICD-10-CM

## 2021-05-03 ENCOUNTER — RECORDS - HEALTHEAST (OUTPATIENT)
Dept: ADMINISTRATIVE | Facility: OTHER | Age: 36
End: 2021-05-03

## 2021-05-04 ENCOUNTER — HOSPITAL ENCOUNTER (OUTPATIENT)
Dept: ULTRASOUND IMAGING | Facility: CLINIC | Age: 36
Discharge: HOME | End: 2021-05-04
Attending: INTERNAL MEDICINE
Payer: COMMERCIAL

## 2021-05-04 ENCOUNTER — AMBULATORY - HEALTHEAST (OUTPATIENT)
Dept: INTERVENTIONAL RADIOLOGY/VASCULAR | Facility: CLINIC | Age: 36
End: 2021-05-04

## 2021-05-04 DIAGNOSIS — R18.8 OTHER ASCITES: ICD-10-CM

## 2021-05-04 DIAGNOSIS — Z11.59 ENCOUNTER FOR SCREENING FOR OTHER VIRAL DISEASES: ICD-10-CM

## 2021-05-09 ENCOUNTER — AMBULATORY - HEALTHEAST (OUTPATIENT)
Dept: ULTRASOUND IMAGING | Facility: CLINIC | Age: 36
End: 2021-05-09

## 2021-05-09 DIAGNOSIS — Z11.59 ENCOUNTER FOR SCREENING FOR OTHER VIRAL DISEASES: ICD-10-CM

## 2021-05-11 ENCOUNTER — AMBULATORY - HEALTHEAST (OUTPATIENT)
Dept: LAB | Facility: CLINIC | Age: 36
End: 2021-05-11

## 2021-05-11 DIAGNOSIS — Z11.59 ENCOUNTER FOR SCREENING FOR OTHER VIRAL DISEASES: ICD-10-CM

## 2021-05-12 ENCOUNTER — COMMUNICATION - HEALTHEAST (OUTPATIENT)
Dept: SCHEDULING | Facility: CLINIC | Age: 36
End: 2021-05-12

## 2021-05-13 ENCOUNTER — RECORDS - HEALTHEAST (OUTPATIENT)
Dept: ADMINISTRATIVE | Facility: OTHER | Age: 36
End: 2021-05-13

## 2021-05-14 ENCOUNTER — HOSPITAL ENCOUNTER (OUTPATIENT)
Dept: ULTRASOUND IMAGING | Facility: CLINIC | Age: 36
Discharge: HOME | End: 2021-05-14
Attending: INTERNAL MEDICINE
Payer: COMMERCIAL

## 2021-05-14 DIAGNOSIS — R18.8 OTHER ASCITES: ICD-10-CM

## 2021-05-18 ENCOUNTER — RECORDS - HEALTHEAST (OUTPATIENT)
Dept: ADMINISTRATIVE | Facility: OTHER | Age: 36
End: 2021-05-18

## 2021-05-24 ENCOUNTER — HOSPITAL ENCOUNTER (OUTPATIENT)
Dept: ULTRASOUND IMAGING | Facility: CLINIC | Age: 36
Discharge: HOME | End: 2021-05-24
Attending: INTERNAL MEDICINE
Payer: COMMERCIAL

## 2021-05-24 ENCOUNTER — HOSPITAL ENCOUNTER (OUTPATIENT)
Dept: ULTRASOUND IMAGING | Facility: CLINIC | Age: 36
Discharge: HOME | End: 2021-05-24
Attending: INTERNAL MEDICINE | Admitting: RADIOLOGY
Payer: COMMERCIAL

## 2021-05-24 DIAGNOSIS — K70.31 ALCOHOLIC CIRRHOSIS OF LIVER WITH ASCITES (H): ICD-10-CM

## 2021-05-24 DIAGNOSIS — R18.8 OTHER ASCITES: ICD-10-CM

## 2021-05-24 DIAGNOSIS — K76.82 HEPATIC ENCEPHALOPATHY (H): Primary | ICD-10-CM

## 2021-05-30 ENCOUNTER — AMBULATORY - HEALTHEAST (OUTPATIENT)
Dept: ULTRASOUND IMAGING | Facility: CLINIC | Age: 36
End: 2021-05-30

## 2021-05-30 DIAGNOSIS — Z11.59 ENCOUNTER FOR SCREENING FOR OTHER VIRAL DISEASES: ICD-10-CM

## 2021-06-01 ENCOUNTER — AMBULATORY - HEALTHEAST (OUTPATIENT)
Dept: LAB | Facility: CLINIC | Age: 36
End: 2021-06-01

## 2021-06-01 DIAGNOSIS — Z11.59 ENCOUNTER FOR SCREENING FOR OTHER VIRAL DISEASES: ICD-10-CM

## 2021-06-03 ENCOUNTER — COMMUNICATION - HEALTHEAST (OUTPATIENT)
Dept: SCHEDULING | Facility: CLINIC | Age: 36
End: 2021-06-03

## 2021-06-03 ENCOUNTER — HOSPITAL ENCOUNTER (OUTPATIENT)
Dept: ULTRASOUND IMAGING | Facility: CLINIC | Age: 36
Discharge: HOME | End: 2021-06-03
Attending: INTERNAL MEDICINE
Payer: COMMERCIAL

## 2021-06-03 DIAGNOSIS — K70.31 ASCITES DUE TO ALCOHOLIC CIRRHOSIS (H): ICD-10-CM

## 2021-06-08 NOTE — PRE-PROCEDURE
Pre-Procedure Negative COVID Test     Christo Herrera  COVID-19 PCR Results    COVID-19 PCR Results 6/1/20 2019-nCOV Not Detected      Comments are available for some flowsheets but are not being displayed.             Patient Notification  Patient notified of the negative COVID test result    Patient Information  Patient informed of the no visitor policy  Patient instructed to continue to self-quarantine prior to procedure  Patient informed to contact the ordering provider if the following symptoms develop prior to procedure:   Fever  Cough  Shortness of Breath  Sore throat   Runny or stuffy nose  Muscle or body aches  Headaches  Fatigue  Vomiting or diarrhea   Rash    Brittany Vail

## 2021-06-12 NOTE — SEDATION DOCUMENTATION
Patient tolerated paracentesis with albumin with no concerns expressed or observed.    5.5 L removed with 25 grams albumin given per order.

## 2021-06-13 NOTE — SEDATION DOCUMENTATION
Patient tolerated paracentesis with albumin with no concerns expressed or observed.    7. L removed with 50 grams albumin given per order.

## 2021-06-13 NOTE — SEDATION DOCUMENTATION
Patient tolerated paracentesis with albumin with no concerns expressed or observed.    8L L removed with 50 grams albumin given per order.

## 2021-06-14 ENCOUNTER — HOSPITAL ENCOUNTER (OUTPATIENT)
Dept: ULTRASOUND IMAGING | Facility: CLINIC | Age: 36
Discharge: HOME | End: 2021-06-14
Attending: INTERNAL MEDICINE | Admitting: RADIOLOGY
Payer: COMMERCIAL

## 2021-06-14 ENCOUNTER — HOSPITAL ENCOUNTER (OUTPATIENT)
Dept: ULTRASOUND IMAGING | Facility: CLINIC | Age: 36
Discharge: HOME | End: 2021-06-14
Attending: INTERNAL MEDICINE
Payer: COMMERCIAL

## 2021-06-14 DIAGNOSIS — R18.8 OTHER ASCITES: ICD-10-CM

## 2021-06-14 NOTE — SEDATION DOCUMENTATION
Patient tolerated paracentesis with albumin with no concerns expressed or observed.    8.1 L removed with 50 grams albumin given per order.

## 2021-06-14 NOTE — SEDATION DOCUMENTATION
Patient tolerated paracentesis with albumin with no concerns expressed or observed.    10.1 L removed with 75 grams albumin given per order.

## 2021-06-14 NOTE — SEDATION DOCUMENTATION
Patient tolerated paracentesis with albumin with no concerns expressed or observed.    10 L removed with 75 grams albumin given per order.

## 2021-06-15 NOTE — SEDATION DOCUMENTATION
Pt here for US paracentesis with therapeutic albumin. 9L off. 75g of 25% albumin given. Patient tolerated procedure and remains stable.

## 2021-06-15 NOTE — PROGRESS NOTES
Patient tolerated paracentesis with albumin with no concerns expressed or observed.    9.2 L removed with 75 grams albumin given per order.

## 2021-06-16 NOTE — PROGRESS NOTES
Patient tolerated paracentesis with albumin with no concerns expressed or observed.    7.8 L removed with 50 grams albumin given per order.

## 2021-06-16 NOTE — SEDATION DOCUMENTATION
Patient tolerated paracentesis with albumin with no concerns expressed or observed.    8.7 L removed with 50 grams albumin given per order.

## 2021-06-16 NOTE — PROGRESS NOTES
Paracentesis with albumin today. 8.4 L removed. Patient tolerated well. VSS Albumin 50mg x2  Given    Katherin Gomez RN

## 2021-06-16 NOTE — PROGRESS NOTES
Pt presents to [facility] for paracentesis with albumin. VSS.   6.5 liters pulled off. 50 g of 25% Albumin given.   Pt tolerated procedure well.  Pt discharged in stable condition.

## 2021-06-17 NOTE — PROGRESS NOTES
Patient tolerated paracentesis with albumin with no concerns expressed or observed.    7.5 L removed with 50 grams albumin given per order.

## 2021-06-17 NOTE — PROGRESS NOTES
Patient Bp 117/79 prior to paracentesis. 8.8L removed. 50grams albumin given per order. Bp following procedure 120/80. Patient tolerated procedure well. No concerns.    Katherin Gomez, RN

## 2021-06-17 NOTE — PROGRESS NOTES
Patient tolerated paracentesis with albumin with no concerns expressed or observed.    7 L removed with 50 grams albumin given per order.

## 2021-06-21 ENCOUNTER — AMBULATORY - HEALTHEAST (OUTPATIENT)
Dept: LAB | Facility: CLINIC | Age: 36
End: 2021-06-21

## 2021-06-21 DIAGNOSIS — Z11.59 ENCOUNTER FOR SCREENING FOR OTHER VIRAL DISEASES: ICD-10-CM

## 2021-06-22 ENCOUNTER — RECORDS - HEALTHEAST (OUTPATIENT)
Dept: SCHEDULING | Facility: CLINIC | Age: 36
End: 2021-06-22

## 2021-06-22 ENCOUNTER — RECORDS - HEALTHEAST (OUTPATIENT)
Dept: ADMINISTRATIVE | Facility: OTHER | Age: 36
End: 2021-06-22

## 2021-06-22 DIAGNOSIS — R18.8 OTHER ASCITES: ICD-10-CM

## 2021-06-23 ENCOUNTER — COMMUNICATION - HEALTHEAST (OUTPATIENT)
Dept: SCHEDULING | Facility: CLINIC | Age: 36
End: 2021-06-23

## 2021-06-25 ENCOUNTER — HOSPITAL ENCOUNTER (OUTPATIENT)
Dept: ULTRASOUND IMAGING | Facility: CLINIC | Age: 36
Discharge: HOME | End: 2021-06-25
Attending: INTERNAL MEDICINE
Payer: COMMERCIAL

## 2021-06-25 DIAGNOSIS — R18.8 OTHER ASCITES: ICD-10-CM

## 2021-06-25 NOTE — PROGRESS NOTES
Patient tolerated paracentesis with albumin with no concerns expressed or observed.  VSS throughout.  7.4 L removed with 50 grams albumin given per order.    Katherin Gomez, RN

## 2021-06-26 NOTE — PROGRESS NOTES
Patient tolerated paracentesis with albumin with no concerns expressed or observed.    9.2 L removed with 75 grams albumin given per order.   /79 prior to paracentesis, 108/71 during procedure, 102 66 post.    Katherin Gomez RN

## 2021-06-26 NOTE — PROGRESS NOTES
Patient tolerated paracentesis with albumin with no concerns expressed or observed.    8.3 L removed with 50 grams albumin given per order. /81, 110/73, and 96/73.

## 2021-07-02 DIAGNOSIS — Z11.59 ENCOUNTER FOR SCREENING FOR OTHER VIRAL DISEASES: Primary | ICD-10-CM

## 2021-07-04 NOTE — ADDENDUM NOTE
Addendum Note by Ana Al LPN at 4/4/2021 11:28 AM     Author: Ana Al LPN Service: -- Author Type: Licensed Nurse    Filed: 5/4/2021  1:22 PM Encounter Date: 4/4/2021 Status: Signed    : Ana Al LPN (Licensed Nurse)    Addended by: ANA AL on: 5/4/2021 01:22 PM        Modules accepted: Orders

## 2021-07-04 NOTE — ADDENDUM NOTE
Addendum Note by Monet Vogt LPN at 2/9/2021  8:57 AM     Author: Monet Vogt LPN Service: -- Author Type: Licensed Nurse    Filed: 2/11/2021  4:02 PM Encounter Date: 2/9/2021 Status: Signed    : Monet Vogt LPN (Licensed Nurse)    Addended by: MONET VOGT on: 2/11/2021 04:02 PM        Modules accepted: Orders

## 2021-07-05 ENCOUNTER — HOSPITAL ENCOUNTER (OUTPATIENT)
Dept: ULTRASOUND IMAGING | Facility: CLINIC | Age: 36
Discharge: HOME | End: 2021-07-05
Attending: INTERNAL MEDICINE
Payer: COMMERCIAL

## 2021-07-05 DIAGNOSIS — R18.8 OTHER ASCITES: ICD-10-CM

## 2021-07-05 NOTE — PROGRESS NOTES
Progress Notes by Jayjay Nieves RN at 7/5/2021  1:00 PM     Author: Jayjay Nieves RN Service: Interventional Radiology Author Type: Registered Nurse    Filed: 7/5/2021  1:57 PM Date of Service: 7/5/2021  1:00 PM Status: Signed    : Jayjay Nieves RN (Registered Nurse)       Patient tolerated paracentesis with albumin with no concerns expressed or observed.    6.7 L removed with 50 grams albumin given per order. /73 and 103/69.

## 2021-07-06 ENCOUNTER — OFFICE VISIT (OUTPATIENT)
Dept: GASTROENTEROLOGY | Facility: CLINIC | Age: 36
End: 2021-07-06
Attending: INTERNAL MEDICINE
Payer: COMMERCIAL

## 2021-07-06 ENCOUNTER — ANCILLARY PROCEDURE (OUTPATIENT)
Dept: ULTRASOUND IMAGING | Facility: CLINIC | Age: 36
End: 2021-07-06
Attending: INTERNAL MEDICINE
Payer: COMMERCIAL

## 2021-07-06 VITALS
SYSTOLIC BLOOD PRESSURE: 114 MMHG | DIASTOLIC BLOOD PRESSURE: 75 MMHG | HEART RATE: 72 BPM | OXYGEN SATURATION: 98 % | WEIGHT: 121.2 LBS | HEIGHT: 67 IN | BODY MASS INDEX: 19.02 KG/M2 | TEMPERATURE: 97.7 F

## 2021-07-06 DIAGNOSIS — K76.6 PORTAL HYPERTENSION (H): ICD-10-CM

## 2021-07-06 DIAGNOSIS — K70.31 ALCOHOLIC CIRRHOSIS OF LIVER WITH ASCITES (H): Primary | ICD-10-CM

## 2021-07-06 DIAGNOSIS — K70.10 ACUTE ALCOHOLIC LIVER DISEASE (H): ICD-10-CM

## 2021-07-06 PROCEDURE — 99215 OFFICE O/P EST HI 40 MIN: CPT | Performed by: INTERNAL MEDICINE

## 2021-07-06 PROCEDURE — G0463 HOSPITAL OUTPT CLINIC VISIT: HCPCS

## 2021-07-06 PROCEDURE — 76700 US EXAM ABDOM COMPLETE: CPT | Performed by: RADIOLOGY

## 2021-07-06 ASSESSMENT — PAIN SCALES - GENERAL: PAINLEVEL: NO PAIN (0)

## 2021-07-06 ASSESSMENT — MIFFLIN-ST. JEOR: SCORE: 1443.39

## 2021-07-06 NOTE — PROGRESS NOTES
[facility]    Hepatology follow-up    CHIEF COMPLAINT AND REASON FOR VISIT:  Alcoholic liver disease.    CONSULTING HEALTHCARE PROVIDER:  Luda Caraballo MD     SUBJECTIVE:  Mr. Herrera is a 36-year-old male with a past history of alcohol use disorder, who developed alcoholic hepatitis/alcohol-related cirrhosis.  He did decompensate as I have said in my previous dictations and developed significant ascites and some hepatic encephalopathy.  He also did have acute kidney injury and went on dialysis in 08/2020.    Although he did not bleed in the past, has also esophageal varices on endoscopy.  He has improved upon abstaining and in fact required less paracentesis, twice a month now and before was weekly.  He has some edema.  He is not jaundiced.  He is lucid and does not show any lethargy or confusion. He also does not have any gastrointestinal bleed like melena, hematemesis or hematochezia.      Please note, although the patient claims that he is exercising and maintained his weight, it appears to me that he has lost significant amount of muscle mass and we wanted him to be seen by nutritionist also.    Otherwise he did not have any infections.  He gets Xifaxan for his encephalopathy, and in fact, he was vaccinated with the Pfizer x2 doses.    Medical hx Surgical hx   Past Medical History:   Diagnosis Date     Alcoholic cirrhosis of liver with ascites (H) 3/10/2020     Ascites      ESRD (end stage renal disease) (H)      GERD (gastroesophageal reflux disease)      HE (hepatic encephalopathy) (H)      History of blood transfusion       Past Surgical History:   Procedure Laterality Date     COLONOSCOPY      [facility] 2020     GI SURGERY       US PARACENTESIS WITH ALBUMIN  4/2/2020     US PARACENTESIS WITH ALBUMIN  4/21/2020     US PARACENTESIS WITH ALBUMIN  6/4/2020     US PARACENTESIS WITH ALBUMIN  6/19/2020     US PARACENTESIS WITH ALBUMIN  7/29/2020     US PARACENTESIS WITH ALBUMIN   10/5/2020     US PARACENTESIS WITH ALBUMIN  11/3/2020     US PARACENTESIS WITH ALBUMIN  11/11/2020     US PARACENTESIS WITH ALBUMIN  11/24/2020     US PARACENTESIS WITH ALBUMIN  12/3/2020     US PARACENTESIS WITH ALBUMIN  12/11/2020     US PARACENTESIS WITH ALBUMIN  12/18/2020     US PARACENTESIS WITH ALBUMIN  12/30/2020     US PARACENTESIS WITH ALBUMIN  1/7/2021     US PARACENTESIS WITH ALBUMIN  1/18/2021     US PARACENTESIS WITH ALBUMIN  1/27/2021     US PARACENTESIS WITH ALBUMIN  2/5/2021     US PARACENTESIS WITH ALBUMIN  2/16/2021     US PARACENTESIS WITH ALBUMIN  2/26/2021     US PARACENTESIS WITH ALBUMIN  3/12/2021     US PARACENTESIS WITH ALBUMIN  3/22/2021     US PARACENTESIS WITH ALBUMIN  4/1/2021     US PARACENTESIS WITH ALBUMIN  4/12/2021     US PARACENTESIS WITH ALBUMIN  4/23/2021     US PARACENTESIS WITH ALBUMIN  5/4/2021     US PARACENTESIS WITH ALBUMIN  5/14/2021     US PARACENTESIS WITH ALBUMIN  5/24/2021     US PARACENTESIS WITH ALBUMIN  6/3/2021     US PARACENTESIS WITH ALBUMIN  6/14/2021     US PARACENTESIS WITH ALBUMIN  6/25/2021          Medications  Prior to Admission medications    Medication Sig Start Date End Date Taking? Authorizing Provider   gabapentin (NEURONTIN) 100 MG capsule Take 100 mg by mouth nightly as needed  8/11/20  Yes Reported, Patient   guaiFENesin (MUCINEX) 600 MG 12 hr tablet Take 600 mg by mouth 2 times daily as needed for congestion or cough  2/15/20  Yes Reported, Patient   lactulose (CHRONULAC) 10 GM/15ML solution Take 10 g by mouth daily as needed  2/15/20  Yes Reported, Patient   Menthol-Methyl Salicylate (ICY HOT EXTRA STRENGTH) 10-30 % CREA    Yes Reported, Patient   midodrine (PROAMATINE) 5 MG tablet Take 5 mg by mouth 3 times daily  2/15/20  Yes Reported, Patient   Multiple Vitamins-Minerals (SUPER THERA PRERNA M) TABS Take 1 tablet by mouth 12/29/19  Yes Reported, Patient   Psyllium 58.12 % PACK Take by mouth 2 times daily    Yes Reported, Patient   rifaximin  "(XIFAXAN) 550 MG TABS tablet Take 1 tablet (550 mg) by mouth 2 times daily 5/24/21  Yes Brandin Echavarria MD   Cholecalciferol (VITAMIN D HIGH POTENCY) 25 MCG (1000 UT) CAPS TAKE ONE CAPSULE BY MOUTH DAILY  Patient not taking: Reported on 10/14/2020 7/6/20   Brandin Echavarria MD   ciprofloxacin (CIPRO) 250 MG tablet Take one 250 mg tab every 24 hours AFTER dialysis for 7 days.  Patient not taking: Reported on 6/16/2020 3/16/20   Brandin Echavarria MD   FEROSUL 325 (65 Fe) MG tablet  7/5/20   Reported, Patient   levETIRAcetam (KEPPRA) 500 MG tablet  8/1/20   Reported, Patient   Misc. Devices (ROLLATOR ULTRA-LIGHT) MISC Walker with front wheels for home use. 1/27/20   Reported, Patient   OYSCO 500 + D 500-200 MG-UNIT tablet TAKE 1 TABLET BY MOUTH TWICE DAILY WITH MEALS  Patient not taking: Reported on 10/14/2020 7/8/20   Branidn Echavarria MD   pantoprazole (PROTONIX) 40 MG EC tablet Take 40 mg by mouth  8/11/20   Reported, Patient   torsemide (DEMADEX) 100 MG tablet Take 100 mg by mouth daily    Reported, Patient   vitamin B1 (VITAMIN B-1) 100 MG tablet Take 100 mg by mouth 3/9/20   Reported, Patient       Allergies  No Known Allergies    Review of systems  A 10-point review of systems was negative    Examination  /75   Pulse 72   Temp 97.7  F (36.5  C) (Oral)   Ht 1.702 m (5' 7\")   Wt 55 kg (121 lb 3.2 oz)   SpO2 98%   BMI 18.98 kg/m    Body mass index is 18.98 kg/m .    Gen- well, NAD, A+Ox3, normal color  Lym- no palpable LAD  CVS- RRR  RS- CTA  Abd- Not distended. Positive  shifting dullness.  Extr- hands normal,  Positive  LARISSA  Skin- no rash or jaundice but has striae rubra/stirae alba.  Neuro- no asterixis  Psych- normal mood    Laboratory  Lab Results   Component Value Date     01/12/2021    POTASSIUM 5.4 01/12/2021    CHLORIDE 108 01/12/2021    CO2 23 01/12/2021    BUN 48 01/12/2021    CR 1.39 01/12/2021       Lab Results   Component Value Date "    BILITOTAL 1.2 2021    ALT 32 2021    AST 43 2021    ALKPHOS 128 2021       Lab Results   Component Value Date    ALBUMIN 3.3 2021    PROTTOTAL 6.1 2021        Lab Results   Component Value Date    WBC 4.9 2021    HGB 13.4 2021    MCV 95 2021     2021       Lab Results   Component Value Date    INR 1.31 2021       Radiology    ASSESSMENT AND PLAN:  Alcoholic liver disease.  Mr. Herrera has alcoholic liver disease.  We did discuss with him a number of issues.  This includes:    1.  His main complaint, which is fluid retention.  He has ascites where he is having at least twice a month or every 2 weeks around 7 liters of fluid taken out and he would like that taken care of.  For that reason, we did discuss with him since his MELD score is low and now 15 on the labs of 2021 he could have a TIPS procedure, but talking with him about what TIPS can do and that it is not a destination therapy, but a bridge  therapy to transplantation.  He understood that and we told him that although currently he is not encephalopathic and is taking rifaximin he might develop more of it if TIPS  Is done.  So he will talk with his mom and he will get back to us.  If he decides that he will be interested in knowing more about it, which we already gave him how technically it is done and  side effects then we will make him meet with one of our IR who will explain to him more.  He can  then make a decision at that point.    2. Otherwise, he is currently listed for liver transplantation.  With that low MELD, he is not going to get a  donor liver transplantation, but we did explain to him as we have done multiple times that he could opt for a living donor liver transplantation.  He does not have a candidate, but we told him to continue talking with his friends and family.    3.  Besides that, he will need to be on surveillance for HCC and he will need an  upper endoscopy soon.     This was a 40-minute visit, of which more than 50% was spent in explaining to the patient what our plan of care was.  We also did a lot of care coordination both with IR and with the coordinator.      Brandin Echavarria MD  Hepatology  [facility]

## 2021-07-06 NOTE — NURSING NOTE
"Chief Complaint   Patient presents with     RECHECK     Acute alcoholic liver disease     /75   Pulse 72   Temp 97.7  F (36.5  C) (Oral)   Ht 1.702 m (5' 7\")   Wt 55 kg (121 lb 3.2 oz)   SpO2 98%   BMI 18.98 kg/m       Kev Ghotra MA  "

## 2021-07-06 NOTE — LETTER
7/6/2021         RE: Christo Herrera  [address]  [place] [address]      [facility]    Hepatology follow-up    CHIEF COMPLAINT AND REASON FOR VISIT:  Alcoholic liver disease.    CONSULTING HEALTHCARE PROVIDER:  Luda Caraballo MD     SUBJECTIVE:  Mr. Herrera is a 36-year-old male with a past history of alcohol use disorder, who developed alcoholic hepatitis/alcohol-related cirrhosis.  He did decompensate as I have said in my previous dictations and developed significant ascites and some hepatic encephalopathy.  He also did have acute kidney injury and went on dialysis in 08/2020.    Although he did not bleed in the past, has also esophageal varices on endoscopy.  He has improved upon abstaining and in fact required less paracentesis, twice a month now and before was weekly.  He has some edema.  He is not jaundiced.  He is lucid and does not show any lethargy or confusion. He also does not have any gastrointestinal bleed like melena, hematemesis or hematochezia.      Please note, although the patient claims that he is exercising and maintained his weight, it appears to me that he has lost significant amount of muscle mass and we wanted him to be seen by nutritionist also.    Otherwise he did not have any infections.  He gets Xifaxan for his encephalopathy, and in fact, he was vaccinated with the Pfizer x2 doses.    Medical hx Surgical hx   Past Medical History:   Diagnosis Date     Alcoholic cirrhosis of liver with ascites (H) 3/10/2020     Ascites      ESRD (end stage renal disease) (H)      GERD (gastroesophageal reflux disease)      HE (hepatic encephalopathy) (H)      History of blood transfusion       Past Surgical History:   Procedure Laterality Date     COLONOSCOPY      [facility] 2020     GI SURGERY       US PARACENTESIS WITH ALBUMIN  4/2/2020     US PARACENTESIS WITH ALBUMIN  4/21/2020     US PARACENTESIS WITH ALBUMIN  6/4/2020     US PARACENTESIS WITH ALBUMIN   6/19/2020     US PARACENTESIS WITH ALBUMIN  7/29/2020     US PARACENTESIS WITH ALBUMIN  10/5/2020     US PARACENTESIS WITH ALBUMIN  11/3/2020     US PARACENTESIS WITH ALBUMIN  11/11/2020     US PARACENTESIS WITH ALBUMIN  11/24/2020     US PARACENTESIS WITH ALBUMIN  12/3/2020     US PARACENTESIS WITH ALBUMIN  12/11/2020     US PARACENTESIS WITH ALBUMIN  12/18/2020     US PARACENTESIS WITH ALBUMIN  12/30/2020     US PARACENTESIS WITH ALBUMIN  1/7/2021     US PARACENTESIS WITH ALBUMIN  1/18/2021     US PARACENTESIS WITH ALBUMIN  1/27/2021     US PARACENTESIS WITH ALBUMIN  2/5/2021     US PARACENTESIS WITH ALBUMIN  2/16/2021     US PARACENTESIS WITH ALBUMIN  2/26/2021     US PARACENTESIS WITH ALBUMIN  3/12/2021     US PARACENTESIS WITH ALBUMIN  3/22/2021     US PARACENTESIS WITH ALBUMIN  4/1/2021     US PARACENTESIS WITH ALBUMIN  4/12/2021     US PARACENTESIS WITH ALBUMIN  4/23/2021     US PARACENTESIS WITH ALBUMIN  5/4/2021     US PARACENTESIS WITH ALBUMIN  5/14/2021     US PARACENTESIS WITH ALBUMIN  5/24/2021     US PARACENTESIS WITH ALBUMIN  6/3/2021     US PARACENTESIS WITH ALBUMIN  6/14/2021     US PARACENTESIS WITH ALBUMIN  6/25/2021          Medications  Prior to Admission medications    Medication Sig Start Date End Date Taking? Authorizing Provider   gabapentin (NEURONTIN) 100 MG capsule Take 100 mg by mouth nightly as needed  8/11/20  Yes Reported, Patient   guaiFENesin (MUCINEX) 600 MG 12 hr tablet Take 600 mg by mouth 2 times daily as needed for congestion or cough  2/15/20  Yes Reported, Patient   lactulose (CHRONULAC) 10 GM/15ML solution Take 10 g by mouth daily as needed  2/15/20  Yes Reported, Patient   Menthol-Methyl Salicylate (ICY HOT EXTRA STRENGTH) 10-30 % CREA    Yes Reported, Patient   midodrine (PROAMATINE) 5 MG tablet Take 5 mg by mouth 3 times daily  2/15/20  Yes Reported, Patient   Multiple Vitamins-Minerals (SUPER THERA PRERNA M) TABS Take 1 tablet by mouth 12/29/19  Yes Reported, Patient  "  Psyllium 58.12 % PACK Take by mouth 2 times daily    Yes Reported, Patient   rifaximin (XIFAXAN) 550 MG TABS tablet Take 1 tablet (550 mg) by mouth 2 times daily 5/24/21  Yes Brandin Echavarria MD   Cholecalciferol (VITAMIN D HIGH POTENCY) 25 MCG (1000 UT) CAPS TAKE ONE CAPSULE BY MOUTH DAILY  Patient not taking: Reported on 10/14/2020 7/6/20   Brandin Echavarria MD   ciprofloxacin (CIPRO) 250 MG tablet Take one 250 mg tab every 24 hours AFTER dialysis for 7 days.  Patient not taking: Reported on 6/16/2020 3/16/20   Brandin Echavarria MD   FEROSUL 325 (65 Fe) MG tablet  7/5/20   Reported, Patient   levETIRAcetam (KEPPRA) 500 MG tablet  8/1/20   Reported, Patient   Misc. Devices (ROLLATOR ULTRA-LIGHT) MISC Walker with front wheels for home use. 1/27/20   Reported, Patient   OYSCO 500 + D 500-200 MG-UNIT tablet TAKE 1 TABLET BY MOUTH TWICE DAILY WITH MEALS  Patient not taking: Reported on 10/14/2020 7/8/20   Brandin Echavarria MD   pantoprazole (PROTONIX) 40 MG EC tablet Take 40 mg by mouth  8/11/20   Reported, Patient   torsemide (DEMADEX) 100 MG tablet Take 100 mg by mouth daily    Reported, Patient   vitamin B1 (VITAMIN B-1) 100 MG tablet Take 100 mg by mouth 3/9/20   Reported, Patient       Allergies  No Known Allergies    Review of systems  A 10-point review of systems was negative    Examination  /75   Pulse 72   Temp 97.7  F (36.5  C) (Oral)   Ht 1.702 m (5' 7\")   Wt 55 kg (121 lb 3.2 oz)   SpO2 98%   BMI 18.98 kg/m    Body mass index is 18.98 kg/m .    Gen- well, NAD, A+Ox3, normal color  Lym- no palpable LAD  CVS- RRR  RS- CTA  Abd- Not distended. Positive  shifting dullness.  Extr- hands normal,  Positive  LARISSA  Skin- no rash or jaundice but has striae rubra/stirae alba.  Neuro- no asterixis  Psych- normal mood    Laboratory  Lab Results   Component Value Date     01/12/2021    POTASSIUM 5.4 01/12/2021    CHLORIDE 108 01/12/2021    CO2 23 " 2021    BUN 48 2021    CR 1.39 2021       Lab Results   Component Value Date    BILITOTAL 1.2 2021    ALT 32 2021    AST 43 2021    ALKPHOS 128 2021       Lab Results   Component Value Date    ALBUMIN 3.3 2021    PROTTOTAL 6.1 2021        Lab Results   Component Value Date    WBC 4.9 2021    HGB 13.4 2021    MCV 95 2021     2021       Lab Results   Component Value Date    INR 1.31 2021       Radiology    ASSESSMENT AND PLAN:  Alcoholic liver disease.  Mr. Herrera has alcoholic liver disease.  We did discuss with him a number of issues.  This includes:    1.  His main complaint, which is fluid retention.  He has ascites where he is having at least twice a month or every 2 weeks around 7 liters of fluid taken out and he would like that taken care of.  For that reason, we did discuss with him since his MELD score is low and now 15 on the labs of 2021 he could have a TIPS procedure, but talking with him about what TIPS can do and that it is not a destination therapy, but a bridge  therapy to transplantation.  He understood that and we told him that although currently he is not encephalopathic and is taking rifaximin he might develop more of it if TIPS  Is done.  So he will talk with his mom and he will get back to us.  If he decides that he will be interested in knowing more about it, which we already gave him how technically it is done and  side effects then we will make him meet with one of our IR who will explain to him more.  He can  then make a decision at that point.    2. Otherwise, he is currently listed for liver transplantation.  With that low MELD, he is not going to get a  donor liver transplantation, but we did explain to him as we have done multiple times that he could opt for a living donor liver transplantation.  He does not have a candidate, but we told him to continue talking with his friends and  family.    3.  Besides that, he will need to be on surveillance for HCC and he will need an upper endoscopy soon.     This was a 40-minute visit, of which more than 50% was spent in explaining to the patient what our plan of care was.  We also did a lot of care coordination both with IR and with the coordinator.      Brandin Echavarria MD  Hepatology  [facility]        Brandin Echavarria MD

## 2021-07-06 NOTE — LETTER
7/6/2021         RE: Christo Herrera  [address] [place] [address]        Dear Colleague,    Thank you for referring your patient, Christo Herrera, to the [facility] [place]. Please see a copy of my visit note below.    [facility]    Hepatology follow-up    CHIEF COMPLAINT AND REASON FOR VISIT:  Alcoholic liver disease.    CONSULTING HEALTHCARE PROVIDER:  Luda Caraballo MD     SUBJECTIVE:  Mr. Herrera is a 36-year-old male with a past history of alcohol use disorder, who developed alcoholic hepatitis/alcohol-related cirrhosis.  He did decompensate as I have said in my previous dictations and developed significant ascites and some hepatic encephalopathy.  He also did have acute kidney injury and went on dialysis in 08/2020.    Although he did not bleed in the past, has also esophageal varices on endoscopy.  He has improved upon abstaining and in fact required less paracentesis, twice a month now and before was weekly.  He has some edema.  He is not jaundiced.  He is lucid and does not show any lethargy or confusion. He also does not have any gastrointestinal bleed like melena, hematemesis or hematochezia.      Please note, although the patient claims that he is exercising and maintained his weight, it appears to me that he has lost significant amount of muscle mass and we wanted him to be seen by nutritionist also.    Otherwise he did not have any infections.  He gets Xifaxan for his encephalopathy, and in fact, he was vaccinated with the Pfizer x2 doses.    Medical hx Surgical hx   Past Medical History:   Diagnosis Date     Alcoholic cirrhosis of liver with ascites (H) 3/10/2020     Ascites      ESRD (end stage renal disease) (H)      GERD (gastroesophageal reflux disease)      HE (hepatic encephalopathy) (H)      History of blood transfusion       Past Surgical History:   Procedure Laterality Date     COLONOSCOPY      [facility] 2020      GI SURGERY       US PARACENTESIS WITH ALBUMIN  4/2/2020     US PARACENTESIS WITH ALBUMIN  4/21/2020     US PARACENTESIS WITH ALBUMIN  6/4/2020     US PARACENTESIS WITH ALBUMIN  6/19/2020     US PARACENTESIS WITH ALBUMIN  7/29/2020     US PARACENTESIS WITH ALBUMIN  10/5/2020     US PARACENTESIS WITH ALBUMIN  11/3/2020     US PARACENTESIS WITH ALBUMIN  11/11/2020     US PARACENTESIS WITH ALBUMIN  11/24/2020     US PARACENTESIS WITH ALBUMIN  12/3/2020     US PARACENTESIS WITH ALBUMIN  12/11/2020     US PARACENTESIS WITH ALBUMIN  12/18/2020     US PARACENTESIS WITH ALBUMIN  12/30/2020     US PARACENTESIS WITH ALBUMIN  1/7/2021     US PARACENTESIS WITH ALBUMIN  1/18/2021     US PARACENTESIS WITH ALBUMIN  1/27/2021     US PARACENTESIS WITH ALBUMIN  2/5/2021     US PARACENTESIS WITH ALBUMIN  2/16/2021     US PARACENTESIS WITH ALBUMIN  2/26/2021     US PARACENTESIS WITH ALBUMIN  3/12/2021     US PARACENTESIS WITH ALBUMIN  3/22/2021     US PARACENTESIS WITH ALBUMIN  4/1/2021     US PARACENTESIS WITH ALBUMIN  4/12/2021     US PARACENTESIS WITH ALBUMIN  4/23/2021     US PARACENTESIS WITH ALBUMIN  5/4/2021     US PARACENTESIS WITH ALBUMIN  5/14/2021     US PARACENTESIS WITH ALBUMIN  5/24/2021     US PARACENTESIS WITH ALBUMIN  6/3/2021     US PARACENTESIS WITH ALBUMIN  6/14/2021     US PARACENTESIS WITH ALBUMIN  6/25/2021          Medications  Prior to Admission medications    Medication Sig Start Date End Date Taking? Authorizing Provider   gabapentin (NEURONTIN) 100 MG capsule Take 100 mg by mouth nightly as needed  8/11/20  Yes Reported, Patient   guaiFENesin (MUCINEX) 600 MG 12 hr tablet Take 600 mg by mouth 2 times daily as needed for congestion or cough  2/15/20  Yes Reported, Patient   lactulose (CHRONULAC) 10 GM/15ML solution Take 10 g by mouth daily as needed  2/15/20  Yes Reported, Patient   Menthol-Methyl Salicylate (ICY HOT EXTRA STRENGTH) 10-30 % CREA    Yes Reported, Patient   midodrine (PROAMATINE) 5 MG tablet  "Take 5 mg by mouth 3 times daily  2/15/20  Yes Reported, Patient   Multiple Vitamins-Minerals (SUPER THERA PRERNA M) TABS Take 1 tablet by mouth 12/29/19  Yes Reported, Patient   Psyllium 58.12 % PACK Take by mouth 2 times daily    Yes Reported, Patient   rifaximin (XIFAXAN) 550 MG TABS tablet Take 1 tablet (550 mg) by mouth 2 times daily 5/24/21  Yes Brandin Echavarria MD   Cholecalciferol (VITAMIN D HIGH POTENCY) 25 MCG (1000 UT) CAPS TAKE ONE CAPSULE BY MOUTH DAILY  Patient not taking: Reported on 10/14/2020 7/6/20   Brandin Echavarria MD   ciprofloxacin (CIPRO) 250 MG tablet Take one 250 mg tab every 24 hours AFTER dialysis for 7 days.  Patient not taking: Reported on 6/16/2020 3/16/20   Brandin Echavarria MD   FEROSUL 325 (65 Fe) MG tablet  7/5/20   Reported, Patient   levETIRAcetam (KEPPRA) 500 MG tablet  8/1/20   Reported, Patient   Misc. Devices (ROLLATOR ULTRA-LIGHT) MISC Walker with front wheels for home use. 1/27/20   Reported, Patient   OYSCO 500 + D 500-200 MG-UNIT tablet TAKE 1 TABLET BY MOUTH TWICE DAILY WITH MEALS  Patient not taking: Reported on 10/14/2020 7/8/20   Brandin Echavarria MD   pantoprazole (PROTONIX) 40 MG EC tablet Take 40 mg by mouth  8/11/20   Reported, Patient   torsemide (DEMADEX) 100 MG tablet Take 100 mg by mouth daily    Reported, Patient   vitamin B1 (VITAMIN B-1) 100 MG tablet Take 100 mg by mouth 3/9/20   Reported, Patient       Allergies  No Known Allergies    Review of systems  A 10-point review of systems was negative    Examination  /75   Pulse 72   Temp 97.7  F (36.5  C) (Oral)   Ht 1.702 m (5' 7\")   Wt 55 kg (121 lb 3.2 oz)   SpO2 98%   BMI 18.98 kg/m    Body mass index is 18.98 kg/m .    Gen- well, NAD, A+Ox3, normal color  Lym- no palpable LAD  CVS- RRR  RS- CTA  Abd- Not distended. Positive  shifting dullness.  Extr- hands normal,  Positive  LARISSA  Skin- no rash or jaundice but has striae rubra/stirae alba.  Neuro- " no asterixis  Psych- normal mood    Laboratory  Lab Results   Component Value Date     2021    POTASSIUM 5.4 2021    CHLORIDE 108 2021    CO2 23 2021    BUN 48 2021    CR 1.39 2021       Lab Results   Component Value Date    BILITOTAL 1.2 2021    ALT 32 2021    AST 43 2021    ALKPHOS 128 2021       Lab Results   Component Value Date    ALBUMIN 3.3 2021    PROTTOTAL 6.1 2021        Lab Results   Component Value Date    WBC 4.9 2021    HGB 13.4 2021    MCV 95 2021     2021       Lab Results   Component Value Date    INR 1.31 2021       Radiology    ASSESSMENT AND PLAN:  Alcoholic liver disease.  Mr. Herrera has alcoholic liver disease.  We did discuss with him a number of issues.  This includes:    1.  His main complaint, which is fluid retention.  He has ascites where he is having at least twice a month or every 2 weeks around 7 liters of fluid taken out and he would like that taken care of.  For that reason, we did discuss with him since his MELD score is low and now 15 on the labs of 2021 he could have a TIPS procedure, but talking with him about what TIPS can do and that it is not a destination therapy, but a bridge  therapy to transplantation.  He understood that and we told him that although currently he is not encephalopathic and is taking rifaximin he might develop more of it if TIPS  Is done.  So he will talk with his mom and he will get back to us.  If he decides that he will be interested in knowing more about it, which we already gave him how technically it is done and  side effects then we will make him meet with one of our IR who will explain to him more.  He can  then make a decision at that point.    2. Otherwise, he is currently listed for liver transplantation.  With that low MELD, he is not going to get a  donor liver transplantation, but we did explain to him as we have  done multiple times that he could opt for a living donor liver transplantation.  He does not have a candidate, but we told him to continue talking with his friends and family.    3.  Besides that, he will need to be on surveillance for HCC and he will need an upper endoscopy soon.     This was a 40-minute visit, of which more than 50% was spent in explaining to the patient what our plan of care was.  We also did a lot of care coordination both with IR and with the coordinator.      Brandin Echavarria MD  Hepatology  [facility]      Again, thank you for allowing me to participate in the care of your patient.        Sincerely,        Brandin Echavarria MD

## 2021-07-12 ENCOUNTER — LAB (OUTPATIENT)
Dept: LAB | Facility: CLINIC | Age: 36
End: 2021-07-12

## 2021-07-12 DIAGNOSIS — Z11.59 ENCOUNTER FOR SCREENING FOR OTHER VIRAL DISEASES: ICD-10-CM

## 2021-07-12 PROCEDURE — U0003 INFECTIOUS AGENT DETECTION BY NUCLEIC ACID (DNA OR RNA); SEVERE ACUTE RESPIRATORY SYNDROME CORONAVIRUS 2 (SARS-COV-2) (CORONAVIRUS DISEASE [COVID-19]), AMPLIFIED PROBE TECHNIQUE, MAKING USE OF HIGH THROUGHPUT TECHNOLOGIES AS DESCRIBED BY CMS-2020-01-R: HCPCS

## 2021-07-12 PROCEDURE — U0005 INFEC AGEN DETEC AMPLI PROBE: HCPCS

## 2021-07-13 LAB — SARS-COV-2 RNA RESP QL NAA+PROBE: NEGATIVE

## 2021-07-16 ENCOUNTER — HOSPITAL ENCOUNTER (OUTPATIENT)
Dept: ULTRASOUND IMAGING | Facility: CLINIC | Age: 36
Discharge: HOME | End: 2021-07-16
Attending: INTERNAL MEDICINE | Admitting: RADIOLOGY
Payer: COMMERCIAL

## 2021-07-16 VITALS — SYSTOLIC BLOOD PRESSURE: 95 MMHG | DIASTOLIC BLOOD PRESSURE: 59 MMHG

## 2021-07-16 DIAGNOSIS — R18.8 OTHER ASCITES: ICD-10-CM

## 2021-07-16 PROCEDURE — 49083 ABD PARACENTESIS W/IMAGING: CPT

## 2021-07-16 PROCEDURE — 999N000127 HC STATISTIC PERIPHERAL IV START W US GUIDANCE

## 2021-07-16 RX ORDER — ALBUMIN (HUMAN) 12.5 G/50ML
25-75 SOLUTION INTRAVENOUS ONCE
Status: COMPLETED | OUTPATIENT
Start: 2021-07-16 | End: 2021-07-16

## 2021-07-16 RX ADMIN — ALBUMIN (HUMAN) 50 G: 12.5 SOLUTION INTRAVENOUS at 13:52

## 2021-07-16 NOTE — PROGRESS NOTES
Patient tolerated paracentesis with albumin with no concerns expressed or observed.    7.4 L removed with 50 grams albumin given per order.  See doc flowsheet for additional vitals.

## 2021-07-26 ENCOUNTER — HOSPITAL ENCOUNTER (OUTPATIENT)
Dept: ULTRASOUND IMAGING | Facility: CLINIC | Age: 36
Discharge: HOME | End: 2021-07-26
Attending: INTERNAL MEDICINE | Admitting: INTERNAL MEDICINE
Payer: COMMERCIAL

## 2021-07-26 DIAGNOSIS — R18.8 OTHER ASCITES: ICD-10-CM

## 2021-07-26 DIAGNOSIS — K70.31 ALCOHOLIC CIRRHOSIS OF LIVER WITH ASCITES (H): Primary | ICD-10-CM

## 2021-07-26 PROCEDURE — 49083 ABD PARACENTESIS W/IMAGING: CPT

## 2021-07-26 RX ORDER — ALBUMIN (HUMAN) 12.5 G/50ML
25 SOLUTION INTRAVENOUS ONCE
Status: COMPLETED | OUTPATIENT
Start: 2021-07-26 | End: 2021-07-26

## 2021-07-26 RX ADMIN — ALBUMIN (HUMAN) 25 G: 12.5 SOLUTION INTRAVENOUS at 14:07

## 2021-07-26 RX ADMIN — ALBUMIN (HUMAN) 25 G: 12.5 SOLUTION INTRAVENOUS at 14:14

## 2021-07-26 NOTE — PROGRESS NOTES
Patient tolerated paracentesis with albumin with no concerns expressed or observed.    8 L removed with 50 grams albumin given per order. BP stable 111/67    Katherin Gomez, RN

## 2021-07-28 DIAGNOSIS — K70.31 ALCOHOLIC CIRRHOSIS OF LIVER WITH ASCITES (H): Primary | ICD-10-CM

## 2021-07-30 PROBLEM — K74.60 CIRRHOSIS OF LIVER WITH ASCITES (H): Status: ACTIVE | Noted: 2020-06-22

## 2021-07-30 PROBLEM — R56.9 SEIZURES (H): Status: ACTIVE | Noted: 2020-06-24

## 2021-07-30 PROBLEM — I31.39 PERICARDIAL EFFUSION: Status: ACTIVE | Noted: 2020-06-22

## 2021-07-30 PROBLEM — I24.89 DEMAND ISCHEMIA (H): Status: ACTIVE | Noted: 2020-06-22

## 2021-07-30 PROBLEM — N17.9 ACUTE RENAL FAILURE (H): Status: ACTIVE | Noted: 2021-07-30

## 2021-07-30 PROBLEM — E87.5 HYPERKALEMIA: Status: ACTIVE | Noted: 2020-06-22

## 2021-07-30 PROBLEM — R18.8 CIRRHOSIS OF LIVER WITH ASCITES (H): Status: ACTIVE | Noted: 2020-06-22

## 2021-07-30 PROBLEM — E87.20 METABOLIC ACIDOSIS: Status: ACTIVE | Noted: 2020-06-22

## 2021-07-30 PROBLEM — N18.30 STAGE 3 CHRONIC KIDNEY DISEASE (H): Status: ACTIVE | Noted: 2020-09-17

## 2021-07-30 PROBLEM — I10 ESSENTIAL (PRIMARY) HYPERTENSION: Status: ACTIVE | Noted: 2020-09-17

## 2021-08-02 ENCOUNTER — LAB (OUTPATIENT)
Dept: LAB | Facility: CLINIC | Age: 36
End: 2021-08-02
Payer: COMMERCIAL

## 2021-08-02 DIAGNOSIS — Z11.59 ENCOUNTER FOR SCREENING FOR OTHER VIRAL DISEASES: ICD-10-CM

## 2021-08-02 PROCEDURE — U0003 INFECTIOUS AGENT DETECTION BY NUCLEIC ACID (DNA OR RNA); SEVERE ACUTE RESPIRATORY SYNDROME CORONAVIRUS 2 (SARS-COV-2) (CORONAVIRUS DISEASE [COVID-19]), AMPLIFIED PROBE TECHNIQUE, MAKING USE OF HIGH THROUGHPUT TECHNOLOGIES AS DESCRIBED BY CMS-2020-01-R: HCPCS

## 2021-08-02 PROCEDURE — U0005 INFEC AGEN DETEC AMPLI PROBE: HCPCS

## 2021-08-03 LAB — SARS-COV-2 RNA RESP QL NAA+PROBE: NEGATIVE

## 2021-08-04 ENCOUNTER — OFFICE VISIT (OUTPATIENT)
Dept: DERMATOLOGY | Facility: CLINIC | Age: 36
End: 2021-08-04
Attending: RADIOLOGY
Payer: COMMERCIAL

## 2021-08-04 ENCOUNTER — LAB (OUTPATIENT)
Dept: LAB | Facility: CLINIC | Age: 36
End: 2021-08-04
Attending: RADIOLOGY
Payer: COMMERCIAL

## 2021-08-04 ENCOUNTER — HOSPITAL ENCOUNTER (OUTPATIENT)
Dept: CT IMAGING | Facility: CLINIC | Age: 36
End: 2021-08-04
Attending: RADIOLOGY
Payer: COMMERCIAL

## 2021-08-04 VITALS
HEART RATE: 80 BPM | SYSTOLIC BLOOD PRESSURE: 132 MMHG | DIASTOLIC BLOOD PRESSURE: 86 MMHG | WEIGHT: 121.25 LBS | BODY MASS INDEX: 18.99 KG/M2

## 2021-08-04 DIAGNOSIS — K70.31 ALCOHOLIC CIRRHOSIS OF LIVER WITH ASCITES (H): ICD-10-CM

## 2021-08-04 DIAGNOSIS — K70.31 ALCOHOLIC CIRRHOSIS OF LIVER WITH ASCITES (H): Primary | ICD-10-CM

## 2021-08-04 LAB
ALBUMIN SERPL-MCNC: 3.4 G/DL (ref 3.4–5)
ALP SERPL-CCNC: 154 U/L (ref 40–150)
ALT SERPL W P-5'-P-CCNC: 41 U/L (ref 0–70)
ANION GAP SERPL CALCULATED.3IONS-SCNC: 7 MMOL/L (ref 3–14)
AST SERPL W P-5'-P-CCNC: 49 U/L (ref 0–45)
BILIRUB DIRECT SERPL-MCNC: 0.6 MG/DL (ref 0–0.2)
BILIRUB SERPL-MCNC: 1.3 MG/DL (ref 0.2–1.3)
BUN SERPL-MCNC: 49 MG/DL (ref 7–30)
CALCIUM SERPL-MCNC: 8.5 MG/DL (ref 8.5–10.1)
CHLORIDE BLD-SCNC: 108 MMOL/L (ref 94–109)
CO2 SERPL-SCNC: 18 MMOL/L (ref 20–32)
CREAT SERPL-MCNC: 1.43 MG/DL (ref 0.66–1.25)
ERYTHROCYTE [DISTWIDTH] IN BLOOD BY AUTOMATED COUNT: 14.6 % (ref 10–15)
GFR SERPL CREATININE-BSD FRML MDRD: 63 ML/MIN/1.73M2
GLUCOSE BLD-MCNC: 97 MG/DL (ref 70–99)
HCT VFR BLD AUTO: 46.9 % (ref 40–53)
HGB BLD-MCNC: 15.5 G/DL (ref 13.3–17.7)
INR PPP: 1.14 (ref 0.86–1.14)
MCH RBC QN AUTO: 31 PG (ref 26.5–33)
MCHC RBC AUTO-ENTMCNC: 33 G/DL (ref 31.5–36.5)
MCV RBC AUTO: 94 FL (ref 78–100)
PLATELET # BLD AUTO: 112 10E3/UL (ref 150–450)
POTASSIUM BLD-SCNC: 4.9 MMOL/L (ref 3.4–5.3)
PROT SERPL-MCNC: 6.3 G/DL (ref 6.8–8.8)
RBC # BLD AUTO: 5 10E6/UL (ref 4.4–5.9)
SODIUM SERPL-SCNC: 133 MMOL/L (ref 133–144)
WBC # BLD AUTO: 6 10E3/UL (ref 4–11)

## 2021-08-04 PROCEDURE — 99203 OFFICE O/P NEW LOW 30 MIN: CPT | Performed by: RADIOLOGY

## 2021-08-04 PROCEDURE — 80321 ALCOHOLS BIOMARKERS 1OR 2: CPT

## 2021-08-04 PROCEDURE — 250N000009 HC RX 250: Performed by: RADIOLOGY

## 2021-08-04 PROCEDURE — 80048 BASIC METABOLIC PNL TOTAL CA: CPT

## 2021-08-04 PROCEDURE — 85027 COMPLETE CBC AUTOMATED: CPT

## 2021-08-04 PROCEDURE — 85610 PROTHROMBIN TIME: CPT

## 2021-08-04 PROCEDURE — 82248 BILIRUBIN DIRECT: CPT

## 2021-08-04 PROCEDURE — 82040 ASSAY OF SERUM ALBUMIN: CPT

## 2021-08-04 PROCEDURE — G0463 HOSPITAL OUTPT CLINIC VISIT: HCPCS | Mod: 25

## 2021-08-04 PROCEDURE — 74177 CT ABD & PELVIS W/CONTRAST: CPT | Mod: 26 | Performed by: RADIOLOGY

## 2021-08-04 PROCEDURE — 74177 CT ABD & PELVIS W/CONTRAST: CPT

## 2021-08-04 PROCEDURE — 250N000011 HC RX IP 250 OP 636: Performed by: RADIOLOGY

## 2021-08-04 PROCEDURE — 36415 COLL VENOUS BLD VENIPUNCTURE: CPT

## 2021-08-04 RX ORDER — IOPAMIDOL 755 MG/ML
100 INJECTION, SOLUTION INTRAVASCULAR ONCE
Status: COMPLETED | OUTPATIENT
Start: 2021-08-04 | End: 2021-08-04

## 2021-08-04 RX ADMIN — SODIUM CHLORIDE 55 ML: 9 INJECTION, SOLUTION INTRAVENOUS at 10:30

## 2021-08-04 RX ADMIN — IOPAMIDOL 59 ML: 755 INJECTION, SOLUTION INTRAVENOUS at 10:26

## 2021-08-04 NOTE — NURSING NOTE
"[facility] [[id number]]  Chief Complaint   Patient presents with     Consult     New consult     Initial /86 (BP Location: Right arm, Cuff Size: Infant)   Pulse 80   Wt 121 lb 4.1 oz (55 kg)   BMI 18.99 kg/m   Estimated body mass index is 18.99 kg/m  as calculated from the following:    Height as of 7/6/21: 5' 7\" (170.2 cm).    Weight as of this encounter: 121 lb 4.1 oz (55 kg).  Medication Reconciliation: complete  "

## 2021-08-04 NOTE — PROGRESS NOTES
INTERVENTIONAL RADIOLOGY CONSULTATION    Name: Christo Herrera  Age: 36 year old   Referring Physician: Dr. Lopez Valadez, Dr. Echavarria  REASON FOR REFERRAL:  Portal hypertension, refractory ascites    HPI:   Mr. Herrera is seen in person in clinic today to discuss treatment options for portal hypertension.  He is accompanied by his mother.    In summary, he is a 36-year-old male with alcohol induced cirrhosis.  His diagnosis was in the context of acute alcohol induced hepatitis in November to December 2019.  At that time, he was jaundiced, and had large volume ascites.  His work-up included an EGD that demonstrated some varic esophageal varices.  Since his diagnosis, he is abstain from alcohol successfully.  He states that his last alcohol use was immediately prior to his hospitalization in November 2019.    Since his diagnosis, he has struggled with ongoing ascites.  Although the volume had decreased somewhat since his original diagnosis, he is requiring every other week paracentesis, removing approximately 7 L each time.  He has never had spontaneous bacterial peritonitis today this.    No history of encephalopathy, jaundice, acute liver failure.  He only has trivial leg swelling.    He had 1 episode of GI bleeding secondary to hemorrhoid.  No intervention required.    No chest pain. He has some mild dyspnea he contributes to his abdominal fluid.  He is able to walk several miles without fatiguing.  He also rides a stationary bike for approximately 3 minutes before requiring a break.    The contents of context of his initial liver disease diagnosis at the end of 2019, he did require dialysis for short period of time.  He has not required dialysis since early 2020.    He does have restless legs.    With regards to the remainder of his work-up, he had a normal stress echo in July 2020, which also mentioned no valve abnormalities, and normal right and left heart function.    PAST MEDICAL HISTORY:   Past  Medical History:   Diagnosis Date     Alcoholic cirrhosis of liver with ascites (H) 3/10/2020     Ascites      ESRD (end stage renal disease) (H)      Essential (primary) hypertension 9/17/2020     GERD (gastroesophageal reflux disease)      HE (hepatic encephalopathy) (H)      History of blood transfusion        PAST SURGICAL HISTORY:   Past Surgical History:   Procedure Laterality Date     COLONOSCOPY      [facility] 2020     GI SURGERY       US PARACENTESIS WITH ALBUMIN  4/2/2020     US PARACENTESIS WITH ALBUMIN  4/21/2020     US PARACENTESIS WITH ALBUMIN  6/4/2020     US PARACENTESIS WITH ALBUMIN  6/19/2020     US PARACENTESIS WITH ALBUMIN  7/29/2020     US PARACENTESIS WITH ALBUMIN  10/5/2020     US PARACENTESIS WITH ALBUMIN  11/3/2020     US PARACENTESIS WITH ALBUMIN  11/11/2020     US PARACENTESIS WITH ALBUMIN  11/24/2020     US PARACENTESIS WITH ALBUMIN  12/3/2020     US PARACENTESIS WITH ALBUMIN  12/11/2020     US PARACENTESIS WITH ALBUMIN  12/18/2020     US PARACENTESIS WITH ALBUMIN  12/30/2020     US PARACENTESIS WITH ALBUMIN  1/7/2021     US PARACENTESIS WITH ALBUMIN  1/18/2021     US PARACENTESIS WITH ALBUMIN  1/27/2021     US PARACENTESIS WITH ALBUMIN  2/5/2021     US PARACENTESIS WITH ALBUMIN  2/16/2021     US PARACENTESIS WITH ALBUMIN  2/26/2021     US PARACENTESIS WITH ALBUMIN  3/12/2021     US PARACENTESIS WITH ALBUMIN  3/22/2021     US PARACENTESIS WITH ALBUMIN  4/1/2021     US PARACENTESIS WITH ALBUMIN  4/12/2021     US PARACENTESIS WITH ALBUMIN  4/23/2021     US PARACENTESIS WITH ALBUMIN  5/4/2021     US PARACENTESIS WITH ALBUMIN  5/14/2021     US PARACENTESIS WITH ALBUMIN  5/24/2021     US PARACENTESIS WITH ALBUMIN  6/3/2021     US PARACENTESIS WITH ALBUMIN  6/14/2021     US PARACENTESIS WITH ALBUMIN  6/25/2021     US PARACENTESIS WITH ALBUMIN  7/5/2021       FAMILY HISTORY:   Family History   Problem Relation Age of Onset     Coronary Artery Disease Father      Hypertension Maternal  Grandfather        SOCIAL HISTORY:   Social History     Tobacco Use     Smoking status: Never Smoker     Smokeless tobacco: Never Used   Substance Use Topics     Alcohol use: Not Currently     Comment: Quit 11/2019       PROBLEM LIST:   Patient Active Problem List    Diagnosis Date Noted     Acute renal failure (H) 07/30/2021     Priority: Medium     Essential (primary) hypertension 09/17/2020     Priority: Medium     Stage 3 chronic kidney disease 09/17/2020     Priority: Medium     Adjustment disorder, unspecified type 07/20/2020     Priority: Medium     Alcohol use disorder, severe, in early remission (H) 07/20/2020     Priority: Medium     Seizures (H) 06/24/2020     Priority: Medium     Cirrhosis of liver with ascites (H) 06/22/2020     Priority: Medium     Demand ischemia (H) 06/22/2020     Priority: Medium     Hyperkalemia 06/22/2020     Priority: Medium     Metabolic acidosis 06/22/2020     Priority: Medium     Pericardial effusion 06/22/2020     Priority: Medium     Alcoholic cirrhosis of liver with ascites (H) 03/10/2020     Priority: Medium     Dyslipidemia 02/26/2020     Priority: Medium     Plantar fasciitis 02/26/2020     Priority: Medium     Tobacco use disorder 02/26/2020     Priority: Medium     Vitamin D deficiency 02/26/2020     Priority: Medium     Hypotension 02/11/2020     Priority: Medium     Leukocytosis 02/11/2020     Priority: Medium     ACP (advance care planning) 01/24/2020     Priority: Medium     Formatting of this note might be different from the original.  Patient has identified Health Care Agent(s): Yes  Add Health Care Agents: Yes    Health Care Agent(s):  Primary Health Care Agent: Christo Herrera Relationship: Father Phone: [phone]; [phone]   Secondary Health Care Agent: Sherry Moreira Relationship: Mother Phone: [phone]   Third Health Care Agent: Emiliano Herrera Relationship: Brother Phone: [phone]   Conservator:  Relationship:  Phone:    Guardian:  Relationship:  Phone:      Patient has Advance Care Plan Documents (Health Care Directive, POLST): No       Lower GI bleed 01/19/2020     Priority: Medium     Hepatic encephalopathy (H) 01/14/2020     Priority: Medium     ESRD needing dialysis (H) 01/10/2020     Priority: Medium     Acute on chronic anemia 01/10/2020     Priority: Medium     Acute alcoholic liver disease 11/27/2019     Priority: Medium     MARYBEL (acute kidney injury) (H) 11/27/2019     Priority: Medium     Alcohol dependence (H) 11/27/2019     Priority: Medium     Coagulopathy (H) 11/27/2019     Priority: Medium     Hyponatremia 11/27/2019     Priority: Medium     Normocytic anemia 11/27/2019     Priority: Medium     Portal hypertension (H) 11/27/2019     Priority: Medium     Allergic rhinitis 05/29/2011     Priority: Medium     Right groin pain 05/29/2011     Priority: Medium       MEDICATIONS:   Prescription Medications as of 8/4/2021       Rx Number Disp Refills Start End Last Dispensed Date Next Fill Date Owning Pharmacy    gabapentin (NEURONTIN) 100 MG capsule    8/11/2020    [facility] #[id number] [address]    Sig: Take 100 mg by mouth nightly as needed     Class: Historical    Route: Oral    guaiFENesin (MUCINEX) 600 MG 12 hr tablet    2/15/2020        Sig: Take 600 mg by mouth 2 times daily as needed for congestion or cough     Class: Historical    Route: Oral    lactulose (CHRONULAC) 10 GM/15ML solution    2/15/2020        Sig: Take 10 g by mouth daily as needed     Class: Historical    Route: Oral    Menthol-Methyl Salicylate (ICY HOT EXTRA STRENGTH) 10-30 % CREA            Class: Historical    Route: Topical    midodrine (PROAMATINE) 5 MG tablet    2/15/2020        Sig: Take 5 mg by mouth 3 times daily     Class: Historical    Route: Oral    Multiple Vitamins-Minerals (SUPER THERA PRERNA M) TABS    12/29/2019        Sig: Take 1 tablet by mouth    Class: Historical    Route:  Oral    Psyllium 58.12 % PACK            Sig: Take by mouth 2 times daily     Class: Historical    Route: Oral    rifaximin (XIFAXAN) 550 MG TABS tablet  60 tablet 11 5/24/2021    [facility] [address] [place] [address] AT [facility]    Sig: Take 1 tablet (550 mg) by mouth 2 times daily    Class: E-Prescribe    Route: Oral    Cholecalciferol (VITAMIN D HIGH POTENCY) 25 MCG (1000 UT) CAPS  30 capsule 3 7/6/2020    [facility] [address] [place] [address] AT [facility]    Sig: TAKE ONE CAPSULE BY MOUTH DAILY    Class: E-Prescribe    ciprofloxacin (CIPRO) 250 MG tablet  7 tablet 0 3/16/2020    [facility] [address] [place] [address]    Sig: Take one 250 mg tab every 24 hours AFTER dialysis for 7 days.    Class: E-Prescribe    FEROSUL 325 (65 Fe) MG tablet    7/5/2020    [facility] [address] [place] [address] AT [facility]    Class: Historical    levETIRAcetam (KEPPRA) 500 MG tablet    8/1/2020    [address]    Class: Historical    Misc. Devices (ROLLATOR ULTRA-LIGHT) MISC    1/27/2020        Sig: Walker with front wheels for home use.    Class: Historical    OYSCO 500 + D 500-200 MG-UNIT tablet  60 tablet 3 7/8/2020    [facility] [address] [place], [place] [address] [facility] AT [facility]    Sig: TAKE 1 TABLET BY MOUTH TWICE DAILY WITH MEALS    Class: E-Prescribe    pantoprazole (PROTONIX) 40 MG EC tablet    8/11/2020    [facility] ST[facility] [address] [place] [address]    Sig: Take 40 mg by mouth     Class: Historical    Route: Oral    torsemide (DEMADEX) 100 MG tablet        [facility] [address] [place] [address], [address]    Sig: Take 100 mg by mouth daily    Class: Historical    Route: Oral    vitamin B1 (VITAMIN B-1) 100 MG tablet    3/9/2020        Sig: Take 100 mg by mouth    Class: Historical    Route: Oral      Hospital Medications as of 8/4/2021       Dose Frequency Start End    iopamidol (ISOVUE-370) solution 100 mL (Completed) 100 mL ONCE 8/4/2021 8/4/2021    Class: E-Prescribe    Route: Intravenous    sodium chloride 0.9 % bag 500mL for CT scan flush use (Completed) 100 mL ONCE 8/4/2021 8/4/2021    Class: E-Prescribe    Route: As instructed          ALLERGIES:   Patient has no known allergies.    ROS:  As above      Physical Examination:   VITALS:   /86 (BP Location: Right arm, Cuff Size: Infant)   Pulse 80   Wt 55 kg (121 lb 4.1 oz)   BMI 18.99 kg/m    Constitutional: alert and no distress  Head: Normocephalic. No scleral icterus.  ENT: OP clear, Mal 1  Card: RRR. No murmur  Respiratory: CTAB  Gastrointestinal: Distended, obvious fluid  Skin: No jaundice  Neurologic: No asterixis  Psychiatric: affect normal/bright and mentation appears normal.    Labs:    BMP RESULTS:  Lab Results   Component Value Date     08/04/2021     01/12/2021    POTASSIUM 4.9 08/04/2021    POTASSIUM 5.4 (H) 01/12/2021    CHLORIDE 108 08/04/2021    CHLORIDE 108 01/12/2021    CO2 18 (L) 08/04/2021    CO2 23 01/12/2021    ANIONGAP 7 08/04/2021    ANIONGAP 6 01/12/2021    GLC 97 08/04/2021    GLC 93 01/12/2021    BUN 49 (H) 08/04/2021    BUN 48 (H) 01/12/2021    CR 1.43 (H) 08/04/2021    CR 1.39 (H) 01/12/2021    GFRESTIMATED 63 08/04/2021    GFRESTIMATED 65 01/12/2021    GFRESTBLACK 76 01/12/2021    JILLIAN 8.5 08/04/2021    JILLIAN 8.8 01/12/2021        CBC RESULTS:  Lab Results   Component Value Date    WBC 6.0 08/04/2021    WBC 4.9 01/12/2021    RBC 5.00 08/04/2021    RBC 4.41 01/12/2021    HGB 15.5 08/04/2021    HGB 13.4 01/12/2021    HCT 46.9 08/04/2021    HCT 41.7 01/12/2021    MCV 94 08/04/2021    MCV 95 01/12/2021    MCH  31.0 08/04/2021    MCH 30.4 01/12/2021    MCHC 33.0 08/04/2021    MCHC 32.1 01/12/2021    RDW 14.6 08/04/2021    RDW 14.6 01/12/2021     (L) 08/04/2021     (L) 01/12/2021       INR/PTT:  Lab Results   Component Value Date    INR 1.14 08/04/2021    INR 1.2 07/20/2021    INR 1.31 (H) 01/12/2021       Diagnostic studies: CT abdomen pelvis with IV contrast today was reviewed by me.  It demonstrates patent splenic, superior mesenteric, and portal veins.  The portal bifurcation is visualized as are the proximal right and left portal veins.  Hepatic veins are compressed in the setting of cirrhosis.  Artery not evaluated due to lack of arterial phase imaging.    Prior abdominal ultrasound only Dopplers the main portal vein.    Assessment: 36-year-old male with alcohol induced cirrhosis ([place] MELD 17, Child Jo B, ECOG 0) and evidence of portal hypertension, with history of varices and remote GI bleeding, as well as large volume refractory ascites that is lifestyle limiting.  Overall, he is an appropriate candidate for TIPS.  The only missing information is confirmation of his hepatic artery patency.  Cardiac echo was normal.  He has abstained from alcohol and has clincically improved since his initial diagnosis in 2019. His has undergone consultation for liver transplant.  Explained that the TIPS is a bridge to liver transplant, and is not a cure for his liver disease. I discussed the TIPS procedure, and the risks of technical failure, bleeding, infection, and refractory ascites which can occur in a small subset of patients.  He would like to proceed.  Given previous renal failure, single session large volume paracentesis is not recommended.    Plan:  1.  Abdominal ultrasound with Doppler to confirm hepatic artery patency prior to TIPS.  2.  As long as the hepatic artery is patent, I will proceed with TIPS and paracentesis with general anesthesia.  3.  I will arrange 2 additional paracenteses prior to his TIPS  such that large-volume paracentesis is not needed at the time of TIPS.  This will reduce his risk of acute kidney injury following fluid removal.     It was a pleasure to meet Mr. Herrera and his mother in clinic today.  Thank you for involving the interventional radiology service in his care.    I spent a total of 40 minutes face-to-face time on today's clinic visit, over 50% time was for counseling and care coordination.  In addition on the day of visit I spent 10 minutes reviewing imaging and 10 minutes completing documentation.    Malu Pizarro MD  Interventional Radiology   Pager [phone]    CC  Patient Care Team:  Annie Lara MD as PCP - Christo Livingston Jr., RN as Nurse Coordinator (Transplant)  Luda Caraballo MD as Referring Physician (Gastroenterology)  Jada Velasquez MD as Assigned Heart and Vascular Provider  Brandin Echavarria MD as Assigned Gastroenterology Provider  Josr Broderick MD as Assigned Surgical Provider  ANNIE LARA  '

## 2021-08-04 NOTE — PATIENT INSTRUCTIONS
Christo you have had your consult today with Dr Moira SCHNEIDER to discuss TIPS procedure.    Plan    We will schedule you for Liver Doppler Ultrasound to be completed anytime prior to TIPs procedure    We will contact you to schedule your TIPS procedure with Dr Pizarro.    You will need a PACs or pre anesthesia clinic visit, this will be a history and physical clearing you for the procedure.      Please schedule Paracentesis 4-5 days prior to procedure and then again 1-2 days before procedure.    Let me know if you have any questions or concerns.    We will call you with the appointment if your TIPS PLACEMENT.    On the day of the appointment, you will check  in 2 hours early to your scheduled procedure.     Please check into the 3rd floor, Pre-Op , located  at [facility], located at [address] [place] [address].     *please note that you will be given General Anesthesia sedation for this procedure.       Reminders:    -Nothing to eat or drink after midnight the night before.     -Please take your morning medications as indicated with enough water to swallow.     -Please also note that you will be going home, so therefore make sure that you have a .     *We ask that you continue to take your medications and attend Paracentesis appointments as scheduled. We do not want you to discontinue your medications, especially if it is related to your liver disease.     Once the TIPS is placed, it will take a few weeks for it to function properly, so therefore attending Paracentesis appointments is important as you will notice that over time there will be less fluid removed.     **Should you experience any of the symptoms below please let us know.     1. Confusion- Hepatic encephalopathy. This is a dangerous symptom that can happen after the TIPS procedure. Please go immediately to the Emergency room    2. Swelling of lower legs- please notify us as soon as possible. This can happen  afterwards as well. Please make sure to connect with your Hepatologist/Liver doctor for any diuretics.     3. Fever and abd pain-please call me as this may indicate an infection or so.      We will call you 2-3 days after you go home.     Follow up: We will see you at 1, 3 ,6, 12 months after TIPS placement for proper follow up with an Ultrasound to evaluate if the TIPS is still patent. Otherwise your Hepatologist will follow up with you.       Should you have any further questions, please call us anytime. It has been a pleasure to see you today.        Chanel, RN, BSN  Interventional Radiology Nurse Coordinator   Phone: [phone]

## 2021-08-04 NOTE — LETTER
8/4/2021      RE: Christo Herrera  [address] [place] [address]           INTERVENTIONAL RADIOLOGY CONSULTATION    Name: Christo Herrera  Age: 36 year old   Referring Physician: Dr. Lopez Valadez, Dr. Echavarria  REASON FOR REFERRAL:  Portal hypertension, refractory ascites    HPI:   Mr. Herrera is seen in person in clinic today to discuss treatment options for portal hypertension.  He is accompanied by his mother.    In summary, he is a 36-year-old male with alcohol induced cirrhosis.  His diagnosis was in the context of acute alcohol induced hepatitis in November to December 2019.  At that time, he was jaundiced, and had large volume ascites.  His work-up included an EGD that demonstrated some varic esophageal varices.  Since his diagnosis, he is abstain from alcohol successfully.  He states that his last alcohol use was immediately prior to his hospitalization in November 2019.    Since his diagnosis, he has struggled with ongoing ascites.  Although the volume had decreased somewhat since his original diagnosis, he is requiring every other week paracentesis, removing approximately 7 L each time.  He has never had spontaneous bacterial peritonitis today this.    No history of encephalopathy, jaundice, acute liver failure.  He only has trivial leg swelling.    He had 1 episode of GI bleeding secondary to hemorrhoid.  No intervention required.    No chest pain. He has some mild dyspnea he contributes to his abdominal fluid.  He is able to walk several miles without fatiguing.  He also rides a stationary bike for approximately 3 minutes before requiring a break.    The contents of context of his initial liver disease diagnosis at the end of 2019, he did require dialysis for short period of time.  He has not required dialysis since early 2020.    He does have restless legs.    With regards to the remainder of his work-up, he had a normal stress echo in July 2020, which also mentioned no valve  abnormalities, and normal right and left heart function.    PAST MEDICAL HISTORY:   Past Medical History:   Diagnosis Date     Alcoholic cirrhosis of liver with ascites (H) 3/10/2020     Ascites      ESRD (end stage renal disease) (H)      Essential (primary) hypertension 9/17/2020     GERD (gastroesophageal reflux disease)      HE (hepatic encephalopathy) (H)      History of blood transfusion        PAST SURGICAL HISTORY:   Past Surgical History:   Procedure Laterality Date     COLONOSCOPY      [facility] 2020     GI SURGERY       US PARACENTESIS WITH ALBUMIN  4/2/2020     US PARACENTESIS WITH ALBUMIN  4/21/2020     US PARACENTESIS WITH ALBUMIN  6/4/2020     US PARACENTESIS WITH ALBUMIN  6/19/2020     US PARACENTESIS WITH ALBUMIN  7/29/2020     US PARACENTESIS WITH ALBUMIN  10/5/2020     US PARACENTESIS WITH ALBUMIN  11/3/2020     US PARACENTESIS WITH ALBUMIN  11/11/2020     US PARACENTESIS WITH ALBUMIN  11/24/2020     US PARACENTESIS WITH ALBUMIN  12/3/2020     US PARACENTESIS WITH ALBUMIN  12/11/2020     US PARACENTESIS WITH ALBUMIN  12/18/2020     US PARACENTESIS WITH ALBUMIN  12/30/2020     US PARACENTESIS WITH ALBUMIN  1/7/2021     US PARACENTESIS WITH ALBUMIN  1/18/2021     US PARACENTESIS WITH ALBUMIN  1/27/2021     US PARACENTESIS WITH ALBUMIN  2/5/2021     US PARACENTESIS WITH ALBUMIN  2/16/2021     US PARACENTESIS WITH ALBUMIN  2/26/2021     US PARACENTESIS WITH ALBUMIN  3/12/2021     US PARACENTESIS WITH ALBUMIN  3/22/2021     US PARACENTESIS WITH ALBUMIN  4/1/2021     US PARACENTESIS WITH ALBUMIN  4/12/2021     US PARACENTESIS WITH ALBUMIN  4/23/2021     US PARACENTESIS WITH ALBUMIN  5/4/2021     US PARACENTESIS WITH ALBUMIN  5/14/2021     US PARACENTESIS WITH ALBUMIN  5/24/2021     US PARACENTESIS WITH ALBUMIN  6/3/2021     US PARACENTESIS WITH ALBUMIN  6/14/2021     US PARACENTESIS WITH ALBUMIN  6/25/2021     US PARACENTESIS WITH ALBUMIN  7/5/2021       FAMILY HISTORY:   Family History   Problem  Relation Age of Onset     Coronary Artery Disease Father      Hypertension Maternal Grandfather        SOCIAL HISTORY:   Social History     Tobacco Use     Smoking status: Never Smoker     Smokeless tobacco: Never Used   Substance Use Topics     Alcohol use: Not Currently     Comment: Quit 11/2019       PROBLEM LIST:   Patient Active Problem List    Diagnosis Date Noted     Acute renal failure (H) 07/30/2021     Priority: Medium     Essential (primary) hypertension 09/17/2020     Priority: Medium     Stage 3 chronic kidney disease 09/17/2020     Priority: Medium     Adjustment disorder, unspecified type 07/20/2020     Priority: Medium     Alcohol use disorder, severe, in early remission (H) 07/20/2020     Priority: Medium     Seizures (H) 06/24/2020     Priority: Medium     Cirrhosis of liver with ascites (H) 06/22/2020     Priority: Medium     Demand ischemia (H) 06/22/2020     Priority: Medium     Hyperkalemia 06/22/2020     Priority: Medium     Metabolic acidosis 06/22/2020     Priority: Medium     Pericardial effusion 06/22/2020     Priority: Medium     Alcoholic cirrhosis of liver with ascites (H) 03/10/2020     Priority: Medium     Dyslipidemia 02/26/2020     Priority: Medium     Plantar fasciitis 02/26/2020     Priority: Medium     Tobacco use disorder 02/26/2020     Priority: Medium     Vitamin D deficiency 02/26/2020     Priority: Medium     Hypotension 02/11/2020     Priority: Medium     Leukocytosis 02/11/2020     Priority: Medium     ACP (advance care planning) 01/24/2020     Priority: Medium     Formatting of this note might be different from the original.  Patient has identified Health Care Agent(s): Yes  Add Health Care Agents: Yes    Health Care Agent(s):  Primary Health Care Agent: Christo Herrera Relationship: Father Phone: [phone]; [phone]   Secondary Health Care Agent: Shrery Moreira Relationship: Mother Phone: [phone]   Third Health Care Agent: Emiliano Herrera Relationship:   Phone: [phone]   Conservator:  Relationship:  Phone:    Guardian: Relationship:  Phone:      Patient has Advance Care Plan Documents (Health Care Directive, POLST): No       Lower GI bleed 01/19/2020     Priority: Medium     Hepatic encephalopathy (H) 01/14/2020     Priority: Medium     ESRD needing dialysis (H) 01/10/2020     Priority: Medium     Acute on chronic anemia 01/10/2020     Priority: Medium     Acute alcoholic liver disease 11/27/2019     Priority: Medium     MARYBEL (acute kidney injury) (H) 11/27/2019     Priority: Medium     Alcohol dependence (H) 11/27/2019     Priority: Medium     Coagulopathy (H) 11/27/2019     Priority: Medium     Hyponatremia 11/27/2019     Priority: Medium     Normocytic anemia 11/27/2019     Priority: Medium     Portal hypertension (H) 11/27/2019     Priority: Medium     Allergic rhinitis 05/29/2011     Priority: Medium     Right groin pain 05/29/2011     Priority: Medium       MEDICATIONS:   Prescription Medications as of 8/4/2021       Rx Number Disp Refills Start End Last Dispensed Date Next Fill Date Owning Pharmacy    gabapentin (NEURONTIN) 100 MG capsule    8/11/2020    [id number] [address]    Sig: Take 100 mg by mouth nightly as needed     Class: Historical    Route: Oral    guaiFENesin (MUCINEX) 600 MG 12 hr tablet    2/15/2020        Sig: Take 600 mg by mouth 2 times daily as needed for congestion or cough     Class: Historical    Route: Oral    lactulose (CHRONULAC) 10 GM/15ML solution    2/15/2020        Sig: Take 10 g by mouth daily as needed     Class: Historical    Route: Oral    Menthol-Methyl Salicylate (ICY HOT EXTRA STRENGTH) 10-30 % CREA            Class: Historical    Route: Topical    midodrine (PROAMATINE) 5 MG tablet    2/15/2020        Sig: Take 5 mg by mouth 3 times daily     Class: Historical    Route: Oral    Multiple Vitamins-Minerals (SUPER THERA PRERNA M) TABS     12/29/2019        Sig: Take 1 tablet by mouth    Class: Historical    Route: Oral    Psyllium 58.12 % PACK            Sig: Take by mouth 2 times daily     Class: Historical    Route: Oral    rifaximin (XIFAXAN) 550 MG TABS tablet  60 tablet 11 5/24/2021    [facility] [address] [place] [address], [place] - [address] [facility]    Sig: Take 1 tablet (550 mg) by mouth 2 times daily    Class: E-Prescribe    Route: Oral    Cholecalciferol (VITAMIN D HIGH POTENCY) 25 MCG (1000 UT) CAPS  30 capsule 3 7/6/2020    [facility] [address] [place] [address], [place] [address] [facility]    Sig: TAKE ONE CAPSULE BY MOUTH DAILY    Class: E-Prescribe    ciprofloxacin (CIPRO) 250 MG tablet  7 tablet 0 3/16/2020    [facility] [address] [place] [address], [place] [address] [facility]    Sig: Take one 250 mg tab every 24 hours AFTER dialysis for 7 days.    Class: E-Prescribe    FEROSUL 325 (65 Fe) MG tablet    7/5/2020    [facility] [address] [place] [address], [place] [address] [facility]    Class: Historical    levETIRAcetam (KEPPRA) 500 MG tablet    8/1/2020    [facility] [address] [place] [address], [place] [address] [facility]    Class: Historical    Misc. Devices (ROLLATOR ULTRA-LIGHT) MISC    1/27/2020        Sig: Walker with front wheels for home use.    Class: Historical    OYSCO 500 + D 500-200 MG-UNIT tablet  60 tablet 3 7/8/2020    [facility] [address] [place] [address], [place] [address] [facility]    Sig: TAKE 1 TABLET BY MOUTH TWICE DAILY WITH MEALS    Class: E-Prescribe    pantoprazole (PROTONIX) 40 MG EC tablet    8/11/2020    [facility] [address] [place] [address], [place] [address] [facility]    Sig: Take 40 mg by mouth     Class: Historical    Route: Oral    torsemide (DEMADEX) 100 MG tablet        [facility] #[id number] [place], [place] - [address] [facility]    Sig: Take 100 mg by mouth daily    Class: Historical    Route: Oral    vitamin B1 (VITAMIN B-1) 100 MG tablet    3/9/2020        Sig: Take 100 mg by mouth    Class: Historical    Route: Oral      Hospital Medications as of 8/4/2021       Dose Frequency Start End    iopamidol (ISOVUE-370) solution 100 mL (Completed) 100 mL ONCE 8/4/2021 8/4/2021    Class: E-Prescribe    Route: Intravenous    sodium chloride 0.9 % bag 500mL for CT scan flush use (Completed) 100 mL ONCE 8/4/2021 8/4/2021    Class: E-Prescribe    Route: As instructed          ALLERGIES:   Patient has no known allergies.    ROS:  As above      Physical Examination:   VITALS:   /86 (BP Location: Right arm, Cuff Size: Infant)   Pulse 80   Wt 55 kg (121 lb 4.1 oz)   BMI 18.99 kg/m    Constitutional: alert and no distress  Head: Normocephalic. No scleral icterus.  ENT: OP clear, Mal 1  Card: RRR. No murmur  Respiratory: CTAB  Gastrointestinal: Distended, obvious fluid  Skin: No jaundice  Neurologic: No asterixis  Psychiatric: affect normal/bright and mentation appears normal.    Labs:    BMP RESULTS:  Lab Results   Component Value Date     08/04/2021     01/12/2021    POTASSIUM 4.9 08/04/2021    POTASSIUM 5.4 (H) 01/12/2021    CHLORIDE 108 08/04/2021    CHLORIDE 108 01/12/2021    CO2 18 (L) 08/04/2021    CO2 23 01/12/2021    ANIONGAP 7 08/04/2021    ANIONGAP 6 01/12/2021    GLC 97 08/04/2021    GLC 93 01/12/2021    BUN 49 (H) 08/04/2021    BUN 48 (H) 01/12/2021    CR 1.43 (H) 08/04/2021    CR 1.39 (H) 01/12/2021    GFRESTIMATED 63 08/04/2021    GFRESTIMATED 65 01/12/2021    GFRESTBLACK 76 01/12/2021    JILLIAN 8.5 08/04/2021    JILLIAN 8.8 01/12/2021        CBC RESULTS:  Lab Results   Component Value Date    WBC 6.0 08/04/2021    WBC 4.9 01/12/2021    RBC 5.00 08/04/2021    RBC 4.41 01/12/2021    HGB 15.5 08/04/2021    HGB 13.4 01/12/2021    HCT 46.9  08/04/2021    HCT 41.7 01/12/2021    MCV 94 08/04/2021    MCV 95 01/12/2021    MCH 31.0 08/04/2021    MCH 30.4 01/12/2021    MCHC 33.0 08/04/2021    MCHC 32.1 01/12/2021    RDW 14.6 08/04/2021    RDW 14.6 01/12/2021     (L) 08/04/2021     (L) 01/12/2021       INR/PTT:  Lab Results   Component Value Date    INR 1.14 08/04/2021    INR 1.2 07/20/2021    INR 1.31 (H) 01/12/2021       Diagnostic studies: CT abdomen pelvis with IV contrast today was reviewed by me.  It demonstrates patent splenic, superior mesenteric, and portal veins.  The portal bifurcation is visualized as are the proximal right and left portal veins.  Hepatic veins are compressed in the setting of cirrhosis.  Artery not evaluated due to lack of arterial phase imaging.    Prior abdominal ultrasound only Dopplers the main portal vein.    Assessment: 36-year-old male with alcohol induced cirrhosis (Na MELD 17, Child Jo B, ECOG 0) and evidence of portal hypertension, with history of varices and remote GI bleeding, as well as large volume refractory ascites that is lifestyle limiting.  Overall, he is an appropriate candidate for TIPS.  The only missing information is confirmation of his hepatic artery patency.  Cardiac echo was normal.  He has abstained from alcohol and has clincically improved since his initial diagnosis in 2019. His has undergone consultation for liver transplant.  Explained that the TIPS is a bridge to liver transplant, and is not a cure for his liver disease. I discussed the TIPS procedure, and the risks of technical failure, bleeding, infection, and refractory ascites which can occur in a small subset of patients.  He would like to proceed.  Given previous renal failure, single session large volume paracentesis is not recommended.    Plan:  1.  Abdominal ultrasound with Doppler to confirm hepatic artery patency prior to TIPS.  2.  As long as the hepatic artery is patent, I will proceed with TIPS and paracentesis with  general anesthesia.  3.  I will arrange 2 additional paracenteses prior to his TIPS such that large-volume paracentesis is not needed at the time of TIPS.  This will reduce his risk of acute kidney injury following fluid removal.     It was a pleasure to meet Mr. Herrera and his mother in clinic today.  Thank you for involving the interventional radiology service in his care.    I spent a total of 40 minutes face-to-face time on today's clinic visit, over 50% time was for counseling and care coordination.  In addition on the day of visit I spent 10 minutes reviewing imaging and 10 minutes completing documentation.    Malu Pizarro MD  Interventional Radiology   Pager [phone]    CC  Patient Care Team:  Annie Lara MD as PCP - Christo Livingston Jr. RN as Nurse Coordinator (Transplant)  Luda Caraballo MD as Referring Physician (Gastroenterology)  Jada Velasquez MD as Assigned Heart and Vascular Provider  Brandin Echavarria MD as Assigned Gastroenterology Provider  Josr Broderick MD as Assigned Surgical Provider

## 2021-08-05 ENCOUNTER — HOSPITAL ENCOUNTER (OUTPATIENT)
Dept: ULTRASOUND IMAGING | Facility: CLINIC | Age: 36
Discharge: HOME | End: 2021-08-05
Attending: INTERNAL MEDICINE | Admitting: INTERNAL MEDICINE
Payer: COMMERCIAL

## 2021-08-05 DIAGNOSIS — R18.8 OTHER ASCITES: ICD-10-CM

## 2021-08-05 PROCEDURE — P9047 ALBUMIN (HUMAN), 25%, 50ML: HCPCS | Performed by: INTERNAL MEDICINE

## 2021-08-05 PROCEDURE — 250N000011 HC RX IP 250 OP 636: Performed by: INTERNAL MEDICINE

## 2021-08-05 PROCEDURE — 49083 ABD PARACENTESIS W/IMAGING: CPT

## 2021-08-05 PROCEDURE — 999N000127 HC STATISTIC PERIPHERAL IV START W US GUIDANCE

## 2021-08-05 RX ORDER — ALBUMIN (HUMAN) 12.5 G/50ML
25 SOLUTION INTRAVENOUS ONCE
Status: COMPLETED | OUTPATIENT
Start: 2021-08-05 | End: 2021-08-05

## 2021-08-05 RX ADMIN — ALBUMIN (HUMAN) 25 G: 12.5 SOLUTION INTRAVENOUS at 13:47

## 2021-08-05 RX ADMIN — ALBUMIN (HUMAN) 25 G: 12.5 SOLUTION INTRAVENOUS at 13:32

## 2021-08-05 NOTE — PROGRESS NOTES
Patient tolerated paracentesis with albumin with no concerns expressed or observed.    8 L removed with 50 grams albumin given per order.    Katherin Gomez RN

## 2021-08-06 ENCOUNTER — TELEPHONE (OUTPATIENT)
Dept: RADIOLOGY | Facility: CLINIC | Age: 36
End: 2021-08-06

## 2021-08-06 ENCOUNTER — ANCILLARY PROCEDURE (OUTPATIENT)
Dept: ULTRASOUND IMAGING | Facility: CLINIC | Age: 36
End: 2021-08-06
Attending: RADIOLOGY
Payer: COMMERCIAL

## 2021-08-06 DIAGNOSIS — Z11.59 ENCOUNTER FOR SCREENING FOR OTHER VIRAL DISEASES: ICD-10-CM

## 2021-08-06 DIAGNOSIS — K70.31 ALCOHOLIC CIRRHOSIS OF LIVER WITH ASCITES (H): ICD-10-CM

## 2021-08-06 PROCEDURE — 93975 VASCULAR STUDY: CPT | Mod: GC | Performed by: RADIOLOGY

## 2021-08-06 NOTE — TELEPHONE ENCOUNTER
I called and left a voicemail including my call back number.  I called to let him know I have mailed a letter and sent [employer] message with appointment details for his TIPS procedure.  CLIFTON Araiza RN, BSN  Interventional Radiology Nurse Coordinator   Phone:  [phone]

## 2021-08-06 NOTE — TELEPHONE ENCOUNTER
FUTURE VISIT INFORMATION      SURGERY INFORMATION:    Date: 21    Location: [facility] OR    Surgeon:  Malu Pizarro MD    Anesthesia Type:  General    Procedure: ANESTHESIA OUT OF OR Paracentesis,  Transjugular intrahepatic portosystemic shunt (TIPS) Procedure @0800    Consult: ov     RECORDS REQUESTED FROM:       Primary Care Provider: Annie Osborne MD - [facility]    Pertinent Medical History: Hypotension, hypertension, pericardial effision    Most recent EKG+ Tracin21    Most recent ECHO:7/3/20- [facility]    Most recent Cardiac Stress Test: 7/15/20

## 2021-08-10 LAB — PETH BLD-MCNC: NEGATIVE NG/ML

## 2021-08-11 ENCOUNTER — PRE VISIT (OUTPATIENT)
Dept: SURGERY | Facility: CLINIC | Age: 36
End: 2021-08-11

## 2021-08-11 ENCOUNTER — ANESTHESIA EVENT (OUTPATIENT)
Dept: SURGERY | Facility: CLINIC | Age: 36
End: 2021-08-11

## 2021-08-11 ENCOUNTER — OFFICE VISIT (OUTPATIENT)
Dept: SURGERY | Facility: CLINIC | Age: 36
End: 2021-08-11
Payer: COMMERCIAL

## 2021-08-11 VITALS
TEMPERATURE: 98.2 F | SYSTOLIC BLOOD PRESSURE: 123 MMHG | DIASTOLIC BLOOD PRESSURE: 85 MMHG | HEART RATE: 70 BPM | HEIGHT: 67 IN | WEIGHT: 126.6 LBS | OXYGEN SATURATION: 99 % | RESPIRATION RATE: 16 BRPM | BODY MASS INDEX: 19.87 KG/M2

## 2021-08-11 DIAGNOSIS — Z01.818 PREOP EXAMINATION: Primary | ICD-10-CM

## 2021-08-11 PROCEDURE — 99204 OFFICE O/P NEW MOD 45 MIN: CPT | Performed by: PHYSICIAN ASSISTANT

## 2021-08-11 ASSESSMENT — PAIN SCALES - GENERAL: PAINLEVEL: NO PAIN (0)

## 2021-08-11 ASSESSMENT — MIFFLIN-ST. JEOR: SCORE: 1467.88

## 2021-08-11 ASSESSMENT — LIFESTYLE VARIABLES: TOBACCO_USE: 0

## 2021-08-11 NOTE — PATIENT INSTRUCTIONS
Preparing for Your Surgery      Name:  Christo Herrera   MRN:  [id number]   :  1985   Today's Date:  2021       Arriving for surgery:  Surgery date:  21  Arrival time:  6:00 am    Restrictions due to COVID 19:       One visitor is allowed in the Pre Op area. When you go into surgery, one visitor is allowed to wait in the Surgery Waiting Room       (provided there is enough space to social distance).   After surgery- Two visitors are allowed at a time if you have a private room and one visitor is allowed for those in a semi-private room.   Every 4 days the visitor(s) can rotate. During the 4 day period, the visitor(s) must be consistent. No visitors under the age of 18 years old.   Visiting Hours: 8 am - 8:30 pm   No ill visitors.   All visitors must wear face mask.    [employer] parking is available for anyone with mobility limitations or disabilities.  ([place]  24 hours/ 7 days a week; [facility]  7 am- 3:30 pm, Mon- Fri)    Please come to:     [facility] [address]  [address] [place] [address]    - ? parking is available in front of the hospital      -    Please proceed to [address] on the 3rd floor. [phone]?     - ?If you are in need of directions, wheelchair or escort please stop at the Information Desk in the lobby.  Inform the information person that you are here for surgery; a wheelchair and escort to [address] will be provided.?     What can I eat or drink?  -  You may eat and drink normally for up to 8 hours before your surgery. (Until midnight)  -  You may have clear liquids until 2 hours before surgery. (Until 6:00 am)    Examples of clear liquids:  Water  Clear broth  Juices (apple, white grape, white cranberry  and cider) without pulp  Noncarbonated, powder based beverages  (lemonade and Matt-Aid)  Sodas (Sprite, 7-Up, ginger ale and seltzer)  Coffee or tea (without milk or cream)  Gatorade    -  No Alcohol for  at least 24 hours before surgery     Which medicines can I take?    Hold Aspirin for 7 days before surgery.   Hold Multivitamins for 7 days before surgery.  Hold Supplements for 7 days before surgery.  Hold Ibuprofen (Advil, Motrin) for 1 day before surgery--unless otherwise directed by surgeon.  Hold Naproxen (Aleve) for 4 days before surgery.    -  DO NOT take these medications the day of surgery:  Lactulose (Chronulac)    -  PLEASE TAKE these medications the day of surgery:  Midodrine (Proamatine)  Rifaximin (Xifaxan)    How do I prepare myself?  - Please take 2 showers before surgery using Scrubcare or Hibiclens soap.    Use this soap only from the neck to your toes.     Leave the soap on your skin for one minute--then rinse thoroughly.      You may use your own shampoo and conditioner; no other hair products.   - Please remove all jewelry and body piercings.  - No lotions, deodorants or fragrance.  - Bring your ID and insurance card.    -If you have a Deep Brain Stimulator, Spinal Cord Stimulator or any neuro stimulator device---you must bring the remote control to the hospital     - All patients are required to have a Covid-19 test within 4 days of surgery/procedure.      -Patients will be contacted by the [facility] scheduling team within 1 week of surgery to make an appointment.      - Patients may call the Scheduling team at [phone] if they have not been scheduled within 4 days of  surgery.      ALL PATIENTS GOING HOME THE SAME DAY OF SURGERY ARE REQUIRED TO HAVE A RESPONSIBLE ADULT TO DRIVE AND BE IN ATTENDANCE WITH THEM FOR 24 HOURS FOLLOWING SURGERY.      Questions or Concerns:    - For any questions regarding the day of surgery or your hospital stay, please contact the Pre Admission Nursing Office at [phone].       - If you have health changes between today and your surgery please call your surgeon.       For questions after surgery please call your surgeons office.

## 2021-08-11 NOTE — H&P
Pre-Operative H & P     CC:  Preoperative exam to assess for increased cardiopulmonary risk while undergoing surgery and anesthesia.    Date of Encounter: 8/11/2021  Primary Care Physician:  Annie Lara     Reason for visit:  Alcoholic cirrhosis of liver with ascites (H)    NATHAN Herrera is a 35 y/o male who presents for pre-operative H&P in preparation for ANESTHESIA OUT OF OR Paracentesis,  Transjugular intrahepatic portosystemic shunt (TIPS) Procedure  with Malu Pizarro MD on 8/27/21 at [facility] for treatment of Alcoholic cirrhosis of liver with ascites (H). Patient is being evaluated for comorbid conditions of HTN, HLD, allergic rhinitis, hx seizure in setting of elevated ammonia, RLS, CKD3, hx blood transfusion, GERD, hx GI bleed.      Mr. Herrera has a history of alcohol induced cirrhosis.  His diagnosis was in the context of acute alcohol induced hepatitis in November to December 2019.  At that time, he was jaundiced, and had large volume ascites.  His work-up included an EGD that demonstrated some varic esophageal varices.  Since his diagnosis, he has abstained from alcohol successfully.  He states that his last alcohol use was immediately prior to his hospitalization in November 2019. Since his diagnosis, he has struggled with ongoing ascites.  He is requiring every other week paracentesis, removing approximately 7 L each time. MELD 17. He now presents for the above procedure.      History was obtained from patient & chart review.     Hx of abnormal bleeding or anti-platelet use: denies    Menstrual history: No LMP for male patient.:      Prior to Admission Medications  Current Outpatient Medications   Medication Sig Dispense Refill     gabapentin (NEURONTIN) 100 MG capsule Take 100 mg by mouth nightly as needed        guaiFENesin (MUCINEX) 600 MG 12 hr tablet Take 600 mg by mouth 2 times daily as needed for congestion or  cough        lactulose (CHRONULAC) 10 GM/15ML solution Take 10 g by mouth daily as needed        Menthol-Methyl Salicylate (ICY HOT EXTRA STRENGTH) 10-30 % CREA Apply topically daily as needed        midodrine (PROAMATINE) 5 MG tablet Take 5 mg by mouth 3 times daily        Multiple Vitamins-Minerals (SUPER THERA PRERNA M) TABS Take 1 tablet by mouth every morning        Psyllium 58.12 % PACK Take by mouth daily as needed        rifaximin (XIFAXAN) 550 MG TABS tablet Take 1 tablet (550 mg) by mouth 2 times daily (Patient taking differently: Take 550 mg by mouth every morning ) 60 tablet 11       Family History  Family History   Problem Relation Age of Onset     Coronary Artery Disease Father      Deep Vein Thrombosis (DVT) Brother      Hypertension Maternal Grandfather      Anesthesia Reaction No family hx of        The complete review of systems is negative other than noted in the HPI or here.          Anesthesia Pre-Procedure Evaluation    Patient: Christo Herrera   MRN: [id number] : 1985        Preoperative Diagnosis: * No surgery found *   Procedure :      Past Medical History:   Diagnosis Date     Alcoholic cirrhosis of liver with ascites (H) 03/10/2020     Ascites      CKD (chronic kidney disease) stage 3, GFR 30-59 ml/min      Essential (primary) hypertension 2020     GERD (gastroesophageal reflux disease)      HE (hepatic encephalopathy) (H)      History of blood transfusion      History of ESRD requiring dialysis 2019    Resolved     Restless legs syndrome (RLS)       Past Surgical History:   Procedure Laterality Date     COLONOSCOPY      [facility] 2020     DENTAL SURGERY  2014    wisdom teeth     GI SURGERY       US PARACENTESIS WITH ALBUMIN  2020     US PARACENTESIS WITH ALBUMIN  2020     US PARACENTESIS WITH ALBUMIN  2020     US PARACENTESIS WITH ALBUMIN  2020     US PARACENTESIS WITH ALBUMIN  2020     US PARACENTESIS WITH ALBUMIN  10/05/2020       PARACENTESIS WITH ALBUMIN  11/03/2020     US PARACENTESIS WITH ALBUMIN  11/11/2020     US PARACENTESIS WITH ALBUMIN  11/24/2020     US PARACENTESIS WITH ALBUMIN  12/03/2020     US PARACENTESIS WITH ALBUMIN  12/11/2020     US PARACENTESIS WITH ALBUMIN  12/18/2020     US PARACENTESIS WITH ALBUMIN  12/30/2020     US PARACENTESIS WITH ALBUMIN  01/07/2021     US PARACENTESIS WITH ALBUMIN  01/18/2021     US PARACENTESIS WITH ALBUMIN  01/27/2021     US PARACENTESIS WITH ALBUMIN  02/05/2021     US PARACENTESIS WITH ALBUMIN  02/16/2021     US PARACENTESIS WITH ALBUMIN  02/26/2021     US PARACENTESIS WITH ALBUMIN  03/12/2021     US PARACENTESIS WITH ALBUMIN  03/22/2021     US PARACENTESIS WITH ALBUMIN  04/01/2021     US PARACENTESIS WITH ALBUMIN  04/12/2021     US PARACENTESIS WITH ALBUMIN  04/23/2021     US PARACENTESIS WITH ALBUMIN  05/04/2021     US PARACENTESIS WITH ALBUMIN  05/14/2021     US PARACENTESIS WITH ALBUMIN  05/24/2021     US PARACENTESIS WITH ALBUMIN  06/03/2021     US PARACENTESIS WITH ALBUMIN  06/14/2021     US PARACENTESIS WITH ALBUMIN  06/25/2021     US PARACENTESIS WITH ALBUMIN  07/05/2021      No Known Allergies   Social History     Tobacco Use     Smoking status: Never Smoker     Smokeless tobacco: Never Used   Substance Use Topics     Alcohol use: Not Currently     Comment: Quit 11/2019      Wt Readings from Last 1 Encounters:   08/11/21 57.4 kg (126 lb 9.6 oz)              ROS/MED HX  The complete review of systems is negative other than noted in the HPI or here.  Patient denies recent illness, fever and respiratory infection during past month.  Pt denies steroid use during past year.    ENT/Pulmonary:  - neg pulmonary ROS  (-) tobacco use, asthma and sleep apnea   Neurologic: Comment: Hx of seizure in setting of elevated ammonia 6/2020    RLS    Hx hepatic encephalopathy, on lactulose        (+) no peripheral neuropathy  (-) no CVA   Cardiovascular: Comment: Orthostatic hypotension, taking  midodrine        (+) -----Previous cardiac testing   Echo: Date: 7/2020 Results:  1. Very small pericardial effusion (mostly posterior). No echocardiographic evidence of cardiac tamponade.   2. Normal left ventricular chamber size and systolic function. Calculated left ventricular ejection fraction is 65 %.   3. Normal right ventricular size and systolic function.   4. Normal inferior vena cava with decreased inspiratory collapse.   5. When compared to the previous echocardiographic images of 07/03/2020, pericardial effusion is smaller. HR has improved. Slight   septal bounce noted ( improved from prior echo). Ascites noted on both studies.    Stress Test: Date: 7/2020 Results:  Normal, low-risk dobutamine echocardiogram without evidence of ischemia at a   diagnostic workload (87% MPHR.)   The target heart rate was achieved.   Patient reported jaw pain during dobutamine infusion which resolved in the   recovery phase.   Normal biventricular size, thickness, and global systolic function at   baseline, LVEF=60-65%.   With low-dose dobutamine, LVEF augmented and LV cavity size decreased   appropriately.   With peak dobutamine, LVEF increased further to >70% and LV cavity size   decreased appropriately.   No regional wall motion abnormalities at rest or with dobutamine.   No ECG evidence of ischemia. Normal heart rate and blood pressure response to   dobutamine.   No significant valvular abnormalities are noted on screening Doppler exam.   The aortic root and visualized ascending aorta are normal.   PASP cannot be estimated, however PA pressure is probably normal based on   normal PA acceleration time (180 msec.)     ECG Reviewed: Date: Results:    Cath: Date: Results:      METS/Exercise Tolerance: 4 - Raking leaves, gardening Comment: Walks 10-15,000 steps per day. Ascends stairs w/o difficulty. Alternates jogging & walking daily.   Hematologic:     (+) history of blood transfusion, no previous transfusion reaction,   "(-) history of blood clots   Musculoskeletal: Comment: Chronic B/L elbow pain due to injury on steering wheel        GI/Hepatic: Comment: Alcoholic cirrhosis. Undergoes paracentesis every 11 days with 7L drained each time.    Hx GI bleed    MELD 17    (+) liver disease,  (-) GERD   Renal/Genitourinary: Comment: Creatinine 1.43, GFR 63 on 8/4/21    Hx dialysis, but not currently      (+) renal disease, type: CRI,     Endo:  - neg endo ROS  (-) Type II DM   Psychiatric/Substance Use: Comment: Sober since 11/2019      (+) alcohol abuse     Infectious Disease:  - neg infectious disease ROS     Malignancy:  - neg malignancy ROS     Other:  - neg other ROS                   126 lbs 9.6 oz  5' 7\"[pt reported[   Body mass index is 19.83 kg/m .       Physical Exam  Constitutional: Awake, alert, cooperative, no apparent distress, and appears stated age. Slender.  Eyes: Pupils equal, round and reactive to light, extra ocular muscles intact, sclera clear, conjunctiva normal.  HENT: Normocephalic, oral pharynx with moist mucus membranes, good dentition. No goiter appreciated. No removable dental hardware.  Respiratory: Clear to auscultation bilaterally, no crackles or wheezing. No SOB when supine.  Cardiovascular: Regular rate and rhythm, normal S1 and S2, and no murmur noted.  Carotids +2, no bruits. No edema. Palpable pulses to radial, DP and PT arteries.   GI: Normal bowel sounds, soft, mildly distended, non-tender, no masses palpated.  Ventral hernia. Significant abdominal striae.  Lymph/Hematologic: No cervical lymphadenopathy and no supraclavicular lymphadenopathy.  Genitourinary:  deferred  Skin: Warm and dry.  No rashes. Significant abdominal striae.  Musculoskeletal: full ROM of neck. There is no redness, warmth, or swelling of the joints. Gross motor strength is normal.    Neurologic: Awake, alert, oriented to name, place and time. Cranial nerves II-XII are grossly intact. Gait is normal. Ambulates from chair to exam " table, seats self, lies supine and sits back up w/o assistance.  Neuropsychiatric: Calm, cooperative. Normal affect. Pleasant. Answers questions appropriately, follows commands w/o difficulty.        PRIOR LABS/DIAGNOSTIC STUDIES:    All labs and imaging personally reviewed      CT ABDOMEN PELVIS W CONTRAST, 8/4/2021   IMPRESSION:    1. Cirrhotic configuration of the liver with additional features of   portal hypertension including moderate to large abdominal ascites,   splenorenal shunting, mild splenomegaly, and recanalized umbilical   vein.   2. Patent portal venous system.  No thrombus. Nonvisualization of the   hepatic veins presumably due to timing of contrast bolus in the   setting of portal hypertension as above.   3. Cholelithiasis/hyperdense sludge.           US ABDOMEN COMPLETE WITH DOPPLER COMPLETE 1/12/2021   Impression:    1.  Patent Doppler evaluation of the abdomen with normal direction of   flow in the imaged vessels.   2.  Cirrhotic liver morphology with sequelae of portal hypertension   including large amount of ascites and splenomegaly.    3.  Contracted gallbladder with thickened, edematous wall likely   secondary to liver disease and ascites. Similar in appearance compared   to prior ultrasound 7/15/2020.         ECHOCARDIOGRAM 7/2020   1. Very small  pericardial effusion (mostly posterior). No echocardiographic evidence of cardiac tamponade.   2. Normal left ventricular chamber size and systolic function. Calculated left ventricular ejection fraction is 65 %.   3. Normal right ventricular size and systolic function.   4. Normal inferior vena cava with decreased inspiratory collapse.   5. When compared to the previous echocardiographic images of 07/03/2020, pericardial effusion is smaller. HR has improved. Slight   septal bounce noted ( improved from prior echo). Ascites noted on both studies.     Estimated EF: 65%   FINDINGS   Left Ventricle Normal left ventricular chamber size. Normal left  ventricular wall thickness. Normal left ventricular systolic   function. Calculated left ventricular ejection fraction (modified Guerrero technique) is 65 %. No regional wall   motion abnormalities.   Right Ventricle Normal right ventricular chamber size. Normal right ventricular systolic function.   Inferior Vena Cava Very small circumferential pericardial effusion. No echocardiographic evidence of cardiac tamponade. Normal inferior vena cava with decreased inspiratory collapse.             STRESS TEST 7/2020   Interpretation Summary   Normal, low-risk dobutamine echocardiogram without evidence of ischemia at a   diagnostic workload (87% MPHR.)   The target heart rate was achieved.   Patient reported jaw pain during dobutamine infusion which resolved in the   recovery phase.   Normal biventricular size, thickness, and global systolic function at   baseline, LVEF=60-65%.   With low-dose dobutamine, LVEF augmented and LV cavity size decreased   appropriately.   With peak dobutamine, LVEF increased further to >70% and LV cavity size   decreased appropriately.   No regional wall motion abnormalities at rest or with dobutamine.   No ECG evidence of ischemia. Normal heart rate and blood pressure response to   dobutamine.   No significant valvular abnormalities are noted on screening Doppler exam.   The aortic root and visualized ascending aorta are normal.   PASP cannot be estimated, however PA pressure is probably normal based on   normal PA acceleration time (180 msec.)         CBC:   Lab Results   Component Value Date    WBC 6.0 08/04/2021    WBC 4.9 01/12/2021    HGB 15.5 08/04/2021    HGB 13.4 01/12/2021    HCT 46.9 08/04/2021    HCT 41.7 01/12/2021     (L) 08/04/2021     (L) 01/12/2021     BMP:   Lab Results   Component Value Date     08/04/2021     01/12/2021    POTASSIUM 4.9 08/04/2021    POTASSIUM 5.4 (H) 01/12/2021    CHLORIDE 108 08/04/2021    CHLORIDE 108 01/12/2021    CO2 18 (L)  08/04/2021    CO2 23 01/12/2021    BUN 49 (H) 08/04/2021    BUN 48 (H) 01/12/2021    CR 1.43 (H) 08/04/2021    CR 1.39 (H) 01/12/2021    GLC 97 08/04/2021    GLC 93 01/12/2021     COAGS:   Lab Results   Component Value Date    INR 1.14 08/04/2021     POC: No results found for: BGM, HCG, HCGS  HEPATIC:   Lab Results   Component Value Date    ALBUMIN 3.4 08/04/2021    PROTTOTAL 6.3 (L) 08/04/2021    ALT 41 08/04/2021    AST 49 (H) 08/04/2021    ALKPHOS 154 (H) 08/04/2021    BILITOTAL 1.3 08/04/2021     OTHER:   Lab Results   Component Value Date    JILLIAN 8.5 08/04/2021    PHOS 3.3 03/10/2020    TSH 3.52 03/10/2020         Labs today: None    COVID19 testing scheduled on 8/24/21      The patient's records and results personally reviewed by this provider.     Outside records reviewed from: care everywhere    ASSESSMENT and PLAN  Christo Herrera is a 35 y/o male who presents for pre-operative H&P in preparation for ANESTHESIA OUT OF OR Paracentesis,  Transjugular intrahepatic portosystemic shunt (TIPS) Procedure  with Malu Pizarro MD on 8/27/21 at [facility] for treatment of Alcoholic cirrhosis of liver with ascites (H)    Pt has had prior anesthetic.  No history of anesthetic complications.    History of upper left front tooth damage during prior hospitalization, s/p restoration    He has the following specific operative considerations:   # OLMAN 1/8 = low risk  # VTE risk: 0.26%  # Risk of PONV score = 2.  If > 2, anti-emetic intervention recommended.  # Anesthesia considerations:  Refer to PAC assessment in anesthesia records      CARDIAC: METS 4,  Walks 10-15,000 steps per day. Ascends stairs w/o difficulty. Alternates jogging & walking daily.     # RCRI : No serious cardiac risks.  0.4% risk of major adverse cardiac event.     #  Echo 7/2020:  EF 65%, normal function, very small pericardial effusion     #  Stress test 7/2020: No ischemia       PULMONARY:     # Never  smoked    # Denies asthma or inhaler use    GI:     # Alcoholic cirrhosis. Undergoes paracentesis every 11 days with 7L drained each time. MELD 17    # Hx GI bleed    # Ventral hernia    /RENAL:     # Creatinine 1.43, GFR 63 on 8/4/21    # Hx dialysis, but not currently    ENDO: BMI 19  -  Will require careful positioning to protect bony prominences.    # No DM    HEME:     # INR 1.31 on 8/4/21    ORTHO:     # full ROM of neck    NEURO/PSYCH:     # Alcoholism, sober since 2019    # Hx of seizure in setting of elevated ammonia 6/2020    # Hx hepatic encephalopathy, on lactulose    # RLS      Patient is optimized and is acceptable candidate for the proposed procedure. No further diagnostic evaluation is needed.    Final plan per anesthesiologist on day of surgery.     AVS with information on surgery time/arrival time, meds and NPO status given by nursing staff.          On the day of service:     Prep time: 15 minutes  Visit time: 25 minutes  Documentation time: 13 minutes  ------------------------------------------  Total time: 53 minutes      Aide Martínez PA-C  Preoperative Assessment Center  [facility]  Phone: [phone]  Fax: [phone]

## 2021-08-11 NOTE — ANESTHESIA PREPROCEDURE EVALUATION
Anesthesia Pre-Procedure Evaluation    Patient: Christo Herrera   MRN: [id number] : 1985        Preoperative Diagnosis: * No surgery found *   Procedure :      Past Medical History:   Diagnosis Date     Alcoholic cirrhosis of liver with ascites (H) 03/10/2020     Ascites      CKD (chronic kidney disease) stage 3, GFR 30-59 ml/min      Essential (primary) hypertension 2020     GERD (gastroesophageal reflux disease)      HE (hepatic encephalopathy) (H)      History of blood transfusion      History of ESRD requiring dialysis 2019    Resolved     Restless legs syndrome (RLS)       Past Surgical History:   Procedure Laterality Date     COLONOSCOPY      [facility] 2020     DENTAL SURGERY  2014    wisdom teeth     GI SURGERY       US PARACENTESIS WITH ALBUMIN  2020     US PARACENTESIS WITH ALBUMIN  2020     US PARACENTESIS WITH ALBUMIN  2020     US PARACENTESIS WITH ALBUMIN  2020     US PARACENTESIS WITH ALBUMIN  2020     US PARACENTESIS WITH ALBUMIN  10/05/2020     US PARACENTESIS WITH ALBUMIN  2020     US PARACENTESIS WITH ALBUMIN  2020     US PARACENTESIS WITH ALBUMIN  2020     US PARACENTESIS WITH ALBUMIN  2020     US PARACENTESIS WITH ALBUMIN  2020     US PARACENTESIS WITH ALBUMIN  2020     US PARACENTESIS WITH ALBUMIN  2020     US PARACENTESIS WITH ALBUMIN  2021     US PARACENTESIS WITH ALBUMIN  2021     US PARACENTESIS WITH ALBUMIN  2021     US PARACENTESIS WITH ALBUMIN  2021     US PARACENTESIS WITH ALBUMIN  2021     US PARACENTESIS WITH ALBUMIN  2021     US PARACENTESIS WITH ALBUMIN  2021     US PARACENTESIS WITH ALBUMIN  2021     US PARACENTESIS WITH ALBUMIN  2021     US PARACENTESIS WITH ALBUMIN  2021     US PARACENTESIS WITH ALBUMIN  2021     US PARACENTESIS WITH ALBUMIN  2021     US PARACENTESIS WITH ALBUMIN  2021     US PARACENTESIS WITH  ALBUMIN  05/24/2021     US PARACENTESIS WITH ALBUMIN  06/03/2021     US PARACENTESIS WITH ALBUMIN  06/14/2021     US PARACENTESIS WITH ALBUMIN  06/25/2021     US PARACENTESIS WITH ALBUMIN  07/05/2021      No Known Allergies   Social History     Tobacco Use     Smoking status: Never Smoker     Smokeless tobacco: Never Used   Substance Use Topics     Alcohol use: Not Currently     Comment: Quit 11/2019      Wt Readings from Last 1 Encounters:   08/11/21 57.4 kg (126 lb 9.6 oz)        Anesthesia Evaluation   Pt has had prior anesthetic.     No history of anesthetic complications       ROS/MED HX  ENT/Pulmonary:  - neg pulmonary ROS  (-) tobacco use, asthma and sleep apnea   Neurologic: Comment: Hx of seizure in setting of elevated ammonia 6/2020    RLS    Hx hepatic encephalopathy, on lactulose        (+) no peripheral neuropathy  (-) no CVA   Cardiovascular: Comment: Orthostatic hypotension, taking midodrine        (+) -----Previous cardiac testing   Echo: Date: 7/2020 Results:  1. Very small pericardial effusion (mostly posterior). No echocardiographic evidence of cardiac tamponade.   2. Normal left ventricular chamber size and systolic function. Calculated left ventricular ejection fraction is 65 %.   3. Normal right ventricular size and systolic function.   4. Normal inferior vena cava with decreased inspiratory collapse.   5. When compared to the previous echocardiographic images of 07/03/2020, pericardial effusion is smaller. HR has improved. Slight   septal bounce noted ( improved from prior echo). Ascites noted on both studies.    Stress Test: Date: 7/2020 Results:  Normal, low-risk dobutamine echocardiogram without evidence of ischemia at a   diagnostic workload (87% MPHR.)   The target heart rate was achieved.   Patient reported jaw pain during dobutamine infusion which resolved in the   recovery phase.   Normal biventricular size, thickness, and global systolic function at   baseline, LVEF=60-65%.   With  low-dose dobutamine, LVEF augmented and LV cavity size decreased   appropriately.   With peak dobutamine, LVEF increased further to >70% and LV cavity size   decreased appropriately.   No regional wall motion abnormalities at rest or with dobutamine.   No ECG evidence of ischemia. Normal heart rate and blood pressure response to   dobutamine.   No significant valvular abnormalities are noted on screening Doppler exam.   The aortic root and visualized ascending aorta are normal.   PASP cannot be estimated, however PA pressure is probably normal based on   normal PA acceleration time (180 msec.)     ECG Reviewed: Date: Results:    Cath: Date: Results:      METS/Exercise Tolerance: 4 - Raking leaves, gardening Comment: Walks 10-15,000 steps per day. Ascends stairs w/o difficulty. Alternates jogging & walking daily.   Hematologic:     (+) history of blood transfusion, no previous transfusion reaction,  (-) history of blood clots   Musculoskeletal: Comment: Chronic B/L elbow pain due to injury on steering wheel        GI/Hepatic: Comment: Alcoholic cirrhosis. Undergoes paracentesis every 11 days with 7L drained each time.    Hx GI bleed    MELD 17    (+) liver disease,  (-) GERD   Renal/Genitourinary: Comment: Creatinine 1.43, GFR 63 on 8/4/21    Hx dialysis, but not currently      (+) renal disease, type: CRI,     Endo:  - neg endo ROS  (-) Type II DM   Psychiatric/Substance Use: Comment: Sober since 11/2019      (+) alcohol abuse     Infectious Disease:  - neg infectious disease ROS     Malignancy:  - neg malignancy ROS     Other:  - neg other ROS          Physical Exam    Airway        Mallampati: I   TM distance: > 3 FB   Neck ROM: full   Mouth opening: > 3 cm    Respiratory Devices and Support         Dental  no notable dental history   Comment: Hx chipped left front upper tooth during hospitalization; s/p repair        Cardiovascular   cardiovascular exam normal          Pulmonary   pulmonary exam normal                 OUTSIDE LABS:  CBC:   Lab Results   Component Value Date    WBC 6.0 08/04/2021    WBC 4.9 01/12/2021    HGB 15.5 08/04/2021    HGB 13.4 01/12/2021    HCT 46.9 08/04/2021    HCT 41.7 01/12/2021     (L) 08/04/2021     (L) 01/12/2021     BMP:   Lab Results   Component Value Date     08/04/2021     01/12/2021    POTASSIUM 4.9 08/04/2021    POTASSIUM 5.4 (H) 01/12/2021    CHLORIDE 108 08/04/2021    CHLORIDE 108 01/12/2021    CO2 18 (L) 08/04/2021    CO2 23 01/12/2021    BUN 49 (H) 08/04/2021    BUN 48 (H) 01/12/2021    CR 1.43 (H) 08/04/2021    CR 1.39 (H) 01/12/2021    GLC 97 08/04/2021    GLC 93 01/12/2021     COAGS:   Lab Results   Component Value Date    INR 1.14 08/04/2021     POC: No results found for: BGM, HCG, HCGS  HEPATIC:   Lab Results   Component Value Date    ALBUMIN 3.4 08/04/2021    PROTTOTAL 6.3 (L) 08/04/2021    ALT 41 08/04/2021    AST 49 (H) 08/04/2021    ALKPHOS 154 (H) 08/04/2021    BILITOTAL 1.3 08/04/2021     OTHER:   Lab Results   Component Value Date    JILLIAN 8.5 08/04/2021    PHOS 3.3 03/10/2020    TSH 3.52 03/10/2020             PAC Discussion and Assessment    ASA Classification: 3  Case is suitable for: [place]  Anesthetic techniques and relevant risks discussed: GA  Invasive monitoring and risk discussed: No    Possibility and Risk of blood transfusion discussed: No            PAC Resident/NP Anesthesia Assessment: Christo Herrera is a 37 y/o male who presents for pre-operative H&P in preparation for ANESTHESIA OUT OF OR Paracentesis,  Transjugular intrahepatic portosystemic shunt (TIPS) Procedure  with Malu Pizarro MD on 8/27/21 at [facility] for treatment of Alcoholic cirrhosis of liver with ascites (H)    Pt has had prior anesthetic.  No history of anesthetic complications.  History of upper left front tooth damage during prior hospitalization, s/p restoration    He has the following specific operative  considerations:   # OLMAN 1/8 = low risk  # VTE risk: 0.26%  # Risk of PONV score = 2.  If > 2, anti-emetic intervention recommended.  # Anesthesia considerations:  Refer to PAC assessment in anesthesia records      CARDIAC: METS 4,  Walks 10-15,000 steps per day. Ascends stairs w/o difficulty. Alternates jogging & walking daily.     # RCRI : No serious cardiac risks.  0.4% risk of major adverse cardiac event.     #  Echo 7/2020:  EF 65%, normal function, very small pericardial effusion     #  Stress test 7/2020: No ischemia       PULMONARY:     # Never smoked    # Denies asthma or inhaler use    GI:     # Alcoholic cirrhosis. Undergoes paracentesis every 11 days with 7L drained each time. MELD 17    # Hx GI bleed    # Ventral hernia    /RENAL:     # Creatinine 1.43, GFR 63 on 8/4/21    # Hx dialysis, but not currently    ENDO: BMI 19  -  Will require careful positioning to protect bony prominences.    # No DM    HEME:     # INR 1.31 on 8/4/21    ORTHO:     # full ROM of neck    NEURO/PSYCH:     # Alcoholism, sober since 2019    # Hx of seizure in setting of elevated ammonia 6/2020    # Hx hepatic encephalopathy, on lactulose    # RLS      Patient is optimized and is acceptable candidate for the proposed procedure. No further diagnostic evaluation is needed.    Final plan per anesthesiologist on day of surgery.     Reviewed and Signed by PAC Mid-Level Provider/Resident  Mid-Level Provider/Resident: Aide Martínez PA-C  Date: 8/11/21  Time: 1637                               Aide Martínez PA-C

## 2021-08-16 ENCOUNTER — HOSPITAL ENCOUNTER (OUTPATIENT)
Dept: ULTRASOUND IMAGING | Facility: CLINIC | Age: 36
Discharge: HOME | End: 2021-08-16
Attending: INTERNAL MEDICINE | Admitting: INTERNAL MEDICINE
Payer: COMMERCIAL

## 2021-08-16 VITALS — SYSTOLIC BLOOD PRESSURE: 118 MMHG | DIASTOLIC BLOOD PRESSURE: 72 MMHG

## 2021-08-16 DIAGNOSIS — R18.8 OTHER ASCITES: ICD-10-CM

## 2021-08-16 PROCEDURE — 272N000021 US PARACENTESIS WITH ALBUMIN

## 2021-08-16 RX ORDER — ALBUMIN (HUMAN) 12.5 G/50ML
25-75 SOLUTION INTRAVENOUS ONCE
Status: COMPLETED | OUTPATIENT
Start: 2021-08-16 | End: 2021-08-16

## 2021-08-16 RX ADMIN — ALBUMIN (HUMAN) 50 G: 12.5 SOLUTION INTRAVENOUS at 13:55

## 2021-08-16 NOTE — PROGRESS NOTES
Patient tolerated paracentesis with albumin with no concerns expressed or observed.    8 L removed with 50 grams albumin given per order.    See doc flowsheet for BP

## 2021-08-17 ENCOUNTER — LAB (OUTPATIENT)
Dept: LAB | Facility: CLINIC | Age: 36
End: 2021-08-17
Payer: COMMERCIAL

## 2021-08-17 DIAGNOSIS — Z11.59 ENCOUNTER FOR SCREENING FOR OTHER VIRAL DISEASES: ICD-10-CM

## 2021-08-17 PROCEDURE — U0005 INFEC AGEN DETEC AMPLI PROBE: HCPCS

## 2021-08-17 PROCEDURE — U0003 INFECTIOUS AGENT DETECTION BY NUCLEIC ACID (DNA OR RNA); SEVERE ACUTE RESPIRATORY SYNDROME CORONAVIRUS 2 (SARS-COV-2) (CORONAVIRUS DISEASE [COVID-19]), AMPLIFIED PROBE TECHNIQUE, MAKING USE OF HIGH THROUGHPUT TECHNOLOGIES AS DESCRIBED BY CMS-2020-01-R: HCPCS

## 2021-08-18 LAB — SARS-COV-2 RNA RESP QL NAA+PROBE: NEGATIVE

## 2021-08-20 ENCOUNTER — HOSPITAL ENCOUNTER (OUTPATIENT)
Dept: ULTRASOUND IMAGING | Facility: CLINIC | Age: 36
Discharge: HOME | End: 2021-08-20
Attending: INTERNAL MEDICINE | Admitting: RADIOLOGY
Payer: COMMERCIAL

## 2021-08-20 DIAGNOSIS — Z11.59 ENCOUNTER FOR SCREENING FOR OTHER VIRAL DISEASES: ICD-10-CM

## 2021-08-20 DIAGNOSIS — R18.8 OTHER ASCITES: ICD-10-CM

## 2021-08-20 PROCEDURE — 272N000021 US PARACENTESIS WITH ALBUMIN

## 2021-08-20 PROCEDURE — 250N000011 HC RX IP 250 OP 636: Performed by: INTERNAL MEDICINE

## 2021-08-20 PROCEDURE — P9047 ALBUMIN (HUMAN), 25%, 50ML: HCPCS | Performed by: INTERNAL MEDICINE

## 2021-08-20 RX ORDER — ALBUMIN (HUMAN) 12.5 G/50ML
12.5-75 SOLUTION INTRAVENOUS ONCE
Status: COMPLETED | OUTPATIENT
Start: 2021-08-20 | End: 2021-08-20

## 2021-08-20 RX ADMIN — ALBUMIN HUMAN 25 G: 0.25 SOLUTION INTRAVENOUS at 11:29

## 2021-08-20 NOTE — PROGRESS NOTES
Patient tolerated paracentesis with albumin with no concerns expressed or observed.    3.1 L removed with 25 grams albumin given per order.

## 2021-08-21 ENCOUNTER — ORGAN (OUTPATIENT)
Dept: TRANSPLANT | Facility: CLINIC | Age: 36
End: 2021-08-21

## 2021-08-24 ENCOUNTER — LAB (OUTPATIENT)
Dept: LAB | Facility: CLINIC | Age: 36
End: 2021-08-24

## 2021-08-24 ENCOUNTER — HOSPITAL ENCOUNTER (OUTPATIENT)
Dept: ULTRASOUND IMAGING | Facility: CLINIC | Age: 36
Discharge: HOME | End: 2021-08-24
Attending: INTERNAL MEDICINE | Admitting: INTERNAL MEDICINE
Payer: COMMERCIAL

## 2021-08-24 DIAGNOSIS — Z11.59 ENCOUNTER FOR SCREENING FOR OTHER VIRAL DISEASES: ICD-10-CM

## 2021-08-24 DIAGNOSIS — R18.8 OTHER ASCITES: ICD-10-CM

## 2021-08-24 LAB — SARS-COV-2 RNA RESP QL NAA+PROBE: NEGATIVE

## 2021-08-24 PROCEDURE — 250N000011 HC RX IP 250 OP 636: Performed by: INTERNAL MEDICINE

## 2021-08-24 PROCEDURE — U0005 INFEC AGEN DETEC AMPLI PROBE: HCPCS

## 2021-08-24 PROCEDURE — U0003 INFECTIOUS AGENT DETECTION BY NUCLEIC ACID (DNA OR RNA); SEVERE ACUTE RESPIRATORY SYNDROME CORONAVIRUS 2 (SARS-COV-2) (CORONAVIRUS DISEASE [COVID-19]), AMPLIFIED PROBE TECHNIQUE, MAKING USE OF HIGH THROUGHPUT TECHNOLOGIES AS DESCRIBED BY CMS-2020-01-R: HCPCS

## 2021-08-24 PROCEDURE — 272N000021 US PARACENTESIS WITH ALBUMIN

## 2021-08-24 PROCEDURE — P9047 ALBUMIN (HUMAN), 25%, 50ML: HCPCS | Performed by: INTERNAL MEDICINE

## 2021-08-24 RX ORDER — ALBUMIN (HUMAN) 12.5 G/50ML
25-75 SOLUTION INTRAVENOUS ONCE
Status: COMPLETED | OUTPATIENT
Start: 2021-08-24 | End: 2021-08-24

## 2021-08-24 RX ADMIN — ALBUMIN HUMAN 25 G: 0.25 SOLUTION INTRAVENOUS at 11:33

## 2021-08-24 NOTE — IR NOTE
Patient tolerated paracentesis with albumin with no concerns expressed or observed.    5.8 L removed with 25 grams albumin given per order.

## 2021-08-25 DIAGNOSIS — K70.31 ALCOHOLIC CIRRHOSIS OF LIVER WITH ASCITES (H): Primary | ICD-10-CM

## 2021-08-25 DIAGNOSIS — Z11.59 ENCOUNTER FOR SCREENING FOR OTHER VIRAL DISEASES: ICD-10-CM

## 2021-08-26 ENCOUNTER — ANESTHESIA EVENT (OUTPATIENT)
Dept: SURGERY | Facility: CLINIC | Age: 36
End: 2021-08-26
Payer: COMMERCIAL

## 2021-08-26 ASSESSMENT — LIFESTYLE VARIABLES: TOBACCO_USE: 0

## 2021-08-26 NOTE — ANESTHESIA PREPROCEDURE EVALUATION
Anesthesia Pre-Procedure Evaluation    Patient: Christo Herrera   MRN: [id number] : 1985        Preoperative Diagnosis: * No surgery found *   Procedure : TIPS procedure     Past Medical History:   Diagnosis Date     Alcoholic cirrhosis of liver with ascites (H) 03/10/2020     Ascites      CKD (chronic kidney disease) stage 3, GFR 30-59 ml/min      Essential (primary) hypertension 2020     GERD (gastroesophageal reflux disease)      HE (hepatic encephalopathy) (H)      History of blood transfusion      History of ESRD requiring dialysis 2019    Resolved     Restless legs syndrome (RLS)       Past Surgical History:   Procedure Laterality Date     COLONOSCOPY      [facility] 2020     DENTAL SURGERY  2014    wisdom teeth     GI SURGERY       US PARACENTESIS WITH ALBUMIN  2020     US PARACENTESIS WITH ALBUMIN  2020     US PARACENTESIS WITH ALBUMIN  2020     US PARACENTESIS WITH ALBUMIN  2020     US PARACENTESIS WITH ALBUMIN  2020     US PARACENTESIS WITH ALBUMIN  10/05/2020     US PARACENTESIS WITH ALBUMIN  2020     US PARACENTESIS WITH ALBUMIN  2020     US PARACENTESIS WITH ALBUMIN  2020     US PARACENTESIS WITH ALBUMIN  2020     US PARACENTESIS WITH ALBUMIN  2020     US PARACENTESIS WITH ALBUMIN  2020     US PARACENTESIS WITH ALBUMIN  2020     US PARACENTESIS WITH ALBUMIN  2021     US PARACENTESIS WITH ALBUMIN  2021     US PARACENTESIS WITH ALBUMIN  2021     US PARACENTESIS WITH ALBUMIN  2021     US PARACENTESIS WITH ALBUMIN  2021     US PARACENTESIS WITH ALBUMIN  2021     US PARACENTESIS WITH ALBUMIN  2021     US PARACENTESIS WITH ALBUMIN  2021     US PARACENTESIS WITH ALBUMIN  2021     US PARACENTESIS WITH ALBUMIN  2021     US PARACENTESIS WITH ALBUMIN  2021     US PARACENTESIS WITH ALBUMIN  2021     US PARACENTESIS WITH ALBUMIN  2021     US  PARACENTESIS WITH ALBUMIN  05/24/2021      PARACENTESIS WITH ALBUMIN  06/03/2021      PARACENTESIS WITH ALBUMIN  06/14/2021     US PARACENTESIS WITH ALBUMIN  06/25/2021     US PARACENTESIS WITH ALBUMIN  07/05/2021      No Known Allergies   Social History     Tobacco Use     Smoking status: Never Smoker     Smokeless tobacco: Never Used   Substance Use Topics     Alcohol use: Not Currently     Comment: Quit 11/2019      Wt Readings from Last 1 Encounters:   08/11/21 57.4 kg (126 lb 9.6 oz)        Anesthesia Evaluation   Pt has had prior anesthetic. Type: General.    No history of anesthetic complications       ROS/MED HX  ENT/Pulmonary:  - neg pulmonary ROS  (-) tobacco use, asthma and sleep apnea   Neurologic: Comment: Hx of seizure in setting of elevated ammonia 6/2020    RLS    Hx hepatic encephalopathy, on lactulose        (+) no peripheral neuropathy  (-) no CVA   Cardiovascular: Comment: Orthostatic hypotension, taking midodrine        (+) Dyslipidemia hypertension-----Previous cardiac testing   Echo: Date: 7/2020 Results:  1. Very small pericardial effusion (mostly posterior). No echocardiographic evidence of cardiac tamponade.   2. Normal left ventricular chamber size and systolic function. Calculated left ventricular ejection fraction is 65 %.   3. Normal right ventricular size and systolic function.   4. Normal inferior vena cava with decreased inspiratory collapse.   5. When compared to the previous echocardiographic images of 07/03/2020, pericardial effusion is smaller. HR has improved. Slight   septal bounce noted ( improved from prior echo). Ascites noted on both studies.    Stress Test: Date: 7/2020 Results:  Normal, low-risk dobutamine echocardiogram without evidence of ischemia at a   diagnostic workload (87% MPHR.)   The target heart rate was achieved.   Patient reported jaw pain during dobutamine infusion which resolved in the   recovery phase.   Normal biventricular size, thickness, and global  systolic function at   baseline, LVEF=60-65%.   With low-dose dobutamine, LVEF augmented and LV cavity size decreased   appropriately.   With peak dobutamine, LVEF increased further to >70% and LV cavity size   decreased appropriately.   No regional wall motion abnormalities at rest or with dobutamine.   No ECG evidence of ischemia. Normal heart rate and blood pressure response to   dobutamine.   No significant valvular abnormalities are noted on screening Doppler exam.   The aortic root and visualized ascending aorta are normal.   PASP cannot be estimated, however PA pressure is probably normal based on   normal PA acceleration time (180 msec.)     ECG Reviewed: Date: Results:    Cath: Date: Results:      METS/Exercise Tolerance: 4 - Raking leaves, gardening Comment: Walks 10-15,000 steps per day. Ascends stairs w/o difficulty. Alternates jogging & walking daily.   Hematologic:     (+) history of blood transfusion, no previous transfusion reaction,  (-) history of blood clots   Musculoskeletal: Comment: Chronic B/L elbow pain due to injury on steering wheel        GI/Hepatic: Comment: Alcoholic cirrhosis. Undergoes paracentesis every 11 days with 7L drained each time.    Hx GI bleed    MELD 17    (+) liver disease,  (-) GERD   Renal/Genitourinary: Comment: Creatinine 1.43, GFR 63 on 8/4/21    Hx dialysis, but not currently      (+) renal disease, type: CRI,     Endo:  - neg endo ROS  (-) Type II DM   Psychiatric/Substance Use: Comment: Sober since 11/2019      (+) alcohol abuse     Infectious Disease: Comment: COVID NEGATIVE 8/24/21 - neg infectious disease ROS  (-) Recent Fever   Malignancy:  - neg malignancy ROS     Other:  - neg other ROS   (-) Any chance pregnant       Physical Exam    Airway        Mallampati: I   TM distance: > 3 FB   Neck ROM: full   Mouth opening: > 3 cm    Respiratory Devices and Support         Dental  no notable dental history   Comment: Hx chipped left front upper tooth during  hospitalization; s/p repair        Cardiovascular   cardiovascular exam normal          Pulmonary   pulmonary exam normal                OUTSIDE LABS:  CBC:   Lab Results   Component Value Date    WBC 6.0 08/04/2021    WBC 4.9 01/12/2021    HGB 15.5 08/04/2021    HGB 13.4 01/12/2021    HCT 46.9 08/04/2021    HCT 41.7 01/12/2021     (L) 08/04/2021     (L) 01/12/2021     BMP:   Lab Results   Component Value Date     08/04/2021     01/12/2021    POTASSIUM 4.9 08/04/2021    POTASSIUM 5.4 (H) 01/12/2021    CHLORIDE 108 08/04/2021    CHLORIDE 108 01/12/2021    CO2 18 (L) 08/04/2021    CO2 23 01/12/2021    BUN 49 (H) 08/04/2021    BUN 48 (H) 01/12/2021    CR 1.43 (H) 08/04/2021    CR 1.39 (H) 01/12/2021    GLC 97 08/04/2021    GLC 93 01/12/2021     COAGS:   Lab Results   Component Value Date    INR 1.14 08/04/2021     POC: No results found for: BGM, HCG, HCGS  HEPATIC:   Lab Results   Component Value Date    ALBUMIN 3.4 08/04/2021    PROTTOTAL 6.3 (L) 08/04/2021    ALT 41 08/04/2021    AST 49 (H) 08/04/2021    ALKPHOS 154 (H) 08/04/2021    BILITOTAL 1.3 08/04/2021     OTHER:   Lab Results   Component Value Date    JILLIAN 8.5 08/04/2021    PHOS 3.3 03/10/2020    TSH 3.52 03/10/2020       Anesthesia Plan    ASA Status:  3   NPO Status:  NPO Appropriate    Anesthesia Type: General.     - Airway: ETT   Induction: Intravenous.   Maintenance: Balanced.   Techniques and Equipment:     - Lines/Monitors: BIS, 2nd IV     Consents         - Extended Intubation/Ventilatory Support Discussed: No.      - Patient is DNR/DNI Status: No    Use of blood products discussed: Yes.     - Consented: consented to blood products            Reason for refusal: other.     Postoperative Care    Pain management: Multi-modal analgesia, IV analgesics.   PONV prophylaxis: Dexamethasone or Solumedrol, Ondansetron (or other 5HT-3)     Comments:                PAC Discussion and Assessment    ASA Classification: 3  Case is suitable  for: [place]  Anesthetic techniques and relevant risks discussed: GA  Invasive monitoring and risk discussed: No    Possibility and Risk of blood transfusion discussed: No            PAC Resident/NP Anesthesia Assessment: Christo Herrera is a 35 y/o male who presents for pre-operative H&P in preparation for ANESTHESIA OUT OF OR Paracentesis,  Transjugular intrahepatic portosystemic shunt (TIPS) Procedure  with Malu Pizarro MD on 8/27/21 at [facility] for treatment of Alcoholic cirrhosis of liver with ascites (H)    Pt has had prior anesthetic.  No history of anesthetic complications.  History of upper left front tooth damage during prior hospitalization, s/p restoration    He has the following specific operative considerations:   # OLMAN 1/8 = low risk  # VTE risk: 0.26%  # Risk of PONV score = 2.  If > 2, anti-emetic intervention recommended.  # Anesthesia considerations:  Refer to PAC assessment in anesthesia records      CARDIAC: METS 4,  Walks 10-15,000 steps per day. Ascends stairs w/o difficulty. Alternates jogging & walking daily.     # RCRI : No serious cardiac risks.  0.4% risk of major adverse cardiac event.     #  Echo 7/2020:  EF 65%, normal function, very small pericardial effusion     #  Stress test 7/2020: No ischemia       PULMONARY:     # Never smoked    # Denies asthma or inhaler use    GI:     # Alcoholic cirrhosis. Undergoes paracentesis every 11 days with 7L drained each time. MELD 17    # Hx GI bleed    # Ventral hernia    /RENAL:     # Creatinine 1.43, GFR 63 on 8/4/21    # Hx dialysis, but not currently    ENDO: BMI 19  -  Will require careful positioning to protect bony prominences.    # No DM    HEME:     # INR 1.31 on 8/4/21    ORTHO:     # full ROM of neck    NEURO/PSYCH:     # Alcoholism, sober since 2019    # Hx of seizure in setting of elevated ammonia 6/2020    # Hx hepatic encephalopathy, on lactulose    # RLS      Patient is  optimized and is acceptable candidate for the proposed procedure. No further diagnostic evaluation is needed.    Final plan per anesthesiologist on day of surgery.     Reviewed and Signed by PAC Mid-Level Provider/Resident  Mid-Level Provider/Resident: Aide Martínez PA-C  Date: 8/11/21  Time: 4164                               Preston Farrell MD

## 2021-08-27 ENCOUNTER — APPOINTMENT (OUTPATIENT)
Dept: INTERVENTIONAL RADIOLOGY/VASCULAR | Facility: CLINIC | Age: 36
End: 2021-08-27
Attending: RADIOLOGY
Payer: COMMERCIAL

## 2021-08-27 ENCOUNTER — ANESTHESIA (OUTPATIENT)
Dept: SURGERY | Facility: CLINIC | Age: 36
End: 2021-08-27
Payer: COMMERCIAL

## 2021-08-27 ENCOUNTER — HOSPITAL ENCOUNTER (OUTPATIENT)
Facility: CLINIC | Age: 36
Discharge: HOME | End: 2021-08-27
Attending: RADIOLOGY | Admitting: RADIOLOGY
Payer: COMMERCIAL

## 2021-08-27 VITALS
RESPIRATION RATE: 16 BRPM | TEMPERATURE: 97.8 F | OXYGEN SATURATION: 100 % | HEART RATE: 75 BPM | BODY MASS INDEX: 19.63 KG/M2 | SYSTOLIC BLOOD PRESSURE: 112 MMHG | HEIGHT: 66 IN | WEIGHT: 122.14 LBS | DIASTOLIC BLOOD PRESSURE: 77 MMHG

## 2021-08-27 DIAGNOSIS — K70.31 ALCOHOLIC CIRRHOSIS OF LIVER WITH ASCITES (H): Primary | ICD-10-CM

## 2021-08-27 LAB
ABO/RH(D): ABNORMAL
ALBUMIN SERPL-MCNC: 3.2 G/DL (ref 3.4–5)
ALP SERPL-CCNC: 156 U/L (ref 40–150)
ALT SERPL W P-5'-P-CCNC: 43 U/L (ref 0–70)
ANION GAP SERPL CALCULATED.3IONS-SCNC: 11 MMOL/L (ref 3–14)
ANTIBODY ID: NORMAL
ANTIBODY SCREEN: POSITIVE
APTT PPP: 32 SECONDS (ref 22–38)
AST SERPL W P-5'-P-CCNC: 50 U/L (ref 0–45)
BASOPHILS # BLD AUTO: 0.1 10E3/UL (ref 0–0.2)
BASOPHILS NFR BLD AUTO: 1 %
BILIRUB DIRECT SERPL-MCNC: 0.5 MG/DL (ref 0–0.2)
BILIRUB SERPL-MCNC: 1.1 MG/DL (ref 0.2–1.3)
BLD PROD TYP BPU: NORMAL
BLD PROD TYP BPU: NORMAL
BLOOD COMPONENT TYPE: NORMAL
BLOOD COMPONENT TYPE: NORMAL
BUN SERPL-MCNC: 50 MG/DL (ref 7–30)
CALCIUM SERPL-MCNC: 8.8 MG/DL (ref 8.5–10.1)
CHLORIDE BLD-SCNC: 110 MMOL/L (ref 94–109)
CO2 SERPL-SCNC: 12 MMOL/L (ref 20–32)
CODING SYSTEM: NORMAL
CODING SYSTEM: NORMAL
CREAT SERPL-MCNC: 1.42 MG/DL (ref 0.66–1.25)
CROSSMATCH: NORMAL
CROSSMATCH: NORMAL
E AG RBC QL: NORMAL
EOSINOPHIL # BLD AUTO: 0.5 10E3/UL (ref 0–0.7)
EOSINOPHIL NFR BLD AUTO: 8 %
ERYTHROCYTE [DISTWIDTH] IN BLOOD BY AUTOMATED COUNT: 14.8 % (ref 10–15)
GFR SERPL CREATININE-BSD FRML MDRD: 64 ML/MIN/1.73M2
GLUCOSE BLD-MCNC: 87 MG/DL (ref 70–99)
GLUCOSE BLDC GLUCOMTR-MCNC: 81 MG/DL (ref 70–99)
HCT VFR BLD AUTO: 49.4 % (ref 40–53)
HGB BLD-MCNC: 15.8 G/DL (ref 13.3–17.7)
IMM GRANULOCYTES # BLD: 0 10E3/UL
IMM GRANULOCYTES NFR BLD: 1 %
INR PPP: 1.24 (ref 0.85–1.15)
LYMPHOCYTES # BLD AUTO: 1.8 10E3/UL (ref 0.8–5.3)
LYMPHOCYTES NFR BLD AUTO: 28 %
MCH RBC QN AUTO: 31 PG (ref 26.5–33)
MCHC RBC AUTO-ENTMCNC: 32 G/DL (ref 31.5–36.5)
MCV RBC AUTO: 97 FL (ref 78–100)
MONOCYTES # BLD AUTO: 0.5 10E3/UL (ref 0–1.3)
MONOCYTES NFR BLD AUTO: 8 %
NEUTROPHILS # BLD AUTO: 3.5 10E3/UL (ref 1.6–8.3)
NEUTROPHILS NFR BLD AUTO: 54 %
NRBC # BLD AUTO: 0 10E3/UL
NRBC BLD AUTO-RTO: 0 /100
PLATELET # BLD AUTO: 102 10E3/UL (ref 150–450)
POTASSIUM BLD-SCNC: 4.9 MMOL/L (ref 3.4–5.3)
PROT SERPL-MCNC: 6 G/DL (ref 6.8–8.8)
RBC # BLD AUTO: 5.1 10E6/UL (ref 4.4–5.9)
SODIUM SERPL-SCNC: 133 MMOL/L (ref 133–144)
SPECIMEN EXPIRATION DATE: ABNORMAL
SPECIMEN EXPIRATION DATE: NORMAL
SPECIMEN EXPIRATION DATE: NORMAL
UNIT ABO/RH: NORMAL
UNIT ABO/RH: NORMAL
UNIT NUMBER: NORMAL
UNIT NUMBER: NORMAL
UNIT STATUS: NORMAL
UNIT STATUS: NORMAL
UNIT TYPE ISBT: 5100
UNIT TYPE ISBT: 9500
WBC # BLD AUTO: 6.4 10E3/UL (ref 4–11)

## 2021-08-27 PROCEDURE — 86905 BLOOD TYPING RBC ANTIGENS: CPT | Performed by: NURSE PRACTITIONER

## 2021-08-27 PROCEDURE — 82040 ASSAY OF SERUM ALBUMIN: CPT | Performed by: NURSE PRACTITIONER

## 2021-08-27 PROCEDURE — 86901 BLOOD TYPING SEROLOGIC RH(D): CPT | Performed by: NURSE PRACTITIONER

## 2021-08-27 PROCEDURE — 255N000002 HC RX 255 OP 636: Performed by: RADIOLOGY

## 2021-08-27 PROCEDURE — 710N000010 HC RECOVERY PHASE 1, LEVEL 2, PER MIN

## 2021-08-27 PROCEDURE — 86870 RBC ANTIBODY IDENTIFICATION: CPT | Performed by: NURSE PRACTITIONER

## 2021-08-27 PROCEDURE — 272N000507 HC NEEDLE CR7

## 2021-08-27 PROCEDURE — C1769 GUIDE WIRE: HCPCS

## 2021-08-27 PROCEDURE — 272N000302 HC DEVICE INFLATION CR5

## 2021-08-27 PROCEDURE — 250N000025 HC SEVOFLURANE, PER MIN

## 2021-08-27 PROCEDURE — 272N000497 HC NEEDLE CR16

## 2021-08-27 PROCEDURE — 258N000003 HC RX IP 258 OP 636: Performed by: NURSE ANESTHETIST, CERTIFIED REGISTERED

## 2021-08-27 PROCEDURE — 49083 ABD PARACENTESIS W/IMAGING: CPT

## 2021-08-27 PROCEDURE — 49083 ABD PARACENTESIS W/IMAGING: CPT | Mod: GC | Performed by: RADIOLOGY

## 2021-08-27 PROCEDURE — 85610 PROTHROMBIN TIME: CPT | Performed by: NURSE PRACTITIONER

## 2021-08-27 PROCEDURE — 250N000009 HC RX 250: Performed by: RADIOLOGY

## 2021-08-27 PROCEDURE — 86922 COMPATIBILITY TEST ANTIGLOB: CPT | Performed by: NURSE PRACTITIONER

## 2021-08-27 PROCEDURE — 36415 COLL VENOUS BLD VENIPUNCTURE: CPT | Performed by: NURSE PRACTITIONER

## 2021-08-27 PROCEDURE — 250N000009 HC RX 250: Performed by: NURSE ANESTHETIST, CERTIFIED REGISTERED

## 2021-08-27 PROCEDURE — 37182 INSERT HEPATIC SHUNT (TIPS): CPT

## 2021-08-27 PROCEDURE — 37182 INSERT HEPATIC SHUNT (TIPS): CPT | Performed by: RADIOLOGY

## 2021-08-27 PROCEDURE — 250N000011 HC RX IP 250 OP 636: Performed by: NURSE PRACTITIONER

## 2021-08-27 PROCEDURE — 85730 THROMBOPLASTIN TIME PARTIAL: CPT | Performed by: NURSE PRACTITIONER

## 2021-08-27 PROCEDURE — C1887 CATHETER, GUIDING: HCPCS

## 2021-08-27 PROCEDURE — 370N000017 HC ANESTHESIA TECHNICAL FEE, PER MIN

## 2021-08-27 PROCEDURE — 85025 COMPLETE CBC W/AUTO DIFF WBC: CPT | Performed by: NURSE PRACTITIONER

## 2021-08-27 PROCEDURE — 250N000011 HC RX IP 250 OP 636: Performed by: NURSE ANESTHETIST, CERTIFIED REGISTERED

## 2021-08-27 PROCEDURE — C1725 CATH, TRANSLUMIN NON-LASER: HCPCS

## 2021-08-27 PROCEDURE — C1874 STENT, COATED/COV W/DEL SYS: HCPCS

## 2021-08-27 PROCEDURE — 710N000012 HC RECOVERY PHASE 2, PER MINUTE

## 2021-08-27 PROCEDURE — 82248 BILIRUBIN DIRECT: CPT | Performed by: NURSE PRACTITIONER

## 2021-08-27 PROCEDURE — 37182 INSERT HEPATIC SHUNT (TIPS): CPT | Mod: GC | Performed by: RADIOLOGY

## 2021-08-27 PROCEDURE — 272N000500 HC NEEDLE CR2

## 2021-08-27 PROCEDURE — 250N000011 HC RX IP 250 OP 636: Performed by: RADIOLOGY

## 2021-08-27 PROCEDURE — 999N000141 HC STATISTIC PRE-PROCEDURE NURSING ASSESSMENT

## 2021-08-27 PROCEDURE — C1729 CATH, DRAINAGE: HCPCS

## 2021-08-27 PROCEDURE — 250N000011 HC RX IP 250 OP 636: Performed by: ANESTHESIOLOGY

## 2021-08-27 PROCEDURE — 272N000504 HC NEEDLE CR4

## 2021-08-27 RX ORDER — DIPHENHYDRAMINE HCL 25 MG
25 CAPSULE ORAL EVERY 6 HOURS PRN
Status: DISCONTINUED | OUTPATIENT
Start: 2021-08-27 | End: 2021-08-27 | Stop reason: HOSPADM

## 2021-08-27 RX ORDER — LABETALOL HYDROCHLORIDE 5 MG/ML
10 INJECTION, SOLUTION INTRAVENOUS
Status: DISCONTINUED | OUTPATIENT
Start: 2021-08-27 | End: 2021-08-27 | Stop reason: HOSPADM

## 2021-08-27 RX ORDER — ALBUTEROL SULFATE 0.83 MG/ML
2.5 SOLUTION RESPIRATORY (INHALATION) EVERY 4 HOURS PRN
Status: DISCONTINUED | OUTPATIENT
Start: 2021-08-27 | End: 2021-08-27 | Stop reason: HOSPADM

## 2021-08-27 RX ORDER — LIDOCAINE HYDROCHLORIDE 10 MG/ML
1-30 INJECTION, SOLUTION EPIDURAL; INFILTRATION; INTRACAUDAL; PERINEURAL
Status: COMPLETED | OUTPATIENT
Start: 2021-08-27 | End: 2021-08-27

## 2021-08-27 RX ORDER — ONDANSETRON 4 MG/1
4 TABLET, ORALLY DISINTEGRATING ORAL EVERY 30 MIN PRN
Status: COMPLETED | OUTPATIENT
Start: 2021-08-27 | End: 2021-08-27

## 2021-08-27 RX ORDER — AMPICILLIN AND SULBACTAM 2; 1 G/1; G/1
3 INJECTION, POWDER, FOR SOLUTION INTRAMUSCULAR; INTRAVENOUS ONCE
Status: COMPLETED | OUTPATIENT
Start: 2021-08-27 | End: 2021-08-27

## 2021-08-27 RX ORDER — IODIXANOL 320 MG/ML
100 INJECTION, SOLUTION INTRAVASCULAR ONCE
Status: DISCONTINUED | OUTPATIENT
Start: 2021-08-27 | End: 2021-08-27 | Stop reason: HOSPADM

## 2021-08-27 RX ORDER — LIDOCAINE HYDROCHLORIDE 20 MG/ML
INJECTION, SOLUTION INFILTRATION; PERINEURAL PRN
Status: DISCONTINUED | OUTPATIENT
Start: 2021-08-27 | End: 2021-08-27

## 2021-08-27 RX ORDER — HALOPERIDOL 5 MG/ML
1 INJECTION INTRAMUSCULAR
Status: DISCONTINUED | OUTPATIENT
Start: 2021-08-27 | End: 2021-08-27 | Stop reason: HOSPADM

## 2021-08-27 RX ORDER — FENTANYL CITRATE 50 UG/ML
25-50 INJECTION, SOLUTION INTRAMUSCULAR; INTRAVENOUS EVERY 5 MIN PRN
Status: DISCONTINUED | OUTPATIENT
Start: 2021-08-27 | End: 2021-08-27 | Stop reason: HOSPADM

## 2021-08-27 RX ORDER — HYDROMORPHONE HYDROCHLORIDE 1 MG/ML
.2-.4 INJECTION, SOLUTION INTRAMUSCULAR; INTRAVENOUS; SUBCUTANEOUS EVERY 5 MIN PRN
Status: DISCONTINUED | OUTPATIENT
Start: 2021-08-27 | End: 2021-08-27 | Stop reason: HOSPADM

## 2021-08-27 RX ORDER — ONDANSETRON 2 MG/ML
INJECTION INTRAMUSCULAR; INTRAVENOUS PRN
Status: DISCONTINUED | OUTPATIENT
Start: 2021-08-27 | End: 2021-08-27

## 2021-08-27 RX ORDER — HEPARIN SODIUM 200 [USP'U]/100ML
1 INJECTION, SOLUTION INTRAVENOUS CONTINUOUS PRN
Status: DISCONTINUED | OUTPATIENT
Start: 2021-08-27 | End: 2021-08-27 | Stop reason: HOSPADM

## 2021-08-27 RX ORDER — MEPERIDINE HYDROCHLORIDE 50 MG/ML
12.5 INJECTION INTRAMUSCULAR; INTRAVENOUS; SUBCUTANEOUS EVERY 5 MIN PRN
Status: DISCONTINUED | OUTPATIENT
Start: 2021-08-27 | End: 2021-08-27 | Stop reason: HOSPADM

## 2021-08-27 RX ORDER — SODIUM CHLORIDE, SODIUM LACTATE, POTASSIUM CHLORIDE, CALCIUM CHLORIDE 600; 310; 30; 20 MG/100ML; MG/100ML; MG/100ML; MG/100ML
INJECTION, SOLUTION INTRAVENOUS CONTINUOUS PRN
Status: DISCONTINUED | OUTPATIENT
Start: 2021-08-27 | End: 2021-08-27

## 2021-08-27 RX ORDER — DEXAMETHASONE SODIUM PHOSPHATE 4 MG/ML
INJECTION, SOLUTION INTRA-ARTICULAR; INTRALESIONAL; INTRAMUSCULAR; INTRAVENOUS; SOFT TISSUE PRN
Status: DISCONTINUED | OUTPATIENT
Start: 2021-08-27 | End: 2021-08-27

## 2021-08-27 RX ORDER — HYDRALAZINE HYDROCHLORIDE 20 MG/ML
2.5-5 INJECTION INTRAMUSCULAR; INTRAVENOUS EVERY 10 MIN PRN
Status: DISCONTINUED | OUTPATIENT
Start: 2021-08-27 | End: 2021-08-27 | Stop reason: HOSPADM

## 2021-08-27 RX ORDER — DIPHENHYDRAMINE HYDROCHLORIDE 50 MG/ML
25 INJECTION INTRAMUSCULAR; INTRAVENOUS EVERY 6 HOURS PRN
Status: DISCONTINUED | OUTPATIENT
Start: 2021-08-27 | End: 2021-08-27 | Stop reason: HOSPADM

## 2021-08-27 RX ORDER — SODIUM CHLORIDE, SODIUM LACTATE, POTASSIUM CHLORIDE, CALCIUM CHLORIDE 600; 310; 30; 20 MG/100ML; MG/100ML; MG/100ML; MG/100ML
INJECTION, SOLUTION INTRAVENOUS CONTINUOUS
Status: DISCONTINUED | OUTPATIENT
Start: 2021-08-27 | End: 2021-08-27 | Stop reason: HOSPADM

## 2021-08-27 RX ORDER — ONDANSETRON 8 MG/1
4 TABLET, FILM COATED ORAL EVERY 8 HOURS PRN
Qty: 15 TABLET | Refills: 0 | Status: SHIPPED | OUTPATIENT
Start: 2021-08-27 | End: 2023-10-31

## 2021-08-27 RX ORDER — FENTANYL CITRATE 50 UG/ML
INJECTION, SOLUTION INTRAMUSCULAR; INTRAVENOUS PRN
Status: DISCONTINUED | OUTPATIENT
Start: 2021-08-27 | End: 2021-08-27

## 2021-08-27 RX ORDER — ONDANSETRON 2 MG/ML
4 INJECTION INTRAMUSCULAR; INTRAVENOUS EVERY 30 MIN PRN
Status: COMPLETED | OUTPATIENT
Start: 2021-08-27 | End: 2021-08-27

## 2021-08-27 RX ORDER — LORAZEPAM 2 MG/ML
.5-1 INJECTION INTRAMUSCULAR
Status: DISCONTINUED | OUTPATIENT
Start: 2021-08-27 | End: 2021-08-27 | Stop reason: HOSPADM

## 2021-08-27 RX ORDER — IODIXANOL 320 MG/ML
200 INJECTION, SOLUTION INTRAVASCULAR ONCE
Status: COMPLETED | OUTPATIENT
Start: 2021-08-27 | End: 2021-08-27

## 2021-08-27 RX ORDER — OXYCODONE HYDROCHLORIDE 5 MG/1
5 TABLET ORAL EVERY 4 HOURS PRN
Status: DISCONTINUED | OUTPATIENT
Start: 2021-08-27 | End: 2021-08-27 | Stop reason: HOSPADM

## 2021-08-27 RX ORDER — PROPOFOL 10 MG/ML
INJECTION, EMULSION INTRAVENOUS PRN
Status: DISCONTINUED | OUTPATIENT
Start: 2021-08-27 | End: 2021-08-27

## 2021-08-27 RX ADMIN — AMPICILLIN SODIUM AND SULBACTAM SODIUM 3 G: 2; 1 INJECTION, POWDER, FOR SOLUTION INTRAMUSCULAR; INTRAVENOUS at 08:06

## 2021-08-27 RX ADMIN — ROCURONIUM BROMIDE 10 MG: 10 INJECTION INTRAVENOUS at 09:27

## 2021-08-27 RX ADMIN — FENTANYL CITRATE 50 MCG: 50 INJECTION, SOLUTION INTRAMUSCULAR; INTRAVENOUS at 07:58

## 2021-08-27 RX ADMIN — ONDANSETRON 4 MG: 2 INJECTION INTRAMUSCULAR; INTRAVENOUS at 12:26

## 2021-08-27 RX ADMIN — FENTANYL CITRATE 50 MCG: 50 INJECTION, SOLUTION INTRAMUSCULAR; INTRAVENOUS at 11:18

## 2021-08-27 RX ADMIN — ROCURONIUM BROMIDE 10 MG: 10 INJECTION INTRAVENOUS at 08:39

## 2021-08-27 RX ADMIN — AMPICILLIN SODIUM AND SULBACTAM SODIUM 1.5 G: 2; 1 INJECTION, POWDER, FOR SOLUTION INTRAMUSCULAR; INTRAVENOUS at 10:05

## 2021-08-27 RX ADMIN — DEXAMETHASONE SODIUM PHOSPHATE 4 MG: 4 INJECTION, SOLUTION INTRA-ARTICULAR; INTRALESIONAL; INTRAMUSCULAR; INTRAVENOUS; SOFT TISSUE at 08:06

## 2021-08-27 RX ADMIN — PROPOFOL 110 MG: 10 INJECTION, EMULSION INTRAVENOUS at 07:59

## 2021-08-27 RX ADMIN — ONDANSETRON 4 MG: 2 INJECTION INTRAMUSCULAR; INTRAVENOUS at 15:27

## 2021-08-27 RX ADMIN — LIDOCAINE HYDROCHLORIDE 7 ML: 10 INJECTION, SOLUTION EPIDURAL; INFILTRATION; INTRACAUDAL; PERINEURAL at 08:22

## 2021-08-27 RX ADMIN — ROCURONIUM BROMIDE 10 MG: 10 INJECTION INTRAVENOUS at 10:18

## 2021-08-27 RX ADMIN — SUGAMMADEX 150 MG: 100 INJECTION, SOLUTION INTRAVENOUS at 11:38

## 2021-08-27 RX ADMIN — HYDROMORPHONE HYDROCHLORIDE 0.2 MG: 1 INJECTION, SOLUTION INTRAMUSCULAR; INTRAVENOUS; SUBCUTANEOUS at 12:26

## 2021-08-27 RX ADMIN — HYDROMORPHONE HYDROCHLORIDE 0.3 MG: 1 INJECTION, SOLUTION INTRAMUSCULAR; INTRAVENOUS; SUBCUTANEOUS at 12:18

## 2021-08-27 RX ADMIN — ROCURONIUM BROMIDE 50 MG: 10 INJECTION INTRAVENOUS at 07:59

## 2021-08-27 RX ADMIN — IODIXANOL 175 ML: 320 INJECTION, SOLUTION INTRAVASCULAR at 12:08

## 2021-08-27 RX ADMIN — ONDANSETRON 4 MG: 2 INJECTION INTRAMUSCULAR; INTRAVENOUS at 11:26

## 2021-08-27 RX ADMIN — LIDOCAINE HYDROCHLORIDE 100 MG: 20 INJECTION, SOLUTION INFILTRATION; PERINEURAL at 07:58

## 2021-08-27 RX ADMIN — FENTANYL CITRATE 50 MCG: 50 INJECTION, SOLUTION INTRAMUSCULAR; INTRAVENOUS at 10:41

## 2021-08-27 RX ADMIN — MIDAZOLAM 2 MG: 1 INJECTION INTRAMUSCULAR; INTRAVENOUS at 07:50

## 2021-08-27 RX ADMIN — HEPARIN SODIUM 2 BAG: 200 INJECTION, SOLUTION INTRAVENOUS at 08:23

## 2021-08-27 RX ADMIN — SODIUM CHLORIDE, POTASSIUM CHLORIDE, SODIUM LACTATE AND CALCIUM CHLORIDE: 600; 310; 30; 20 INJECTION, SOLUTION INTRAVENOUS at 07:50

## 2021-08-27 ASSESSMENT — MIFFLIN-ST. JEOR: SCORE: 1431.75

## 2021-08-27 NOTE — ANESTHESIA POSTPROCEDURE EVALUATION
Patient: Christo Herrera    Procedure(s):  ANESTHESIA OUT OF OR Paracentesis,  Transjugular intrahepatic portosystemic shunt (TIPS) Procedure @0800    Diagnosis:Alcoholic cirrhosis of liver with ascites (H) [K70.31]  Diagnosis Additional Information: No value filed.    Anesthesia Type:  General    Note:  Disposition: Inpatient   Postop Pain Control: Uneventful            Sign Out: Well controlled pain   PONV: No   Neuro/Psych: Uneventful            Sign Out: Acceptable/Baseline neuro status   Airway/Respiratory: Uneventful            Sign Out: Acceptable/Baseline resp. status   CV/Hemodynamics: Uneventful            Sign Out: Acceptable CV status; No obvious hypovolemia; No obvious fluid overload   Other NRE: NONE   DID A NON-ROUTINE EVENT OCCUR? No           Last vitals:  Vitals Value Taken Time   /73 08/27/21 1315   Temp 36.4  C (97.5  F) 08/27/21 1151   Pulse 84 08/27/21 1319   Resp 16 08/27/21 1319   SpO2 99 % 08/27/21 1320   Vitals shown include unvalidated device data.    Electronically Signed By: Scott Uriostegui MD  August 27, 2021  2:22 PM

## 2021-08-27 NOTE — OR NURSING
"Pt on ordered bedrest until 1600. IR provider will see him in phase II around 1530, needs to place \"discharge patient\" order  "

## 2021-08-27 NOTE — PROCEDURES
[facility]    Procedure: IR Procedure Note    Date/Time: 8/27/2021 12:52 PM  Performed by: Malu Pizarro MD  Authorized by: Malu Pizarro MD   IR Fellow Physician:  Radiology Resident Physician: Av Waldron      UNIVERSAL PROTOCOL   Site Marked: NA  Prior Images Obtained and Reviewed:  Yes  Required items: Required blood products, implants, devices and special equipment available    Patient identity confirmed:  Verbally with patient, arm band, provided demographic data and hospital-assigned identification number  Patient was reevaluated immediately before administering moderate or deep sedation or anesthesia  Confirmation Checklist:  Patient's identity using two indicators, relevant allergies, procedure was appropriate and matched the consent or emergent situation and correct equipment/implants were available  Time out: Immediately prior to the procedure a time out was called    Universal Protocol: the Joint Commission Universal Protocol was followed    Preparation: Patient was prepped and draped in usual sterile fashion           ANESTHESIA    Anesthesia: Local infiltration  Local Anesthetic:  Lidocaine 1% without epinephrine      SEDATION    Patient Sedated: Yes    : General anesthesia.  Sedation:  See MAR for details  Vital signs: Vital signs monitored during sedation    See dictated procedure note for full details.  Findings: Successful TIPS placement    Specimens: none    Complications: None    Condition: Stable    Plan: Return to patient care unit    PROCEDURE   Patient Tolerance:  Patient tolerated the procedure well with no immediate complications    Length of time physician/provider present for 1:1 monitoring during sedation: 150 (See anesthesia note)

## 2021-08-27 NOTE — DISCHARGE INSTRUCTIONS
Radiology Discharge Instructions  __________________________________  1. No heavy lifting for 2 days (no greater than 10 pounds)  2. Resume usual diet - You may go back to your normal diet and medicines.  3. Can shower tomorrow  4. Dressing can come off at time of next shower  5. Pain killers as needed - You may take Advil (ibuprofen) or Tylenol (acetaminophen).  6. Interventional Radiology will call you and arrange your follow up     Call your doctor if you have:    A fever over 101  F (38.3  C), taken under the tongue.    Increased redness or swelling at the tube (catheter) site.    Take it easy when you get home.  Remember, same day surgery DOES NOT MEAN SAME DAY RECOVERY! Healing is a gradual process.   You will need some time to recover- you may be more tired than you realize at first.  Rest and relax for at least the first 24 hours at home.  You'll feel better and heal faster if you take good care of yourself.     ANESTHESIA RECOVERY -   For 24 hours after surgery    1. Get plenty of rest.  A responsible adult must stay with you for at least 24 hours after you leave the hospital.   2. Do not drive or use heavy equipment.  If you have weakness or tingling, don't drive or use heavy equipment until this feeling goes away.  3. Do not drink alcohol.  4. Avoid strenuous or risky activities.  Ask for help when climbing stairs.   5. You may feel lightheaded.  IF so, sit for a few minutes before standing.  Have someone help you get up.   6. If you have nausea (feel sick to your stomach): Drink only clear liquids such as apple juice, ginger ale, broth or 7-Up.  Rest may also help.  Be sure to drink enough fluids.  Move to a regular diet as you feel able.  7. You may have a slight fever. Call the doctor if your fever is over 100 F (37.7 C) (taken under the tongue) or lasts longer than 24 hours.  8. You may have a dry mouth, a sore throat, muscle aches or trouble sleeping.  These should go away after 24 hours.  9. Do not  make important or legal decisions.     Call your doctor for any of the followin.  Signs of infection (fever, growing tenderness at the surgery site, a large amount of drainage or bleeding, severe pain, foul-smelling drainage, redness, swelling).  2. It has been over 8 to 10 hours since surgery and you are still not able to urinate (pass water).  3.  Headache for over 24 hours.    To contact a doctor, call:    Interventional Radiology at [phone] or [phone] from 8 am to 5 pm. After hours call [phone] and ask for MD on call or page  [phone].    Emergency Department: [facility]: [phone] (TTY for hearing impaired: [phone])

## 2021-08-27 NOTE — ANESTHESIA PROCEDURE NOTES
Airway       Patient location during procedure: OR       Procedure Start/Stop Times: 8/27/2021 8:03 AM  Staff -        CRNA: Latanya Obrien APRN CRNA       Performed By: CRNAIndications and Patient Condition       Indications for airway management: jimmy-procedural       Induction type:intravenous       Mask difficulty assessment: 1 - vent by mask    Final Airway Details       Final airway type: endotracheal airway       Successful airway: ETT - single  Endotracheal Airway Details        ETT size (mm): 7.5       Cuffed: yes       Successful intubation technique: direct laryngoscopy       DL Blade Type: Marrero 2       Grade View of Cords: 1       Adjucts: stylet       Position: Left       Measured from: gums/teeth       Secured at (cm): 23       Bite block used: None    Post intubation assessment        Placement verified by: capnometry, equal breath sounds and chest rise        Number of attempts at approach: 1       Number of other approaches attempted: 0       Secured with: pink tape       Ease of procedure: easy       Dentition: Intact and Unchanged

## 2021-08-27 NOTE — ANESTHESIA CARE TRANSFER NOTE
Patient: Christo Herrera    Procedure(s):  ANESTHESIA OUT OF OR Paracentesis,  Transjugular intrahepatic portosystemic shunt (TIPS) Procedure @0800    Diagnosis: Alcoholic cirrhosis of liver with ascites (H) [K70.31]  Diagnosis Additional Information: No value filed.    Anesthesia Type:   General     Note:    Oropharynx: oropharynx clear of all foreign objects and spontaneously breathing  Level of Consciousness: awake  Oxygen Supplementation: face mask  Level of Supplemental Oxygen (L/min / FiO2): 6  Independent Airway: airway patency satisfactory and stable  Dentition: dentition unchanged  Vital Signs Stable: post-procedure vital signs reviewed and stable  Report to RN Given: handoff report given  Patient transferred to: PACU  Comments: VSS, report given to RN.    Handoff Report: Identifed the Patient, Identified the Reponsible Provider, Reviewed the pertinent medical history, Discussed the surgical course, Reviewed Intra-OP anesthesia mangement and issues during anesthesia, Set expectations for post-procedure period and Allowed opportunity for questions and acknowledgement of understanding      Vitals:  Vitals Value Taken Time   /72    Temp     Pulse 94    Resp 16    SpO2 100 % 08/27/21 1151   Vitals shown include unvalidated device data.    Electronically Signed By: RYLIE Dela Cruz CRNA  August 27, 2021  11:52 AM

## 2021-08-28 NOTE — OR NURSING
Discharge instructions provided and reviewed with Sherry (mother), designated caregiver. Printed after visit summary sent home with patient. Understanding verbalized.  1 script sent to home pharmacy for .

## 2021-08-31 DIAGNOSIS — Z11.59 ENCOUNTER FOR SCREENING FOR OTHER VIRAL DISEASES: ICD-10-CM

## 2021-09-01 ENCOUNTER — TELEPHONE (OUTPATIENT)
Dept: RADIOLOGY | Facility: CLINIC | Age: 36
End: 2021-09-01

## 2021-09-01 DIAGNOSIS — Z95.828 S/P TIPS (TRANSJUGULAR INTRAHEPATIC PORTOSYSTEMIC SHUNT): Primary | ICD-10-CM

## 2021-09-01 NOTE — TELEPHONE ENCOUNTER
I called and left a voicemail including my call back number.  I called to see how he was doing s/p TIPS placement.  Plan for one month f/up TIPS ultrasound and labs and visit with Dr Pizarro.  CLIFTON Araiza, RN, BSN  Interventional Radiology Nurse Coordinator   Phone:  [phone]  Christo returned my call.  He had a few days of being tired after TIPS placement.  He is increasing his activity.  He does feel that he has some fluid but not like normally does, he is scheduled for his next para on 9/7/21.  His neck was a little tender post procedure, it's feeling better.    CLIFTON Araiza RN, BSN  Interventional Radiology Nurse Coordinator   Phone:  [phone]

## 2021-09-07 ENCOUNTER — HOSPITAL ENCOUNTER (OUTPATIENT)
Dept: ULTRASOUND IMAGING | Facility: CLINIC | Age: 36
Discharge: HOME | End: 2021-09-07
Attending: INTERNAL MEDICINE | Admitting: INTERNAL MEDICINE
Payer: COMMERCIAL

## 2021-09-07 DIAGNOSIS — R18.8 OTHER ASCITES: ICD-10-CM

## 2021-09-07 PROCEDURE — 272N000021 US PARACENTESIS WITH ALBUMIN

## 2021-09-07 PROCEDURE — P9047 ALBUMIN (HUMAN), 25%, 50ML: HCPCS | Performed by: INTERNAL MEDICINE

## 2021-09-07 PROCEDURE — 250N000011 HC RX IP 250 OP 636: Performed by: INTERNAL MEDICINE

## 2021-09-07 RX ORDER — ALBUMIN (HUMAN) 12.5 G/50ML
25-75 SOLUTION INTRAVENOUS ONCE
Status: COMPLETED | OUTPATIENT
Start: 2021-09-07 | End: 2021-09-07

## 2021-09-07 RX ADMIN — ALBUMIN HUMAN 50 G: 0.25 SOLUTION INTRAVENOUS at 13:47

## 2021-09-07 NOTE — IR NOTE
Patient tolerated paracentesis with albumin with no concerns expressed or observed.    6.8 L removed with 50 grams albumin given per order.

## 2021-09-14 ENCOUNTER — LAB (OUTPATIENT)
Dept: LAB | Facility: CLINIC | Age: 36
End: 2021-09-14
Payer: COMMERCIAL

## 2021-09-14 DIAGNOSIS — Z11.59 ENCOUNTER FOR SCREENING FOR OTHER VIRAL DISEASES: ICD-10-CM

## 2021-09-14 PROCEDURE — U0003 INFECTIOUS AGENT DETECTION BY NUCLEIC ACID (DNA OR RNA); SEVERE ACUTE RESPIRATORY SYNDROME CORONAVIRUS 2 (SARS-COV-2) (CORONAVIRUS DISEASE [COVID-19]), AMPLIFIED PROBE TECHNIQUE, MAKING USE OF HIGH THROUGHPUT TECHNOLOGIES AS DESCRIBED BY CMS-2020-01-R: HCPCS

## 2021-09-14 PROCEDURE — U0005 INFEC AGEN DETEC AMPLI PROBE: HCPCS

## 2021-09-15 LAB — SARS-COV-2 RNA RESP QL NAA+PROBE: NEGATIVE

## 2021-09-17 ENCOUNTER — HOSPITAL ENCOUNTER (OUTPATIENT)
Dept: ULTRASOUND IMAGING | Facility: CLINIC | Age: 36
Discharge: HOME | End: 2021-09-17
Attending: INTERNAL MEDICINE | Admitting: INTERNAL MEDICINE
Payer: COMMERCIAL

## 2021-09-17 DIAGNOSIS — R18.8 OTHER ASCITES: ICD-10-CM

## 2021-09-17 PROCEDURE — 49083 ABD PARACENTESIS W/IMAGING: CPT

## 2021-09-17 PROCEDURE — 272N000706 US PARACENTESIS

## 2021-09-17 RX ORDER — ALBUMIN (HUMAN) 12.5 G/50ML
25-75 SOLUTION INTRAVENOUS ONCE
Status: COMPLETED | OUTPATIENT
Start: 2021-09-17 | End: 2021-09-17

## 2021-09-17 RX ADMIN — ALBUMIN (HUMAN) 50 G: 12.5 SOLUTION INTRAVENOUS at 11:38

## 2021-09-22 DIAGNOSIS — Z11.59 ENCOUNTER FOR SCREENING FOR OTHER VIRAL DISEASES: ICD-10-CM

## 2021-09-27 ENCOUNTER — HOSPITAL ENCOUNTER (OUTPATIENT)
Dept: ULTRASOUND IMAGING | Facility: CLINIC | Age: 36
Discharge: HOME | End: 2021-09-27
Attending: INTERNAL MEDICINE | Admitting: INTERNAL MEDICINE
Payer: COMMERCIAL

## 2021-09-27 DIAGNOSIS — R18.8 OTHER ASCITES: ICD-10-CM

## 2021-09-27 PROCEDURE — P9047 ALBUMIN (HUMAN), 25%, 50ML: HCPCS | Performed by: INTERNAL MEDICINE

## 2021-09-27 PROCEDURE — 250N000011 HC RX IP 250 OP 636: Performed by: INTERNAL MEDICINE

## 2021-09-27 PROCEDURE — 272N000706 US PARACENTESIS

## 2021-09-27 RX ORDER — ALBUMIN (HUMAN) 12.5 G/50ML
25-75 SOLUTION INTRAVENOUS ONCE
Status: COMPLETED | OUTPATIENT
Start: 2021-09-27 | End: 2021-09-27

## 2021-09-27 RX ADMIN — ALBUMIN (HUMAN) 50 G: 12.5 SOLUTION INTRAVENOUS at 12:50

## 2021-09-27 NOTE — IR NOTE
Patient tolerated paracentesis with albumin with no concerns expressed or observed.    8.6 L removed with 50 grams albumin given per order.

## 2021-10-04 ENCOUNTER — TELEPHONE (OUTPATIENT)
Dept: RADIOLOGY | Facility: CLINIC | Age: 36
End: 2021-10-04

## 2021-10-04 ENCOUNTER — LAB (OUTPATIENT)
Dept: LAB | Facility: CLINIC | Age: 36
End: 2021-10-04
Payer: COMMERCIAL

## 2021-10-04 DIAGNOSIS — Z11.59 ENCOUNTER FOR SCREENING FOR OTHER VIRAL DISEASES: ICD-10-CM

## 2021-10-04 PROCEDURE — U0005 INFEC AGEN DETEC AMPLI PROBE: HCPCS

## 2021-10-04 PROCEDURE — U0003 INFECTIOUS AGENT DETECTION BY NUCLEIC ACID (DNA OR RNA); SEVERE ACUTE RESPIRATORY SYNDROME CORONAVIRUS 2 (SARS-COV-2) (CORONAVIRUS DISEASE [COVID-19]), AMPLIFIED PROBE TECHNIQUE, MAKING USE OF HIGH THROUGHPUT TECHNOLOGIES AS DESCRIBED BY CMS-2020-01-R: HCPCS

## 2021-10-04 NOTE — TELEPHONE ENCOUNTER
Attempted to r/s 10/13 appointment to 10/6 @230, no answer, left message notifying with direct number.

## 2021-10-05 ENCOUNTER — ANCILLARY PROCEDURE (OUTPATIENT)
Dept: ULTRASOUND IMAGING | Facility: CLINIC | Age: 36
End: 2021-10-05
Attending: RADIOLOGY
Payer: COMMERCIAL

## 2021-10-05 ENCOUNTER — HOSPITAL ENCOUNTER (OUTPATIENT)
Dept: CARDIOLOGY | Facility: CLINIC | Age: 36
Discharge: HOME | End: 2021-10-05
Attending: INTERNAL MEDICINE | Admitting: INTERNAL MEDICINE
Payer: COMMERCIAL

## 2021-10-05 ENCOUNTER — LAB (OUTPATIENT)
Dept: LAB | Facility: CLINIC | Age: 36
End: 2021-10-05
Attending: INTERNAL MEDICINE
Payer: COMMERCIAL

## 2021-10-05 ENCOUNTER — COMMITTEE REVIEW (OUTPATIENT)
Dept: TRANSPLANT | Facility: CLINIC | Age: 36
End: 2021-10-05

## 2021-10-05 ENCOUNTER — OFFICE VISIT (OUTPATIENT)
Dept: GASTROENTEROLOGY | Facility: CLINIC | Age: 36
End: 2021-10-05
Attending: INTERNAL MEDICINE
Payer: COMMERCIAL

## 2021-10-05 ENCOUNTER — OFFICE VISIT (OUTPATIENT)
Dept: TRANSPLANT | Facility: CLINIC | Age: 36
End: 2021-10-05
Attending: TRANSPLANT SURGERY
Payer: COMMERCIAL

## 2021-10-05 VITALS
HEART RATE: 78 BPM | OXYGEN SATURATION: 98 % | WEIGHT: 144 LBS | DIASTOLIC BLOOD PRESSURE: 75 MMHG | HEIGHT: 68 IN | SYSTOLIC BLOOD PRESSURE: 114 MMHG | BODY MASS INDEX: 21.82 KG/M2

## 2021-10-05 DIAGNOSIS — Z95.828 S/P TIPS (TRANSJUGULAR INTRAHEPATIC PORTOSYSTEMIC SHUNT): ICD-10-CM

## 2021-10-05 DIAGNOSIS — K70.31 ALCOHOLIC CIRRHOSIS OF LIVER WITH ASCITES (H): ICD-10-CM

## 2021-10-05 DIAGNOSIS — K76.82 HEPATIC ENCEPHALOPATHY (H): ICD-10-CM

## 2021-10-05 DIAGNOSIS — K76.6 PORTAL HYPERTENSION (H): ICD-10-CM

## 2021-10-05 DIAGNOSIS — K70.10 ACUTE ALCOHOLIC LIVER DISEASE (H): Primary | ICD-10-CM

## 2021-10-05 DIAGNOSIS — K70.31 ALCOHOLIC CIRRHOSIS OF LIVER WITH ASCITES (H): Primary | ICD-10-CM

## 2021-10-05 LAB
ALBUMIN SERPL-MCNC: 2.8 G/DL (ref 3.4–5)
ALP SERPL-CCNC: 157 U/L (ref 40–150)
ALT SERPL W P-5'-P-CCNC: 27 U/L (ref 0–70)
ANION GAP SERPL CALCULATED.3IONS-SCNC: 7 MMOL/L (ref 3–14)
AST SERPL W P-5'-P-CCNC: 43 U/L (ref 0–45)
BILIRUB DIRECT SERPL-MCNC: 0.7 MG/DL (ref 0–0.2)
BILIRUB SERPL-MCNC: 1.7 MG/DL (ref 0.2–1.3)
BUN SERPL-MCNC: 22 MG/DL (ref 7–30)
CALCIUM SERPL-MCNC: 8.7 MG/DL (ref 8.5–10.1)
CHLORIDE BLD-SCNC: 109 MMOL/L (ref 94–109)
CO2 SERPL-SCNC: 26 MMOL/L (ref 20–32)
CREAT SERPL-MCNC: 1.23 MG/DL (ref 0.66–1.25)
ERYTHROCYTE [DISTWIDTH] IN BLOOD BY AUTOMATED COUNT: 14.5 % (ref 10–15)
GFR SERPL CREATININE-BSD FRML MDRD: 76 ML/MIN/1.73M2
GLUCOSE BLD-MCNC: 88 MG/DL (ref 70–99)
HCT VFR BLD AUTO: 44 % (ref 40–53)
HGB BLD-MCNC: 14.2 G/DL (ref 13.3–17.7)
INR PPP: 1.17 (ref 0.85–1.15)
MCH RBC QN AUTO: 31.2 PG (ref 26.5–33)
MCHC RBC AUTO-ENTMCNC: 32.3 G/DL (ref 31.5–36.5)
MCV RBC AUTO: 97 FL (ref 78–100)
PLATELET # BLD AUTO: 120 10E3/UL (ref 150–450)
POTASSIUM BLD-SCNC: 4.2 MMOL/L (ref 3.4–5.3)
PROT SERPL-MCNC: 5.3 G/DL (ref 6.8–8.8)
RBC # BLD AUTO: 4.55 10E6/UL (ref 4.4–5.9)
SARS-COV-2 RNA RESP QL NAA+PROBE: NEGATIVE
SODIUM SERPL-SCNC: 142 MMOL/L (ref 133–144)
WBC # BLD AUTO: 4.8 10E3/UL (ref 4–11)

## 2021-10-05 PROCEDURE — 93350 STRESS TTE ONLY: CPT | Mod: 26 | Performed by: STUDENT IN AN ORGANIZED HEALTH CARE EDUCATION/TRAINING PROGRAM

## 2021-10-05 PROCEDURE — 93018 CV STRESS TEST I&R ONLY: CPT | Performed by: STUDENT IN AN ORGANIZED HEALTH CARE EDUCATION/TRAINING PROGRAM

## 2021-10-05 PROCEDURE — 250N000011 HC RX IP 250 OP 636: Performed by: STUDENT IN AN ORGANIZED HEALTH CARE EDUCATION/TRAINING PROGRAM

## 2021-10-05 PROCEDURE — 255N000002 HC RX 255 OP 636: Performed by: STUDENT IN AN ORGANIZED HEALTH CARE EDUCATION/TRAINING PROGRAM

## 2021-10-05 PROCEDURE — 93975 VASCULAR STUDY: CPT | Performed by: RADIOLOGY

## 2021-10-05 PROCEDURE — C8928 TTE W OR W/O FOL W/CON,STRES: HCPCS

## 2021-10-05 PROCEDURE — 85610 PROTHROMBIN TIME: CPT | Performed by: PATHOLOGY

## 2021-10-05 PROCEDURE — 93321 DOPPLER ECHO F-UP/LMTD STD: CPT | Mod: 26 | Performed by: STUDENT IN AN ORGANIZED HEALTH CARE EDUCATION/TRAINING PROGRAM

## 2021-10-05 PROCEDURE — 36415 COLL VENOUS BLD VENIPUNCTURE: CPT | Performed by: PATHOLOGY

## 2021-10-05 PROCEDURE — 999N000208 ECHO STRESS ECHOCARDIOGRAM

## 2021-10-05 PROCEDURE — 80053 COMPREHEN METABOLIC PANEL: CPT | Performed by: PATHOLOGY

## 2021-10-05 PROCEDURE — 93325 DOPPLER ECHO COLOR FLOW MAPG: CPT | Mod: 26 | Performed by: STUDENT IN AN ORGANIZED HEALTH CARE EDUCATION/TRAINING PROGRAM

## 2021-10-05 PROCEDURE — 99213 OFFICE O/P EST LOW 20 MIN: CPT | Performed by: INTERNAL MEDICINE

## 2021-10-05 PROCEDURE — 80321 ALCOHOLS BIOMARKERS 1OR 2: CPT | Mod: 90 | Performed by: PATHOLOGY

## 2021-10-05 PROCEDURE — 82248 BILIRUBIN DIRECT: CPT | Performed by: PATHOLOGY

## 2021-10-05 PROCEDURE — 99214 OFFICE O/P EST MOD 30 MIN: CPT | Performed by: TRANSPLANT SURGERY

## 2021-10-05 PROCEDURE — 93016 CV STRESS TEST SUPVJ ONLY: CPT | Performed by: STUDENT IN AN ORGANIZED HEALTH CARE EDUCATION/TRAINING PROGRAM

## 2021-10-05 PROCEDURE — G0463 HOSPITAL OUTPT CLINIC VISIT: HCPCS

## 2021-10-05 PROCEDURE — 250N000009 HC RX 250: Performed by: STUDENT IN AN ORGANIZED HEALTH CARE EDUCATION/TRAINING PROGRAM

## 2021-10-05 PROCEDURE — 85027 COMPLETE CBC AUTOMATED: CPT | Performed by: PATHOLOGY

## 2021-10-05 RX ORDER — METOPROLOL TARTRATE 1 MG/ML
1-20 INJECTION, SOLUTION INTRAVENOUS
Status: ACTIVE | OUTPATIENT
Start: 2021-10-05 | End: 2021-10-05

## 2021-10-05 RX ORDER — ATROPINE SULFATE 0.4 MG/ML
.2-2 AMPUL (ML) INJECTION
Status: COMPLETED | OUTPATIENT
Start: 2021-10-05 | End: 2021-10-05

## 2021-10-05 RX ORDER — DOBUTAMINE HYDROCHLORIDE 200 MG/100ML
10-50 INJECTION INTRAVENOUS CONTINUOUS
Status: ACTIVE | OUTPATIENT
Start: 2021-10-05 | End: 2021-10-05

## 2021-10-05 RX ADMIN — METOPROLOL TARTRATE 4 MG: 1 INJECTION, SOLUTION INTRAVENOUS at 11:35

## 2021-10-05 RX ADMIN — DOBUTAMINE HYDROCHLORIDE 10 MCG/KG/MIN: 200 INJECTION INTRAVENOUS at 11:21

## 2021-10-05 RX ADMIN — HUMAN ALBUMIN MICROSPHERES AND PERFLUTREN 7 ML: 10; .22 INJECTION, SOLUTION INTRAVENOUS at 11:35

## 2021-10-05 RX ADMIN — ATROPINE SULFATE 0.6 MG: 0.4 INJECTION, SOLUTION INTRAMUSCULAR; INTRAVENOUS; SUBCUTANEOUS at 11:29

## 2021-10-05 ASSESSMENT — MIFFLIN-ST. JEOR: SCORE: 1558.19

## 2021-10-05 NOTE — PROGRESS NOTES
[facility]    Hepatology follow-up    CONSULTING HEALTHCARE PROVIDER:  Luda Caraballo MD    CHIEF COMPLAINT AND REASON FOR VISIT:  Decompensated alcoholic liver disease.    SUBJECTIVE:  Mr. Herrera is a 36-year-old male with a history of alcohol abuse disorder and he was seen by us for alcoholic hepatitis/alcoholic cirrhosis.  He decompensated and his main problem was the fluid retention with ascites, had also hepatic encephalopathy.  Mr. Herrera also had some acute kidney injury, which has improved.  He has been on dialysis in 08/2020, although he is no longer doing that.      Because of his ascites , we sent him for evaluation for TIPS and in fact, he did have the TIPS on 08/27/2021.  Since then, his fluid got better but he still requires therapeutic paracentesis.  He had an abdominal ultrasound with Dopplers today and it was read as patent TIPS with elevated velocities of 247.  The patient is supposed to meet with Dr. Pizarro anyway tomorrow.      Otherwise, he did lose a lot of muscle mass since we followed him here, but now since the TIPS he claims that he might be gaining it back.  He has no edema, has some abdominal discomfort.  He has also fatigue.  He is taking his lactulose and rifaximin and appears to be very lucid.  He is having bowel movements, like 3 a day with no blood in it, and he has done the vaccination for the COVID with Pfizer.      Medical hx Surgical hx   Past Medical History:   Diagnosis Date     Alcoholic cirrhosis of liver with ascites (H) 03/10/2020     Ascites      CKD (chronic kidney disease) stage 3, GFR 30-59 ml/min (H)      Essential (primary) hypertension 09/17/2020     GERD (gastroesophageal reflux disease)      HE (hepatic encephalopathy) (H)      History of blood transfusion      History of ESRD requiring dialysis 12/2019    Resolved     Restless legs syndrome (RLS)       Past Surgical History:   Procedure Laterality Date     COLONOSCOPY       [facility] 2020     DENTAL SURGERY  2014    wisdom teeth     GI SURGERY       IR PARACENTESIS  8/27/2021     IR TRANSVEN INTRAHEPATIC PORTOSYST SHUNT  8/27/2021     US PARACENTESIS WITH ALBUMIN  04/02/2020     US PARACENTESIS WITH ALBUMIN  04/21/2020     US PARACENTESIS WITH ALBUMIN  06/04/2020     US PARACENTESIS WITH ALBUMIN  06/19/2020     US PARACENTESIS WITH ALBUMIN  07/29/2020     US PARACENTESIS WITH ALBUMIN  10/05/2020     US PARACENTESIS WITH ALBUMIN  11/03/2020     US PARACENTESIS WITH ALBUMIN  11/11/2020     US PARACENTESIS WITH ALBUMIN  11/24/2020     US PARACENTESIS WITH ALBUMIN  12/03/2020     US PARACENTESIS WITH ALBUMIN  12/11/2020     US PARACENTESIS WITH ALBUMIN  12/18/2020     US PARACENTESIS WITH ALBUMIN  12/30/2020     US PARACENTESIS WITH ALBUMIN  01/07/2021     US PARACENTESIS WITH ALBUMIN  01/18/2021     US PARACENTESIS WITH ALBUMIN  01/27/2021     US PARACENTESIS WITH ALBUMIN  02/05/2021     US PARACENTESIS WITH ALBUMIN  02/16/2021     US PARACENTESIS WITH ALBUMIN  02/26/2021     US PARACENTESIS WITH ALBUMIN  03/12/2021     US PARACENTESIS WITH ALBUMIN  03/22/2021     US PARACENTESIS WITH ALBUMIN  04/01/2021     US PARACENTESIS WITH ALBUMIN  04/12/2021     US PARACENTESIS WITH ALBUMIN  04/23/2021     US PARACENTESIS WITH ALBUMIN  05/04/2021     US PARACENTESIS WITH ALBUMIN  05/14/2021     US PARACENTESIS WITH ALBUMIN  05/24/2021     US PARACENTESIS WITH ALBUMIN  06/03/2021     US PARACENTESIS WITH ALBUMIN  06/14/2021     US PARACENTESIS WITH ALBUMIN  06/25/2021     US PARACENTESIS WITH ALBUMIN  07/05/2021          Medications  Prior to Admission medications    Medication Sig Start Date End Date Taking? Authorizing Provider   gabapentin (NEURONTIN) 100 MG capsule Take 100 mg by mouth nightly as needed  8/11/20  Yes Reported, Patient   guaiFENesin (MUCINEX) 600 MG 12 hr tablet Take 600 mg by mouth 2 times daily as needed for congestion or cough  2/15/20  Yes Reported, Patient   lactulose  (CHRONULAC) 10 GM/15ML solution Take 10 g by mouth daily as needed  2/15/20  Yes Reported, Patient   Menthol-Methyl Salicylate (ICY HOT EXTRA STRENGTH) 10-30 % CREA Apply topically daily as needed    Yes Reported, Patient   midodrine (PROAMATINE) 5 MG tablet Take 5 mg by mouth 3 times daily  2/15/20  Yes Reported, Patient   Multiple Vitamins-Minerals (SUPER THERA PRERNA M) TABS Take 1 tablet by mouth every morning  12/29/19  Yes Reported, Patient   ondansetron (ZOFRAN) 8 MG tablet Take 0.5 tablets (4 mg) by mouth every 8 hours as needed for nausea 8/27/21  Yes Av Waldron MD   Psyllium 58.12 % PACK Take by mouth daily as needed    Yes Reported, Patient   rifaximin (XIFAXAN) 550 MG TABS tablet Take 1 tablet (550 mg) by mouth 2 times daily  Patient taking differently: Take 550 mg by mouth every morning  5/24/21  Yes Brandin Echavarria MD       Allergies  Allergies   Allergen Reactions     Blood Transfusion Related (Informational Only) Other (See Comments)     Patient has a history of a clinically significant antibody against RBC antigens.  A delay in compatible RBCs may occur.        Review of systems  A 10-point review of systems was negative    Examination  There were no vitals taken for this visit.  There is no height or weight on file to calculate BMI.    Gen- well, NAD, A+Ox3, normal color  Lym- no palpable LAD  CVS- RRR  RS- CTA  Abd- Distended with shifting dullness.  Extr- hands normal, no LARISSA  Skin- no rash or jaundice  Neuro- no asterixis  Psych- normal mood    Laboratory  Lab Results   Component Value Date     10/05/2021     01/12/2021    POTASSIUM 4.2 10/05/2021    POTASSIUM 5.4 01/12/2021    CHLORIDE 109 10/05/2021    CHLORIDE 108 01/12/2021    CO2 26 10/05/2021    CO2 23 01/12/2021    BUN 22 10/05/2021    BUN 48 01/12/2021    CR 1.23 10/05/2021    CR 1.39 01/12/2021       Lab Results   Component Value Date    BILITOTAL 1.7 10/05/2021    BILITOTAL 1.2 01/12/2021    ALT 27  10/05/2021    ALT 32 01/12/2021    AST 43 10/05/2021    AST 43 01/12/2021    ALKPHOS 157 10/05/2021    ALKPHOS 128 01/12/2021       Lab Results   Component Value Date    ALBUMIN 2.8 10/05/2021    ALBUMIN 3.3 01/12/2021    PROTTOTAL 5.3 10/05/2021    PROTTOTAL 6.1 01/12/2021        Lab Results   Component Value Date    WBC 4.8 10/05/2021    WBC 4.9 01/12/2021    HGB 14.2 10/05/2021    HGB 13.4 01/12/2021    MCV 97 10/05/2021    MCV 95 01/12/2021     10/05/2021     01/12/2021       Lab Results   Component Value Date    INR 1.17 10/05/2021    INR 1.2 07/20/2021    INR 1.31 01/12/2021     MELD-Na score: 12 at 10/5/2021  8:29 AM  MELD score: 12 at 10/5/2021  8:29 AM  Calculated from:  Serum Creatinine: 1.23 mg/dL at 10/5/2021  8:29 AM  Serum Sodium: 142 mmol/L (Using max of 137 mmol/L) at 10/5/2021  8:29 AM  Total Bilirubin: 1.7 mg/dL at 10/5/2021  8:29 AM  INR(ratio): 1.17 at 10/5/2021  8:29 AM  Age: 36 years    Radiology    ASSESSMENT AND PLAN:  Alcoholic liver disease.  Mr. Herrera has alcoholic liver disease and his main problem was the fluid retention.  He had TIPS done as his MELD score was very low.  We thought that this could be a bridge to transplantation as the patient was interested in proceeding with living donor liver transplantation.  It was not a destination therapy.  Regardless, he has minimal hepatic encephalopathy initially and we gave him rifaximin and lactulose and he is doing quite well and is very lucid.     Living donor liver transplant candidacy: He was seen also by our transplant surgeons today and they will give us an idea of the appropriateness of him going forward with living donor liver transplantation.      Besides that, we will continue doing surveillance for HCC and he will be seen here in 3 months.  Otherwise, he is going to meet with Rowan to review his Dopplers of the TIPS done today and see if he needs revision.  His native MELD score is 12.    Brandin Pugh  MD [place]  Hepatology  [facility]

## 2021-10-05 NOTE — COMMITTEE REVIEW
Abdominal Committee Review Note     Evaluation Date: 3/10/2020  Committee Review Date: 10/5/2021    Organ being evaluated for: Liver    Transplant Phase: Waitlist  Transplant Status: Active    Transplant Coordinator: Christo Alcala Jr.  Transplant Surgeon:   Josr Broderick    Referring Physician: Luda Caraballo    Primary Diagnosis: Alcoholic Cirrhosis  Secondary Diagnosis:     Committee Review Members:  Nutrition Chanel Cardenas, RD   Pharmacist Brayan De La Rosa, [facility]    - Clinical Oneal Lopez, [facility] [place] [place]   Transplant Elena Green, HARSHAD, Sylvia Gary, RN, Janine Dockery, RN, Pennie Celeste, HARSHAD, Jr Christo Alcala, HARSHAD, Sherry Cormier MD, Savita Oconnell APRN CNP   Transplant Hepatology  Fang Robertson MD, Félix Thomas MD, Karen Alicia MD, Brandin Echavarria MD, Julien Muñiz MD, Thomas M. Leventhal, MD   Transplant Surgery Carlos Enrique Rodríguez MD, Josr Broderick MD, Elier Rothman MD, Kassidy Sigala MD       Transplant Eligibility: Cirrhosis with MELD, ETOH    Committee Review Decision: Remain Active    Relative Contraindications: None    Absolute Contraindications: None    Committee Chair Sherry Cormier MD verbally attested to the committee's decision.    Committee Discussion Details:     Approved for LRD liver

## 2021-10-05 NOTE — LETTER
10/5/2021         RE: Christo Herrera  [address] [place] [address]        Dear Colleague,    Thank you for referring your patient, Christo Herrera, to the [facility]. Please see a copy of my visit note below.    Assessment and Plan:  1. liver transplant evaluation - patient is a good candidate overall. Benefits and surgical risks of a liver transplantation were discussed.  2.  End stage liver disease due to Laennec's    Surgical evaluation:  1. Portal Vein:Patent  2. Hepatic Artery: Open  3. TIPS: present  4. Previous Abdominal Surgery: No  5. Hepatocellular Carcinoma: None  6. Ascites: Present - large amount  7. Costal Angle: narrow  8. Portopulmonary Hypertension: absent  9. Hepatopulmonary Syndrome: absent  10. Cardiac Evaluation: needs stress echocardiogram  11. Nutritional Status: Moderate  12. Diabetes: no  13.Hypertension no  14. Smoker:rarely in the past, none currently  14: Fraility index:not frail  15. Meets guidelines to receive Living Donor  Yes -     16. Potential Living donors Yes -  looking within [employer] group    Recommendations: overall a good candidate for liver transplant, MELD 17 but lower now.  Would be a good candidate for living donor liver transplant or DCD      Patients overall evaluation will be discussed at the Liver Transplant selection committee meeting with a final recommendation on the patients suitability for transplant to be made at that time.    Consult Full  Details:  Christo Herrera was seen in consultation at the request of Dr. Echavarria for evaluation as a potential liver transplant recipient.    Reason for Visit:  Christo Herrera is a 36 year old year old male with Laennec's, who presents for liver transplant evaluation.    HPI:  Presenting complaint: Abdominal distention    35 y/o EtOH cirrhosis, prior history of heavy drinking leading to alcoholic hepatitis/decompensation 2019 2-3 hosp stay resulting in multiple admissions to the  hospital over the next year.  Stopped drinking 11/2019, brief inpatient rehab  8/2020- [facility], transiently on HD, more recently has developed refractory ascites,  Requiring paracentesis Q7-10 days of ~8L each time. 8/27- TIPS but still has ascites, may need revision     PMH  Cirrhosis- EtOH, refractory ascites    PSH  No abdominal surg    Soc  Lives in [place] with mother  Worked at [employer] previously now not working  EtOH- as above, quit 11/2019, 1 prior DUI  Tob- rarely in past, none currently  No drugs    Fam  Mother- alive relatively healthy  Father- MI in 50's, alive  3 Bro's - all healthy  No liver disease, liver cancer    Past Medical History:   Diagnosis Date     Alcoholic cirrhosis of liver with ascites (H) 03/10/2020     Ascites      CKD (chronic kidney disease) stage 3, GFR 30-59 ml/min (H)      Essential (primary) hypertension 09/17/2020     GERD (gastroesophageal reflux disease)      HE (hepatic encephalopathy) (H)      History of blood transfusion      History of ESRD requiring dialysis 12/2019    Resolved     Restless legs syndrome (RLS)      Past Surgical History:   Procedure Laterality Date     COLONOSCOPY      [facility] 2020     DENTAL SURGERY  2014    wisdom teeth     GI SURGERY       IR PARACENTESIS  8/27/2021     IR TRANSVEN INTRAHEPATIC PORTOSYST SHUNT  8/27/2021     US PARACENTESIS WITH ALBUMIN  04/02/2020     US PARACENTESIS WITH ALBUMIN  04/21/2020     US PARACENTESIS WITH ALBUMIN  06/04/2020     US PARACENTESIS WITH ALBUMIN  06/19/2020     US PARACENTESIS WITH ALBUMIN  07/29/2020     US PARACENTESIS WITH ALBUMIN  10/05/2020     US PARACENTESIS WITH ALBUMIN  11/03/2020     US PARACENTESIS WITH ALBUMIN  11/11/2020     US PARACENTESIS WITH ALBUMIN  11/24/2020     US PARACENTESIS WITH ALBUMIN  12/03/2020     US PARACENTESIS WITH ALBUMIN  12/11/2020     US PARACENTESIS WITH ALBUMIN  12/18/2020     US PARACENTESIS WITH ALBUMIN  12/30/2020     US PARACENTESIS WITH ALBUMIN  01/07/2021     US  PARACENTESIS WITH ALBUMIN  01/18/2021     US PARACENTESIS WITH ALBUMIN  01/27/2021     US PARACENTESIS WITH ALBUMIN  02/05/2021     US PARACENTESIS WITH ALBUMIN  02/16/2021     US PARACENTESIS WITH ALBUMIN  02/26/2021     US PARACENTESIS WITH ALBUMIN  03/12/2021     US PARACENTESIS WITH ALBUMIN  03/22/2021     US PARACENTESIS WITH ALBUMIN  04/01/2021     US PARACENTESIS WITH ALBUMIN  04/12/2021     US PARACENTESIS WITH ALBUMIN  04/23/2021     US PARACENTESIS WITH ALBUMIN  05/04/2021     US PARACENTESIS WITH ALBUMIN  05/14/2021     US PARACENTESIS WITH ALBUMIN  05/24/2021     US PARACENTESIS WITH ALBUMIN  06/03/2021     US PARACENTESIS WITH ALBUMIN  06/14/2021     US PARACENTESIS WITH ALBUMIN  06/25/2021     US PARACENTESIS WITH ALBUMIN  07/05/2021     Past Surgical History:   Procedure Laterality Date     [place] [id number]     DENTAL SURGERY  2014    wisdom teeth     GI SURGERY       IR PARACENTESIS  8/27/2021     IR TRANSVEN INTRAHEPATIC PORTOSYST SHUNT  8/27/2021     US PARACENTESIS WITH ALBUMIN  04/02/2020     US PARACENTESIS WITH ALBUMIN  04/21/2020     US PARACENTESIS WITH ALBUMIN  06/04/2020     US PARACENTESIS WITH ALBUMIN  06/19/2020     US PARACENTESIS WITH ALBUMIN  07/29/2020     US PARACENTESIS WITH ALBUMIN  10/05/2020     US PARACENTESIS WITH ALBUMIN  11/03/2020     US PARACENTESIS WITH ALBUMIN  11/11/2020     US PARACENTESIS WITH ALBUMIN  11/24/2020     US PARACENTESIS WITH ALBUMIN  12/03/2020     US PARACENTESIS WITH ALBUMIN  12/11/2020     US PARACENTESIS WITH ALBUMIN  12/18/2020     US PARACENTESIS WITH ALBUMIN  12/30/2020     US PARACENTESIS WITH ALBUMIN  01/07/2021     US PARACENTESIS WITH ALBUMIN  01/18/2021     US PARACENTESIS WITH ALBUMIN  01/27/2021     US PARACENTESIS WITH ALBUMIN  02/05/2021     US PARACENTESIS WITH ALBUMIN  02/16/2021     US PARACENTESIS WITH ALBUMIN  02/26/2021     US PARACENTESIS WITH ALBUMIN  03/12/2021     US PARACENTESIS WITH ALBUMIN  03/22/2021     US  PARACENTESIS WITH ALBUMIN  04/01/2021     US PARACENTESIS WITH ALBUMIN  04/12/2021     US PARACENTESIS WITH ALBUMIN  04/23/2021     US PARACENTESIS WITH ALBUMIN  05/04/2021     US PARACENTESIS WITH ALBUMIN  05/14/2021     US PARACENTESIS WITH ALBUMIN  05/24/2021     US PARACENTESIS WITH ALBUMIN  06/03/2021     US PARACENTESIS WITH ALBUMIN  06/14/2021     US PARACENTESIS WITH ALBUMIN  06/25/2021     US PARACENTESIS WITH ALBUMIN  07/05/2021     Family History   Problem Relation Age of Onset     Coronary Artery Disease Father      Deep Vein Thrombosis (DVT) Brother      Hypertension Maternal Grandfather      Anesthesia Reaction No family hx of      Allergies   Allergen Reactions     Blood Transfusion Related (Informational Only) Other (See Comments)     Patient has a history of a clinically significant antibody against RBC antigens.  A delay in compatible RBCs may occur.      Prior to Admission medications    Medication Sig Start Date End Date Taking? Authorizing Provider   gabapentin (NEURONTIN) 100 MG capsule Take 100 mg by mouth nightly as needed  8/11/20  Yes Reported, Patient   guaiFENesin (MUCINEX) 600 MG 12 hr tablet Take 600 mg by mouth 2 times daily as needed for congestion or cough  2/15/20  Yes Reported, Patient   lactulose (CHRONULAC) 10 GM/15ML solution Take 10 g by mouth daily as needed  2/15/20  Yes Reported, Patient   Menthol-Methyl Salicylate (ICY HOT EXTRA STRENGTH) 10-30 % CREA Apply topically daily as needed    Yes Reported, Patient   midodrine (PROAMATINE) 5 MG tablet Take 5 mg by mouth 3 times daily  2/15/20  Yes Reported, Patient   Multiple Vitamins-Minerals (SUPER THERA PRERNA M) TABS Take 1 tablet by mouth every morning  12/29/19  Yes Reported, Patient   ondansetron (ZOFRAN) 8 MG tablet Take 0.5 tablets (4 mg) by mouth every 8 hours as needed for nausea 8/27/21  Yes Av Waldron MD   Psyllium 58.12 % PACK Take by mouth daily as needed    Yes Reported, Patient   rifaximin (XIFAXAN) 550 MG TABS  "tablet Take 1 tablet (550 mg) by mouth 2 times daily  Patient taking differently: Take 550 mg by mouth every morning  5/24/21  Yes Brandin Echavarria MD       Previous Transplant Hx: No    Cardiovascular Hx:       h/o Cardiac Issues: No       Exercise Tolerance: no chest pain or shortness of breath with exertion.    Potential Donor(s): No    ROS:    REVIEW OF SYSTEMS (check box if normal)  [x]                GENERAL  [x]                  PULMONARY [x]                 GENITOURINARY  [x]                 CNS                 [x]                  CARDIAC  [x]                  ENDOCRINE  [x]                 EARS,NOSE,THROAT [x]                  GASTROINTESTINAL [x]                  NEUROLOGIC    [x]                 MUSCLOSKELTAL  [x]                   HEMATOLOGY    Examination:     Vitals:  /75   Pulse 78   Ht 1.72 m (5' 7.72\")   Wt 65.3 kg (144 lb)   SpO2 98%   BMI 22.08 kg/m      GENERAL APPEARANCE: alert and no distress  GENERAL APPEARANCE: no distress  EYES: PERRL  HENT: mouth without ulcers or lesions  NECK: supple, no adenopathy  RESP: lungs clear to auscultation - no rales, rhonchi or wheezes  CV: regular rhythm, normal rate, no rub   ABDOMEN:  soft, nontender, no HSM or masses and bowel sounds normal  MS: extremities normal- no gross deformities noted, no evidence of inflammation in joints, no muscle tenderness  SKIN: no rash  NEURO: Normal strength and tone, sensory exam grossly normal, mentation intact and speech normal  PSYCH: mentation appears normal. and affect normal/bright    I had a long discussion with the patient regarding liver transplantation which included but was not limited to  the following points:    1. Liver transplant selection committee process.  2. The federal rules for cadaveric waiting list, the size and blood type matching of the organ. The availability of living-related donor transplantation.  3. The types of donors: brain death donors, non-heart beating donors, " partial liver grafts: splits and living donor grafts  4. Extended criteria  Donors (older age, steasosis) and the increased  risk of primary non-function using the extended criteria donors  5. The CDC high risk donors,  Risk of donor transmitted infections and donor transmitted malignancy  6. The liver transplant operation and the associated risks and technical complications which can include intraoperative death, post operative death,  Primary non-function, bleeding requiring re-operations, arterial and biliary complications, bowel perforations, and intra abdominal abscess. Some of these complicaitons may require a second operation.  7. The postoperative course, the ICU stay and risk of postoperative complications which can include sepsis, MI, stroke, brain injury, pneumonia, pleural effusions, and renal dysfunction.  8. The current 1 year and 5 year graft and patient survivals.  9. The need for life long immunosuppressive therapy and the side effects of these medications, including the possibility of toxicity, opportunistic infections, risk of cancer including lymphoma, and the possibility of rejection even if the patient is taking the medication exactly as prescribed.  10. The need for compliance with medications and follow-up visits in the clinic and thereafter.  11. The patient and family understand these risks and wish to proceed to transplantation         Again, thank you for allowing me to participate in the care of your patient.        Sincerely,        Carlos Enrique Rodríguez MD

## 2021-10-05 NOTE — LETTER
10/5/2021         RE: Christo Herrera  [address] [place] [address]        Dear Colleague,    Thank you for referring your patient, Christo Herrera, to the [facility] [place]. Please see a copy of my visit note below.    [facility]    Hepatology follow-up    CONSULTING HEALTHCARE PROVIDER:  Luda Caraballo MD    CHIEF COMPLAINT AND REASON FOR VISIT:  Decompensated alcoholic liver disease.    SUBJECTIVE:  Mr. Herrera is a 36-year-old male with a history of alcohol abuse disorder and he was seen by us for alcoholic hepatitis/alcoholic cirrhosis.  He decompensated and his main problem was the fluid retention with ascites, had also hepatic encephalopathy.  Mr. Herrera also had some acute kidney injury, which has improved.  He has been on dialysis in 08/2020, although he is no longer doing that.      Because of his ascites , we sent him for evaluation for TIPS and in fact, he did have the TIPS on 08/27/2021.  Since then, his fluid got better but he still requires therapeutic paracentesis.  He had an abdominal ultrasound with Dopplers today and it was read as patent TIPS with elevated velocities of 247.  The patient is supposed to meet with Dr. Pizarro anyway tomorrow.      Otherwise, he did lose a lot of muscle mass since we followed him here, but now since the TIPS he claims that he might be gaining it back.  He has no edema, has some abdominal discomfort.  He has also fatigue.  He is taking his lactulose and rifaximin and appears to be very lucid.  He is having bowel movements, like 3 a day with no blood in it, and he has done the vaccination for the COVID with Pfizer.      Medical hx Surgical hx   Past Medical History:   Diagnosis Date     Alcoholic cirrhosis of liver with ascites (H) 03/10/2020     Ascites      CKD (chronic kidney disease) stage 3, GFR 30-59 ml/min (H)      Essential (primary) hypertension 09/17/2020     GERD  (gastroesophageal reflux disease)      HE (hepatic encephalopathy) (H)      History of blood transfusion      History of ESRD requiring dialysis 12/2019    Resolved     Restless legs syndrome (RLS)       Past Surgical History:   Procedure Laterality Date     COLONOSCOPY      [facility] 2020     DENTAL SURGERY  2014    wisdom teeth     GI SURGERY       IR PARACENTESIS  8/27/2021     IR TRANSVEN INTRAHEPATIC PORTOSYST SHUNT  8/27/2021     US PARACENTESIS WITH ALBUMIN  04/02/2020     US PARACENTESIS WITH ALBUMIN  04/21/2020     US PARACENTESIS WITH ALBUMIN  06/04/2020     US PARACENTESIS WITH ALBUMIN  06/19/2020     US PARACENTESIS WITH ALBUMIN  07/29/2020     US PARACENTESIS WITH ALBUMIN  10/05/2020     US PARACENTESIS WITH ALBUMIN  11/03/2020     US PARACENTESIS WITH ALBUMIN  11/11/2020     US PARACENTESIS WITH ALBUMIN  11/24/2020     US PARACENTESIS WITH ALBUMIN  12/03/2020     US PARACENTESIS WITH ALBUMIN  12/11/2020     US PARACENTESIS WITH ALBUMIN  12/18/2020     US PARACENTESIS WITH ALBUMIN  12/30/2020     US PARACENTESIS WITH ALBUMIN  01/07/2021     US PARACENTESIS WITH ALBUMIN  01/18/2021     US PARACENTESIS WITH ALBUMIN  01/27/2021     US PARACENTESIS WITH ALBUMIN  02/05/2021     US PARACENTESIS WITH ALBUMIN  02/16/2021     US PARACENTESIS WITH ALBUMIN  02/26/2021     US PARACENTESIS WITH ALBUMIN  03/12/2021     US PARACENTESIS WITH ALBUMIN  03/22/2021     US PARACENTESIS WITH ALBUMIN  04/01/2021     US PARACENTESIS WITH ALBUMIN  04/12/2021     US PARACENTESIS WITH ALBUMIN  04/23/2021     US PARACENTESIS WITH ALBUMIN  05/04/2021     US PARACENTESIS WITH ALBUMIN  05/14/2021     US PARACENTESIS WITH ALBUMIN  05/24/2021     US PARACENTESIS WITH ALBUMIN  06/03/2021     US PARACENTESIS WITH ALBUMIN  06/14/2021     US PARACENTESIS WITH ALBUMIN  06/25/2021     US PARACENTESIS WITH ALBUMIN  07/05/2021          Medications  Prior to Admission medications    Medication Sig Start Date End Date Taking? Authorizing  Provider   gabapentin (NEURONTIN) 100 MG capsule Take 100 mg by mouth nightly as needed  8/11/20  Yes Reported, Patient   guaiFENesin (MUCINEX) 600 MG 12 hr tablet Take 600 mg by mouth 2 times daily as needed for congestion or cough  2/15/20  Yes Reported, Patient   lactulose (CHRONULAC) 10 GM/15ML solution Take 10 g by mouth daily as needed  2/15/20  Yes Reported, Patient   Menthol-Methyl Salicylate (ICY HOT EXTRA STRENGTH) 10-30 % CREA Apply topically daily as needed    Yes Reported, Patient   midodrine (PROAMATINE) 5 MG tablet Take 5 mg by mouth 3 times daily  2/15/20  Yes Reported, Patient   Multiple Vitamins-Minerals (SUPER THERA PRERNA M) TABS Take 1 tablet by mouth every morning  12/29/19  Yes Reported, Patient   ondansetron (ZOFRAN) 8 MG tablet Take 0.5 tablets (4 mg) by mouth every 8 hours as needed for nausea 8/27/21  Yes Av Waldron MD   Psyllium 58.12 % PACK Take by mouth daily as needed    Yes Reported, Patient   rifaximin (XIFAXAN) 550 MG TABS tablet Take 1 tablet (550 mg) by mouth 2 times daily  Patient taking differently: Take 550 mg by mouth every morning  5/24/21  Yes Brandin Echavarria MD       Allergies  Allergies   Allergen Reactions     Blood Transfusion Related (Informational Only) Other (See Comments)     Patient has a history of a clinically significant antibody against RBC antigens.  A delay in compatible RBCs may occur.        Review of systems  A 10-point review of systems was negative    Examination  There were no vitals taken for this visit.  There is no height or weight on file to calculate BMI.    Gen- well, NAD, A+Ox3, normal color  Lym- no palpable LAD  CVS- RRR  RS- CTA  Abd- Distended with shifting dullness.  Extr- hands normal, no LARISSA  Skin- no rash or jaundice  Neuro- no asterixis  Psych- normal mood    Laboratory  Lab Results   Component Value Date     10/05/2021     01/12/2021    POTASSIUM 4.2 10/05/2021    POTASSIUM 5.4 01/12/2021    CHLORIDE 109  10/05/2021    CHLORIDE 108 01/12/2021    CO2 26 10/05/2021    CO2 23 01/12/2021    BUN 22 10/05/2021    BUN 48 01/12/2021    CR 1.23 10/05/2021    CR 1.39 01/12/2021       Lab Results   Component Value Date    BILITOTAL 1.7 10/05/2021    BILITOTAL 1.2 01/12/2021    ALT 27 10/05/2021    ALT 32 01/12/2021    AST 43 10/05/2021    AST 43 01/12/2021    ALKPHOS 157 10/05/2021    ALKPHOS 128 01/12/2021       Lab Results   Component Value Date    ALBUMIN 2.8 10/05/2021    ALBUMIN 3.3 01/12/2021    PROTTOTAL 5.3 10/05/2021    PROTTOTAL 6.1 01/12/2021        Lab Results   Component Value Date    WBC 4.8 10/05/2021    WBC 4.9 01/12/2021    HGB 14.2 10/05/2021    HGB 13.4 01/12/2021    MCV 97 10/05/2021    MCV 95 01/12/2021     10/05/2021     01/12/2021       Lab Results   Component Value Date    INR 1.17 10/05/2021    INR 1.2 07/20/2021    INR 1.31 01/12/2021     MELD-Na score: 12 at 10/5/2021  8:29 AM  MELD score: 12 at 10/5/2021  8:29 AM  Calculated from:  Serum Creatinine: 1.23 mg/dL at 10/5/2021  8:29 AM  Serum Sodium: 142 mmol/L (Using max of 137 mmol/L) at 10/5/2021  8:29 AM  Total Bilirubin: 1.7 mg/dL at 10/5/2021  8:29 AM  INR(ratio): 1.17 at 10/5/2021  8:29 AM  Age: 36 years    Radiology    ASSESSMENT AND PLAN:  Alcoholic liver disease.  Mr. Herrera has alcoholic liver disease and his main problem was the fluid retention.  He had TIPS done as his MELD score was very low.  We thought that this could be a bridge to transplantation as the patient was interested in proceeding with living donor liver transplantation.  It was not a destination therapy.  Regardless, he has minimal hepatic encephalopathy initially and we gave him rifaximin and lactulose and he is doing quite well and is very lucid.     Living donor liver transplant candidacy: He was seen also by our transplant surgeons today and they will give us an idea of the appropriateness of him going forward with living donor liver transplantation.       Besides that, we will continue doing surveillance for HCC and he will be seen here in 3 months.  Otherwise, he is going to meet with Rowan to review his Dopplers of the TIPS done today and see if he needs revision.  His native MELD score is 12.    Brandin Echavarria MD  Hepatology  [facility]      Again, thank you for allowing me to participate in the care of your patient.        Sincerely,        Brandin Echavarria MD

## 2021-10-05 NOTE — LETTER
10/5/2021         RE: Christo Herrera  [address]  [place] [address]      [facility]    Hepatology follow-up    CONSULTING HEALTHCARE PROVIDER:  Luda Caraballo MD    CHIEF COMPLAINT AND REASON FOR VISIT:  Decompensated alcoholic liver disease.    SUBJECTIVE:  Mr. Herrera is a 36-year-old male with a history of alcohol abuse disorder and he was seen by us for alcoholic hepatitis/alcoholic cirrhosis.  He decompensated and his main problem was the fluid retention with ascites, had also hepatic encephalopathy.  Mr. Herrera also had some acute kidney injury, which has improved.  He has been on dialysis in 08/2020, although he is no longer doing that.      Because of his ascites , we sent him for evaluation for TIPS and in fact, he did have the TIPS on 08/27/2021.  Since then, his fluid got better but he still requires therapeutic paracentesis.  He had an abdominal ultrasound with Dopplers today and it was read as patent TIPS with elevated velocities of 247.  The patient is supposed to meet with Dr. Pizarro anyway tomorrow.      Otherwise, he did lose a lot of muscle mass since we followed him here, but now since the TIPS he claims that he might be gaining it back.  He has no edema, has some abdominal discomfort.  He has also fatigue.  He is taking his lactulose and rifaximin and appears to be very lucid.  He is having bowel movements, like 3 a day with no blood in it, and he has done the vaccination for the COVID with Pfizer.      Medical hx Surgical hx   Past Medical History:   Diagnosis Date     Alcoholic cirrhosis of liver with ascites (H) 03/10/2020     Ascites      CKD (chronic kidney disease) stage 3, GFR 30-59 ml/min (H)      Essential (primary) hypertension 09/17/2020     GERD (gastroesophageal reflux disease)      HE (hepatic encephalopathy) (H)      History of blood transfusion      History of ESRD requiring dialysis 12/2019    Resolved     Restless legs syndrome  (RLS)       Past Surgical History:   Procedure Laterality Date     COLONOSCOPY      [facility] 2020     DENTAL SURGERY  2014    wisdom teeth     GI SURGERY       IR PARACENTESIS  8/27/2021     IR TRANSVEN INTRAHEPATIC PORTOSYST SHUNT  8/27/2021     US PARACENTESIS WITH ALBUMIN  04/02/2020     US PARACENTESIS WITH ALBUMIN  04/21/2020     US PARACENTESIS WITH ALBUMIN  06/04/2020     US PARACENTESIS WITH ALBUMIN  06/19/2020     US PARACENTESIS WITH ALBUMIN  07/29/2020     US PARACENTESIS WITH ALBUMIN  10/05/2020     US PARACENTESIS WITH ALBUMIN  11/03/2020     US PARACENTESIS WITH ALBUMIN  11/11/2020     US PARACENTESIS WITH ALBUMIN  11/24/2020     US PARACENTESIS WITH ALBUMIN  12/03/2020     US PARACENTESIS WITH ALBUMIN  12/11/2020     US PARACENTESIS WITH ALBUMIN  12/18/2020     US PARACENTESIS WITH ALBUMIN  12/30/2020     US PARACENTESIS WITH ALBUMIN  01/07/2021     US PARACENTESIS WITH ALBUMIN  01/18/2021     US PARACENTESIS WITH ALBUMIN  01/27/2021     US PARACENTESIS WITH ALBUMIN  02/05/2021     US PARACENTESIS WITH ALBUMIN  02/16/2021     US PARACENTESIS WITH ALBUMIN  02/26/2021     US PARACENTESIS WITH ALBUMIN  03/12/2021     US PARACENTESIS WITH ALBUMIN  03/22/2021     US PARACENTESIS WITH ALBUMIN  04/01/2021     US PARACENTESIS WITH ALBUMIN  04/12/2021     US PARACENTESIS WITH ALBUMIN  04/23/2021     US PARACENTESIS WITH ALBUMIN  05/04/2021     US PARACENTESIS WITH ALBUMIN  05/14/2021     US PARACENTESIS WITH ALBUMIN  05/24/2021     US PARACENTESIS WITH ALBUMIN  06/03/2021     US PARACENTESIS WITH ALBUMIN  06/14/2021     US PARACENTESIS WITH ALBUMIN  06/25/2021     US PARACENTESIS WITH ALBUMIN  07/05/2021          Medications  Prior to Admission medications    Medication Sig Start Date End Date Taking? Authorizing Provider   gabapentin (NEURONTIN) 100 MG capsule Take 100 mg by mouth nightly as needed  8/11/20  Yes Reported, Patient   guaiFENesin (MUCINEX) 600 MG 12 hr tablet Take 600 mg by mouth 2 times  daily as needed for congestion or cough  2/15/20  Yes Reported, Patient   lactulose (CHRONULAC) 10 GM/15ML solution Take 10 g by mouth daily as needed  2/15/20  Yes Reported, Patient   Menthol-Methyl Salicylate (ICY HOT EXTRA STRENGTH) 10-30 % CREA Apply topically daily as needed    Yes Reported, Patient   midodrine (PROAMATINE) 5 MG tablet Take 5 mg by mouth 3 times daily  2/15/20  Yes Reported, Patient   Multiple Vitamins-Minerals (SUPER THERA PRERNA M) TABS Take 1 tablet by mouth every morning  12/29/19  Yes Reported, Patient   ondansetron (ZOFRAN) 8 MG tablet Take 0.5 tablets (4 mg) by mouth every 8 hours as needed for nausea 8/27/21  Yes Av Waldron MD   Psyllium 58.12 % PACK Take by mouth daily as needed    Yes Reported, Patient   rifaximin (XIFAXAN) 550 MG TABS tablet Take 1 tablet (550 mg) by mouth 2 times daily  Patient taking differently: Take 550 mg by mouth every morning  5/24/21  Yes Brandin Echavarria MD       Allergies  Allergies   Allergen Reactions     Blood Transfusion Related (Informational Only) Other (See Comments)     Patient has a history of a clinically significant antibody against RBC antigens.  A delay in compatible RBCs may occur.        Review of systems  A 10-point review of systems was negative    Examination  There were no vitals taken for this visit.  There is no height or weight on file to calculate BMI.    Gen- well, NAD, A+Ox3, normal color  Lym- no palpable LAD  CVS- RRR  RS- CTA  Abd- Distended with shifting dullness.  Extr- hands normal, no LARISSA  Skin- no rash or jaundice  Neuro- no asterixis  Psych- normal mood    Laboratory  Lab Results   Component Value Date     10/05/2021     01/12/2021    POTASSIUM 4.2 10/05/2021    POTASSIUM 5.4 01/12/2021    CHLORIDE 109 10/05/2021    CHLORIDE 108 01/12/2021    CO2 26 10/05/2021    CO2 23 01/12/2021    BUN 22 10/05/2021    BUN 48 01/12/2021    CR 1.23 10/05/2021    CR 1.39 01/12/2021       Lab Results   Component  Value Date    BILITOTAL 1.7 10/05/2021    BILITOTAL 1.2 01/12/2021    ALT 27 10/05/2021    ALT 32 01/12/2021    AST 43 10/05/2021    AST 43 01/12/2021    ALKPHOS 157 10/05/2021    ALKPHOS 128 01/12/2021       Lab Results   Component Value Date    ALBUMIN 2.8 10/05/2021    ALBUMIN 3.3 01/12/2021    PROTTOTAL 5.3 10/05/2021    PROTTOTAL 6.1 01/12/2021        Lab Results   Component Value Date    WBC 4.8 10/05/2021    WBC 4.9 01/12/2021    HGB 14.2 10/05/2021    HGB 13.4 01/12/2021    MCV 97 10/05/2021    MCV 95 01/12/2021     10/05/2021     01/12/2021       Lab Results   Component Value Date    INR 1.17 10/05/2021    INR 1.2 07/20/2021    INR 1.31 01/12/2021     MELD-Na score: 12 at 10/5/2021  8:29 AM  MELD score: 12 at 10/5/2021  8:29 AM  Calculated from:  Serum Creatinine: 1.23 mg/dL at 10/5/2021  8:29 AM  Serum Sodium: 142 mmol/L (Using max of 137 mmol/L) at 10/5/2021  8:29 AM  Total Bilirubin: 1.7 mg/dL at 10/5/2021  8:29 AM  INR(ratio): 1.17 at 10/5/2021  8:29 AM  Age: 36 years    Radiology    ASSESSMENT AND PLAN:  Alcoholic liver disease.  Mr. Herrera has alcoholic liver disease and his main problem was the fluid retention.  He had TIPS done as his MELD score was very low.  We thought that this could be a bridge to transplantation as the patient was interested in proceeding with living donor liver transplantation.  It was not a destination therapy.  Regardless, he has minimal hepatic encephalopathy initially and we gave him rifaximin and lactulose and he is doing quite well and is very lucid.     Living donor liver transplant candidacy: He was seen also by our transplant surgeons today and they will give us an idea of the appropriateness of him going forward with living donor liver transplantation.      Besides that, we will continue doing surveillance for HCC and he will be seen here in 3 months.  Otherwise, he is going to meet with Rowan to review his Dopplers of the TIPS done today and see if  he needs revision.  His native MELD score is 12.    Brandin Echavarria MD  Hepatology  [facility]        Brandin Echavarria MD

## 2021-10-05 NOTE — NURSING NOTE
Chief Complaint   Patient presents with     RECHECK     alcohol cirrhosis     Vitals taken at surgery visit this morning, see flowsheet.    Anna Gifford, OLIVE  10/5/2021 9:48 AM

## 2021-10-06 ENCOUNTER — VIRTUAL VISIT (OUTPATIENT)
Dept: DERMATOLOGY | Facility: CLINIC | Age: 36
End: 2021-10-06
Attending: RADIOLOGY
Payer: COMMERCIAL

## 2021-10-06 DIAGNOSIS — K70.31 ALCOHOLIC CIRRHOSIS OF LIVER WITH ASCITES (H): Primary | ICD-10-CM

## 2021-10-06 PROCEDURE — 99214 OFFICE O/P EST MOD 30 MIN: CPT | Mod: 95 | Performed by: RADIOLOGY

## 2021-10-06 NOTE — LETTER
10/6/2021      RE: Christo Herrera  [address] [place] [address]       INTERVENTIONAL RADIOLOGY CONSULTATION    Name: Christo Herrera  Age: 36 year old   Referring Physician: Dr. Echavarria   REASON FOR REFERRAL:      HPI: No issues after TIPS procedure. No jaundice, significant abdominal pain, GI bleeding, confusion. He takes rifaximin and lactulose. His creatinine has slightly improved. He has continued ascites unfortunately, with removal of approximately 8 liters every 7 days. He continues furosemide 10 mg every other day. No other alteration in medications since creatinine has improved.     His BP has significantly improved since TIPS. His pruitis has also improved.     Approved for LRD transplant per committee note on 10/5/2021.    PAST MEDICAL HISTORY:   Past Medical History:   Diagnosis Date     Alcoholic cirrhosis of liver with ascites (H) 03/10/2020     Ascites      CKD (chronic kidney disease) stage 3, GFR 30-59 ml/min (H)      Essential (primary) hypertension 09/17/2020     GERD (gastroesophageal reflux disease)      HE (hepatic encephalopathy) (H)      History of blood transfusion      History of ESRD requiring dialysis 12/2019    Resolved     Restless legs syndrome (RLS)          MEDICATIONS:   Prescription Medications as of 10/6/2021       Rx Number Disp Refills Start End Last Dispensed Date Next Fill Date Owning Pharmacy    gabapentin (NEURONTIN) 100 MG capsule    8/11/2020    [facility] [address] [place] [address]    Sig: Take 100 mg by mouth nightly as needed     Class: Historical    Route: Oral    guaiFENesin (MUCINEX) 600 MG 12 hr tablet    2/15/2020        Sig: Take 600 mg by mouth 2 times daily as needed for congestion or cough     Class: Historical    Route: Oral    lactulose (CHRONULAC) 10 GM/15ML solution    2/15/2020        Sig: Take 10 g by mouth daily as needed     Class: Historical    Route: Oral    Menthol-Methyl  Salicylate (ICY HOT EXTRA STRENGTH) 10-30 % CREA            Sig: Apply topically daily as needed     Class: Historical    Route: Topical    midodrine (PROAMATINE) 5 MG tablet    2/15/2020        Sig: Take 5 mg by mouth 3 times daily     Class: Historical    Route: Oral    Multiple Vitamins-Minerals (SUPER THERA PRERNA M) TABS    12/29/2019        Sig: Take 1 tablet by mouth every morning     Class: Historical    Route: Oral    ondansetron (ZOFRAN) 8 MG tablet  15 tablet 0 8/27/2021    [facility] [address] [place] [address]    Sig: Take 0.5 tablets (4 mg) by mouth every 8 hours as needed for nausea    Class: E-Prescribe    Route: Oral    Psyllium 58.12 % PACK            Sig: Take by mouth daily as needed     Class: Historical    Route: Oral    rifaximin (XIFAXAN) 550 MG TABS tablet  60 tablet 11 5/24/2021    [facility] [place] [address]    Sig: Take 1 tablet (550 mg) by mouth 2 times daily    Class: E-Prescribe    Route: Oral          ALLERGIES:   Blood transfusion related (informational only)    ROS:  As above    Physical Examination:   VITALS:   There were no vitals taken for this visit.  GENERAL: Alert and no distress  SKIN: Visible skin clear. No significant rash, abnormal pigmentation or lesions.  PSYCH: Mentation appears normal, affect normal/bright, judgement and insight intact, normal speech and appearance well-groomed.  EYES: Eyes grossly normal to inspection. No discharge or erythema, or obvious scleral/conjunctival abnormalities.  RESP: No audible wheeze, cough, or visible cyanosis. No visible retractions or increased work of breathing.   NEURO: Cranial nerves grossly intact. Mentation and speech appropriate for age.       Labs:    BMP RESULTS:  Lab Results   Component Value Date     10/05/2021     01/12/2021    POTASSIUM 4.2 10/05/2021    POTASSIUM 5.4 (H) 01/12/2021     CHLORIDE 109 10/05/2021    CHLORIDE 108 01/12/2021    CO2 26 10/05/2021    CO2 23 01/12/2021    ANIONGAP 7 10/05/2021    ANIONGAP 6 01/12/2021    GLC 88 10/05/2021    GLC 93 01/12/2021    BUN 22 10/05/2021    BUN 48 (H) 01/12/2021    CR 1.23 10/05/2021    CR 1.39 (H) 01/12/2021    GFRESTIMATED 76 10/05/2021    GFRESTIMATED 65 01/12/2021    GFRESTBLACK 76 01/12/2021    JILLIAN 8.7 10/05/2021    JILLIAN 8.8 01/12/2021        CBC RESULTS:  Lab Results   Component Value Date    WBC 4.8 10/05/2021    WBC 4.9 01/12/2021    RBC 4.55 10/05/2021    RBC 4.41 01/12/2021    HGB 14.2 10/05/2021    HGB 13.4 01/12/2021    HCT 44.0 10/05/2021    HCT 41.7 01/12/2021    MCV 97 10/05/2021    MCV 95 01/12/2021    MCH 31.2 10/05/2021    MCH 30.4 01/12/2021    MCHC 32.3 10/05/2021    MCHC 32.1 01/12/2021    RDW 14.5 10/05/2021    RDW 14.6 01/12/2021     (L) 10/05/2021     (L) 01/12/2021       INR/PTT:  Lab Results   Component Value Date    INR 1.17 (H) 10/05/2021    INR 1.2 07/20/2021    INR 1.31 (H) 01/12/2021    PTT 32 08/27/2021       Diagnostic studies:   Imaging reviewed by me.  TIPS ultrasound on 10/5/2021 demonstrates patent TIPS, with velocity of 247 cm/s mid TIPS.  There is retrograde flow in the right and left portal veins.  Large volume ascites is noted.    Radiologist impression:  IMPRESSION:  1. TIPS patent with elevated velocities to 247 cm/s.     2. Right and left portal veins are retrograde in flow. Main portal and  splenic veins remain antegrade in flow.     3. Large volume ascites in all 4 quadrants.      Assessment: 36-year-old male with alcohol induced cirrhosis (Na MELD 17, Child Jo B, ECOG 0) and evidence of portal hypertension, with history of varices and remote GI bleeding, as well as large volume refractory ascites that is lifestyle limiting. He is now 6 weeks post TIPS placement, and although ascites mildly improved, he has persistent large volume ascites despite the TIPS placement.  Ultrasound today  reveals patent TIPS and retrograde flow in the portal right and left portal vein, but large volume ascites.  He continues to abstain from alcohol.  He has been assessed for possible liver transplant her at the  (see committee note dated 10/5/2021).    Plan:  1. Noncontrast CT to assess position of hepatic venous end of TIPS prior to TIPS revision.  2. TIPS check and revision after CT.  3. Consider diet and medication management of ascites now that creatinine is improved. This was discussed with Dr. Edwards.    Was a pleasure to conduct this video visit with Mr. Herrera today.  Thank you for involving the interventional radiology service in his care.    I spent a total of 14 minutes on today's video visit, over 50% time was for counseling and care coordination.  In addition I spent 10 minutes reviewing imaging and 10 minutes completing documentation.    Malu Pizarro MD  Interventional Radiology   Pager [phone]     Review of the result(s) of each unique test - US    Video-Visit Details     Type of service:  Video Visit    Video Start Time: 03:02 pm     Video End Time:3:16 PM    Originating Location (pt. Location): Home     Distant Location (provider location):  [facility] [place]      Platform used for Video Visit: [employer]       Patient Care Team:  Annie Lara MD as PCP - Christo Livingston Jr., RN as Nurse Coordinator (Transplant)  Luda Caraballo MD as Referring Physician (Gastroenterology)  Aide Martínez PA-C as Assigned Heart and Vascular Provider  YARA EDWARDS

## 2021-10-06 NOTE — PROGRESS NOTES
INTERVENTIONAL RADIOLOGY CONSULTATION    Name: Christo Herrera  Age: 36 year old   Referring Physician: Dr. Echavarria   REASON FOR REFERRAL:      HPI: No issues after TIPS procedure. No jaundice, significant abdominal pain, GI bleeding, confusion. He takes rifaximin and lactulose. His creatinine has slightly improved. He has continued ascites unfortunately, with removal of approximately 8 liters every 7 days. He continues furosemide 10 mg every other day. No other alteration in medications since creatinine has improved.     His BP has significantly improved since TIPS. His pruitis has also improved.     Approved for LRD transplant per committee note on 10/5/2021.    PAST MEDICAL HISTORY:   Past Medical History:   Diagnosis Date     Alcoholic cirrhosis of liver with ascites (H) 03/10/2020     Ascites      CKD (chronic kidney disease) stage 3, GFR 30-59 ml/min (H)      Essential (primary) hypertension 09/17/2020     GERD (gastroesophageal reflux disease)      HE (hepatic encephalopathy) (H)      History of blood transfusion      History of ESRD requiring dialysis 12/2019    Resolved     Restless legs syndrome (RLS)          MEDICATIONS:   Prescription Medications as of 10/6/2021       Rx Number Disp Refills Start End Last Dispensed Date Next Fill Date Owning Pharmacy    gabapentin (NEURONTIN) 100 MG capsule    8/11/2020    [facility] #[id number]    Sig: Take 100 mg by mouth nightly as needed     Class: Historical    Route: Oral    guaiFENesin (MUCINEX) 600 MG 12 hr tablet    2/15/2020        Sig: Take 600 mg by mouth 2 times daily as needed for congestion or cough     Class: Historical    Route: Oral    lactulose (CHRONULAC) 10 GM/15ML solution    2/15/2020        Sig: Take 10 g by mouth daily as needed     Class: Historical    Route: Oral    Menthol-Methyl Salicylate (ICY HOT EXTRA STRENGTH) 10-30 % CREA            Sig: Apply topically  daily as needed     Class: Historical    Route: Topical    midodrine (PROAMATINE) 5 MG tablet    2/15/2020        Sig: Take 5 mg by mouth 3 times daily     Class: Historical    Route: Oral    Multiple Vitamins-Minerals (SUPER THERA PRERNA M) TABS    12/29/2019        Sig: Take 1 tablet by mouth every morning     Class: Historical    Route: Oral    ondansetron (ZOFRAN) 8 MG tablet  15 tablet 0 8/27/2021    [facility] [address] [place] [address]    Sig: Take 0.5 tablets (4 mg) by mouth every 8 hours as needed for nausea    Class: E-Prescribe    Route: Oral    Psyllium 58.12 % PACK            Sig: Take by mouth daily as needed     Class: Historical    Route: Oral    rifaximin (XIFAXAN) 550 MG TABS tablet  60 tablet 11 5/24/2021    [facility] [address] [place] [address]    Sig: Take 1 tablet (550 mg) by mouth 2 times daily    Class: E-Prescribe    Route: Oral          ALLERGIES:   Blood transfusion related (informational only)    ROS:  As above    Physical Examination:   VITALS:   There were no vitals taken for this visit.  GENERAL: Alert and no distress  SKIN: Visible skin clear. No significant rash, abnormal pigmentation or lesions.  PSYCH: Mentation appears normal, affect normal/bright, judgement and insight intact, normal speech and appearance well-groomed.  EYES: Eyes grossly normal to inspection. No discharge or erythema, or obvious scleral/conjunctival abnormalities.  RESP: No audible wheeze, cough, or visible cyanosis. No visible retractions or increased work of breathing.   NEURO: Cranial nerves grossly intact. Mentation and speech appropriate for age.       Labs:    BMP RESULTS:  Lab Results   Component Value Date     10/05/2021     01/12/2021    POTASSIUM 4.2 10/05/2021    POTASSIUM 5.4 (H) 01/12/2021    CHLORIDE 109 10/05/2021    CHLORIDE 108 01/12/2021    CO2 26 10/05/2021    CO2  23 01/12/2021    ANIONGAP 7 10/05/2021    ANIONGAP 6 01/12/2021    GLC 88 10/05/2021    GLC 93 01/12/2021    BUN 22 10/05/2021    BUN 48 (H) 01/12/2021    CR 1.23 10/05/2021    CR 1.39 (H) 01/12/2021    GFRESTIMATED 76 10/05/2021    GFRESTIMATED 65 01/12/2021    GFRESTBLACK 76 01/12/2021    JILLIAN 8.7 10/05/2021    JILLIAN 8.8 01/12/2021        CBC RESULTS:  Lab Results   Component Value Date    WBC 4.8 10/05/2021    WBC 4.9 01/12/2021    RBC 4.55 10/05/2021    RBC 4.41 01/12/2021    HGB 14.2 10/05/2021    HGB 13.4 01/12/2021    HCT 44.0 10/05/2021    HCT 41.7 01/12/2021    MCV 97 10/05/2021    MCV 95 01/12/2021    MCH 31.2 10/05/2021    MCH 30.4 01/12/2021    MCHC 32.3 10/05/2021    MCHC 32.1 01/12/2021    RDW 14.5 10/05/2021    RDW 14.6 01/12/2021     (L) 10/05/2021     (L) 01/12/2021       INR/PTT:  Lab Results   Component Value Date    INR 1.17 (H) 10/05/2021    INR 1.2 07/20/2021    INR 1.31 (H) 01/12/2021    PTT 32 08/27/2021       Diagnostic studies:   Imaging reviewed by me.  TIPS ultrasound on 10/5/2021 demonstrates patent TIPS, with velocity of 247 cm/s mid TIPS.  There is retrograde flow in the right and left portal veins.  Large volume ascites is noted.    Radiologist impression:  IMPRESSION:  1. TIPS patent with elevated velocities to 247 cm/s.     2. Right and left portal veins are retrograde in flow. Main portal and  splenic veins remain antegrade in flow.     3. Large volume ascites in all 4 quadrants.      Assessment: 36-year-old male with alcohol induced cirrhosis (Na MELD 17, Child Jo B, ECOG 0) and evidence of portal hypertension, with history of varices and remote GI bleeding, as well as large volume refractory ascites that is lifestyle limiting. He is now 6 weeks post TIPS placement, and although ascites mildly improved, he has persistent large volume ascites despite the TIPS placement.  Ultrasound today reveals patent TIPS and retrograde flow in the portal right and left portal vein,  but large volume ascites.  He continues to abstain from alcohol.  He has been assessed for possible liver transplant her at the  (see committee note dated 10/5/2021).    Plan:  1. Noncontrast CT to assess position of hepatic venous end of TIPS prior to TIPS revision.  2. TIPS check and revision after CT.  3. Consider diet and medication management of ascites now that creatinine is improved. This was discussed with Dr. Edwards.    Was a pleasure to conduct this video visit with Mr. Herrera today.  Thank you for involving the interventional radiology service in his care.    I spent a total of 14 minutes on today's video visit, over 50% time was for counseling and care coordination.  In addition I spent 10 minutes reviewing imaging and 10 minutes completing documentation.    Malu Pizarro MD  Interventional Radiology   Pager [phone]     Review of the result(s) of each unique test - US    Video-Visit Details     Type of service:  Video Visit    Video Start Time: 03:02 pm     Video End Time:3:16 PM    Originating Location (pt. Location): Home     Distant Location (provider location):  [facility] VASCULAR [facility]      Platform used for Video Visit: [employer]       Patient Care Team:  Annie Lara MD as PCP - Christo Livingston Jr., RN as Nurse Coordinator (Transplant)  Luda Caraballo MD as Referring Physician (Gastroenterology)  Brandin Edwards MD as Assigned Gastroenterology Provider  Aide Martínez PA-C as Physician Assistant (Pediatric Critical Care Medicine)  Aide Martínez PA-C as Assigned Heart and Vascular Provider  BRANDIN EDWARDS

## 2021-10-07 ENCOUNTER — HOSPITAL ENCOUNTER (OUTPATIENT)
Dept: ULTRASOUND IMAGING | Facility: CLINIC | Age: 36
Discharge: HOME | End: 2021-10-07
Attending: INTERNAL MEDICINE | Admitting: INTERNAL MEDICINE
Payer: COMMERCIAL

## 2021-10-07 DIAGNOSIS — R18.8 OTHER ASCITES: ICD-10-CM

## 2021-10-07 PROCEDURE — 49083 ABD PARACENTESIS W/IMAGING: CPT

## 2021-10-07 PROCEDURE — 250N000011 HC RX IP 250 OP 636: Performed by: INTERNAL MEDICINE

## 2021-10-07 PROCEDURE — P9047 ALBUMIN (HUMAN), 25%, 50ML: HCPCS | Performed by: INTERNAL MEDICINE

## 2021-10-07 RX ORDER — ALBUMIN (HUMAN) 12.5 G/50ML
25-75 SOLUTION INTRAVENOUS ONCE
Status: COMPLETED | OUTPATIENT
Start: 2021-10-07 | End: 2021-10-07

## 2021-10-07 RX ADMIN — ALBUMIN HUMAN 50 G: 0.25 SOLUTION INTRAVENOUS at 10:23

## 2021-10-07 NOTE — IR NOTE
Patient tolerated paracentesis with albumin with no concerns expressed or observed.    7.7 L removed with 50 grams albumin given per order.

## 2021-10-10 ENCOUNTER — HEALTH MAINTENANCE LETTER (OUTPATIENT)
Age: 36
End: 2021-10-10

## 2021-10-12 LAB — PETH BLD-MCNC: NEGATIVE NG/ML

## 2021-10-13 NOTE — PROGRESS NOTES
Assessment and Plan:  1. liver transplant evaluation - patient is a good candidate overall. Benefits and surgical risks of a liver transplantation were discussed.  2.  End stage liver disease due to Laennec's    Surgical evaluation:  1. Portal Vein:Patent  2. Hepatic Artery: Open  3. TIPS: present  4. Previous Abdominal Surgery: No  5. Hepatocellular Carcinoma: None  6. Ascites: Present - large amount  7. Costal Angle: narrow  8. Portopulmonary Hypertension: absent  9. Hepatopulmonary Syndrome: absent  10. Cardiac Evaluation: needs stress echocardiogram  11. Nutritional Status: Moderate  12. Diabetes: no  13.Hypertension no  14. Smoker:rarely in the past, none currently  14: Fraility index:not frail  15. Meets guidelines to receive Living Donor  Yes -     16. Potential Living donors Yes -  looking within [facility]    Recommendations: overall a good candidate for liver transplant, MELD 17 but lower now.  Would be a good candidate for living donor liver transplant or DCD      Patients overall evaluation will be discussed at the Liver Transplant selection committee meeting with a final recommendation on the patients suitability for transplant to be made at that time.    Consult Full  Details:  Christo Herrera was seen in consultation at the request of Dr. Echavarria for evaluation as a potential liver transplant recipient.    Reason for Visit:  Christo Herrera is a 36 year old year old male with Laennec's, who presents for liver transplant evaluation.    HPI:  Presenting complaint: Abdominal distention    35 y/o EtOH cirrhosis, prior history of heavy drinking leading to alcoholic hepatitis/decompensation 2019 2-3 hosp stay resulting in multiple admissions to the hospital over the next year.  Stopped drinking 11/2019, brief inpatient rehab  8/2020- [facility], transiently on HD, more recently has developed refractory ascites,  Requiring paracentesis Q7-10 days of ~8L each time. 8/27- TIPS but still has ascites, may need  revision     PMH  Cirrhosis- EtOH, refractory ascites    PSH  No abdominal surg    Soc  Lives in [facility] with mother  Worked at [employer] previously now not working  EtOH- as above, quit 11/2019, 1 prior DUI  Tob- rarely in past, none currently  No drugs    Fam  Mother- alive relatively healthy  Father- MI in 50's, alive  3 Bro's - all healthy  No liver disease, liver cancer    Past Medical History:   Diagnosis Date     Alcoholic cirrhosis of liver with ascites (H) 03/10/2020     Ascites      CKD (chronic kidney disease) stage 3, GFR 30-59 ml/min (H)      Essential (primary) hypertension 09/17/2020     GERD (gastroesophageal reflux disease)      HE (hepatic encephalopathy) (H)      History of blood transfusion      History of ESRD requiring dialysis 12/2019    Resolved     Restless legs syndrome (RLS)      Past Surgical History:   Procedure Laterality Date     COLONOSCOPY      [facility] 2020     DENTAL SURGERY  2014    wisdom teeth     GI SURGERY       IR PARACENTESIS  8/27/2021     IR TRANSVEN INTRAHEPATIC PORTOSYST SHUNT  8/27/2021     US PARACENTESIS WITH ALBUMIN  04/02/2020     US PARACENTESIS WITH ALBUMIN  04/21/2020     US PARACENTESIS WITH ALBUMIN  06/04/2020     US PARACENTESIS WITH ALBUMIN  06/19/2020     US PARACENTESIS WITH ALBUMIN  07/29/2020     US PARACENTESIS WITH ALBUMIN  10/05/2020     US PARACENTESIS WITH ALBUMIN  11/03/2020     US PARACENTESIS WITH ALBUMIN  11/11/2020     US PARACENTESIS WITH ALBUMIN  11/24/2020     US PARACENTESIS WITH ALBUMIN  12/03/2020     US PARACENTESIS WITH ALBUMIN  12/11/2020     US PARACENTESIS WITH ALBUMIN  12/18/2020     US PARACENTESIS WITH ALBUMIN  12/30/2020     US PARACENTESIS WITH ALBUMIN  01/07/2021     US PARACENTESIS WITH ALBUMIN  01/18/2021     US PARACENTESIS WITH ALBUMIN  01/27/2021     US PARACENTESIS WITH ALBUMIN  02/05/2021     US PARACENTESIS WITH ALBUMIN  02/16/2021     US PARACENTESIS WITH ALBUMIN  02/26/2021     US PARACENTESIS WITH ALBUMIN  03/12/2021      US PARACENTESIS WITH ALBUMIN  03/22/2021     US PARACENTESIS WITH ALBUMIN  04/01/2021     US PARACENTESIS WITH ALBUMIN  04/12/2021     US PARACENTESIS WITH ALBUMIN  04/23/2021     US PARACENTESIS WITH ALBUMIN  05/04/2021     US PARACENTESIS WITH ALBUMIN  05/14/2021     US PARACENTESIS WITH ALBUMIN  05/24/2021     US PARACENTESIS WITH ALBUMIN  06/03/2021     US PARACENTESIS WITH ALBUMIN  06/14/2021     US PARACENTESIS WITH ALBUMIN  06/25/2021     US PARACENTESIS WITH ALBUMIN  07/05/2021     Past Surgical History:   Procedure Laterality Date     [place] [id number]     DENTAL SURGERY  2014    wisdom teeth     GI SURGERY       IR PARACENTESIS  8/27/2021     IR TRANSVEN INTRAHEPATIC PORTOSYST SHUNT  8/27/2021     US PARACENTESIS WITH ALBUMIN  04/02/2020     US PARACENTESIS WITH ALBUMIN  04/21/2020     US PARACENTESIS WITH ALBUMIN  06/04/2020     US PARACENTESIS WITH ALBUMIN  06/19/2020     US PARACENTESIS WITH ALBUMIN  07/29/2020     US PARACENTESIS WITH ALBUMIN  10/05/2020     US PARACENTESIS WITH ALBUMIN  11/03/2020     US PARACENTESIS WITH ALBUMIN  11/11/2020     US PARACENTESIS WITH ALBUMIN  11/24/2020     US PARACENTESIS WITH ALBUMIN  12/03/2020     US PARACENTESIS WITH ALBUMIN  12/11/2020     US PARACENTESIS WITH ALBUMIN  12/18/2020     US PARACENTESIS WITH ALBUMIN  12/30/2020     US PARACENTESIS WITH ALBUMIN  01/07/2021     US PARACENTESIS WITH ALBUMIN  01/18/2021     US PARACENTESIS WITH ALBUMIN  01/27/2021     US PARACENTESIS WITH ALBUMIN  02/05/2021     US PARACENTESIS WITH ALBUMIN  02/16/2021     US PARACENTESIS WITH ALBUMIN  02/26/2021     US PARACENTESIS WITH ALBUMIN  03/12/2021     US PARACENTESIS WITH ALBUMIN  03/22/2021     US PARACENTESIS WITH ALBUMIN  04/01/2021     US PARACENTESIS WITH ALBUMIN  04/12/2021     US PARACENTESIS WITH ALBUMIN  04/23/2021     US PARACENTESIS WITH ALBUMIN  05/04/2021     US PARACENTESIS WITH ALBUMIN  05/14/2021     US PARACENTESIS WITH ALBUMIN  05/24/2021       PARACENTESIS WITH ALBUMIN  06/03/2021      PARACENTESIS WITH ALBUMIN  06/14/2021      PARACENTESIS WITH ALBUMIN  06/25/2021      PARACENTESIS WITH ALBUMIN  07/05/2021     Family History   Problem Relation Age of Onset     Coronary Artery Disease Father      Deep Vein Thrombosis (DVT) Brother      Hypertension Maternal Grandfather      Anesthesia Reaction No family hx of      Allergies   Allergen Reactions     Blood Transfusion Related (Informational Only) Other (See Comments)     Patient has a history of a clinically significant antibody against RBC antigens.  A delay in compatible RBCs may occur.      Prior to Admission medications    Medication Sig Start Date End Date Taking? Authorizing Provider   gabapentin (NEURONTIN) 100 MG capsule Take 100 mg by mouth nightly as needed  8/11/20  Yes Reported, Patient   guaiFENesin (MUCINEX) 600 MG 12 hr tablet Take 600 mg by mouth 2 times daily as needed for congestion or cough  2/15/20  Yes Reported, Patient   lactulose (CHRONULAC) 10 GM/15ML solution Take 10 g by mouth daily as needed  2/15/20  Yes Reported, Patient   Menthol-Methyl Salicylate (ICY HOT EXTRA STRENGTH) 10-30 % CREA Apply topically daily as needed    Yes Reported, Patient   midodrine (PROAMATINE) 5 MG tablet Take 5 mg by mouth 3 times daily  2/15/20  Yes Reported, Patient   Multiple Vitamins-Minerals (SUPER THERA PRERNA M) TABS Take 1 tablet by mouth every morning  12/29/19  Yes Reported, Patient   ondansetron (ZOFRAN) 8 MG tablet Take 0.5 tablets (4 mg) by mouth every 8 hours as needed for nausea 8/27/21  Yes Av Waldron MD   Psyllium 58.12 % PACK Take by mouth daily as needed    Yes Reported, Patient   rifaximin (XIFAXAN) 550 MG TABS tablet Take 1 tablet (550 mg) by mouth 2 times daily  Patient taking differently: Take 550 mg by mouth every morning  5/24/21  Yes Brandin Echavarria MD       Previous Transplant Hx: No    Cardiovascular Hx:       h/o Cardiac Issues: No       Exercise  "Tolerance: no chest pain or shortness of breath with exertion.    Potential Donor(s): No    ROS:    REVIEW OF SYSTEMS (check box if normal)  [x]                GENERAL  [x]                  PULMONARY [x]                 GENITOURINARY  [x]                 CNS                 [x]                  CARDIAC  [x]                  ENDOCRINE  [x]                 EARS,NOSE,THROAT [x]                  GASTROINTESTINAL [x]                  NEUROLOGIC    [x]                 MUSCLOSKELTAL  [x]                   HEMATOLOGY    Examination:     Vitals:  /75   Pulse 78   Ht 1.72 m (5' 7.72\")   Wt 65.3 kg (144 lb)   SpO2 98%   BMI 22.08 kg/m      GENERAL APPEARANCE: alert and no distress  GENERAL APPEARANCE: no distress  EYES: PERRL  HENT: mouth without ulcers or lesions  NECK: supple, no adenopathy  RESP: lungs clear to auscultation - no rales, rhonchi or wheezes  CV: regular rhythm, normal rate, no rub   ABDOMEN:  soft, nontender, no HSM or masses and bowel sounds normal  MS: extremities normal- no gross deformities noted, no evidence of inflammation in joints, no muscle tenderness  SKIN: no rash  NEURO: Normal strength and tone, sensory exam grossly normal, mentation intact and speech normal  PSYCH: mentation appears normal. and affect normal/bright    I had a long discussion with the patient regarding liver transplantation which included but was not limited to  the following points:    1. Liver transplant selection committee process.  2. The federal rules for cadaveric waiting list, the size and blood type matching of the organ. The availability of living-related donor transplantation.  3. The types of donors: brain death donors, non-heart beating donors, partial liver grafts: splits and living donor grafts  4. Extended criteria  Donors (older age, steasosis) and the increased  risk of primary non-function using the extended criteria donors  5. The CDC high risk donors,  Risk of donor transmitted infections and donor " transmitted malignancy  6. The liver transplant operation and the associated risks and technical complications which can include intraoperative death, post operative death,  Primary non-function, bleeding requiring re-operations, arterial and biliary complications, bowel perforations, and intra abdominal abscess. Some of these complicaitons may require a second operation.  7. The postoperative course, the ICU stay and risk of postoperative complications which can include sepsis, MI, stroke, brain injury, pneumonia, pleural effusions, and renal dysfunction.  8. The current 1 year and 5 year graft and patient survivals.  9. The need for life long immunosuppressive therapy and the side effects of these medications, including the possibility of toxicity, opportunistic infections, risk of cancer including lymphoma, and the possibility of rejection even if the patient is taking the medication exactly as prescribed.  10. The need for compliance with medications and follow-up visits in the clinic and thereafter.  11. The patient and family understand these risks and wish to proceed to transplantation

## 2021-10-15 ENCOUNTER — HOSPITAL ENCOUNTER (OUTPATIENT)
Dept: ULTRASOUND IMAGING | Facility: CLINIC | Age: 36
Discharge: HOME | End: 2021-10-15
Attending: INTERNAL MEDICINE | Admitting: INTERNAL MEDICINE
Payer: COMMERCIAL

## 2021-10-15 DIAGNOSIS — R18.8 OTHER ASCITES: ICD-10-CM

## 2021-10-15 PROCEDURE — 250N000011 HC RX IP 250 OP 636: Performed by: INTERNAL MEDICINE

## 2021-10-15 PROCEDURE — 49083 ABD PARACENTESIS W/IMAGING: CPT

## 2021-10-15 PROCEDURE — P9047 ALBUMIN (HUMAN), 25%, 50ML: HCPCS | Performed by: INTERNAL MEDICINE

## 2021-10-15 RX ORDER — ALBUMIN (HUMAN) 12.5 G/50ML
25-75 SOLUTION INTRAVENOUS ONCE
Status: COMPLETED | OUTPATIENT
Start: 2021-10-15 | End: 2021-10-15

## 2021-10-15 RX ADMIN — ALBUMIN HUMAN 50 G: 0.25 SOLUTION INTRAVENOUS at 11:35

## 2021-10-15 NOTE — IR NOTE
Patient tolerated paracentesis with albumin with no concerns expressed or observed.    7.2 L removed with 50 grams albumin given per order.

## 2021-10-20 ENCOUNTER — TELEPHONE (OUTPATIENT)
Dept: GASTROENTEROLOGY | Facility: CLINIC | Age: 36
End: 2021-10-20

## 2021-10-20 DIAGNOSIS — K70.31 ALCOHOLIC CIRRHOSIS OF LIVER WITH ASCITES (H): Primary | ICD-10-CM

## 2021-10-20 RX ORDER — FUROSEMIDE 20 MG
10 TABLET ORAL DAILY
Qty: 45 TABLET | Refills: 3 | Status: SHIPPED | OUTPATIENT
Start: 2021-10-20 | End: 2022-11-17

## 2021-10-20 RX ORDER — SPIRONOLACTONE 25 MG/1
25 TABLET ORAL DAILY
Qty: 90 TABLET | Refills: 3 | Status: SHIPPED | OUTPATIENT
Start: 2021-10-20 | End: 2022-11-17

## 2021-10-20 NOTE — TELEPHONE ENCOUNTER
Called patient to let him know Dr. Echavarria added spironolactone 25 mg and furosemide 10 mg daily to help improve his ascites after TIPS.  Patient states he was taking torsemide 10 mg prescribed by his nephrologist.  Let him know to discontinue the torsemide and take the lasix and spironolactone. We can increase doses as needed.  Patient verbalized understanding.    Mariluz NELSON LPN  [facility]atology Clinic

## 2021-10-21 ENCOUNTER — LAB (OUTPATIENT)
Dept: LAB | Facility: CLINIC | Age: 36
End: 2021-10-21
Payer: COMMERCIAL

## 2021-10-21 DIAGNOSIS — Z11.59 ENCOUNTER FOR SCREENING FOR OTHER VIRAL DISEASES: ICD-10-CM

## 2021-10-21 PROCEDURE — U0005 INFEC AGEN DETEC AMPLI PROBE: HCPCS

## 2021-10-21 PROCEDURE — U0003 INFECTIOUS AGENT DETECTION BY NUCLEIC ACID (DNA OR RNA); SEVERE ACUTE RESPIRATORY SYNDROME CORONAVIRUS 2 (SARS-COV-2) (CORONAVIRUS DISEASE [COVID-19]), AMPLIFIED PROBE TECHNIQUE, MAKING USE OF HIGH THROUGHPUT TECHNOLOGIES AS DESCRIBED BY CMS-2020-01-R: HCPCS

## 2021-10-22 DIAGNOSIS — Z11.59 ENCOUNTER FOR SCREENING FOR OTHER VIRAL DISEASES: ICD-10-CM

## 2021-10-22 LAB — SARS-COV-2 RNA RESP QL NAA+PROBE: NEGATIVE

## 2021-10-25 ENCOUNTER — HOSPITAL ENCOUNTER (OUTPATIENT)
Dept: ULTRASOUND IMAGING | Facility: CLINIC | Age: 36
Discharge: HOME | End: 2021-10-25
Attending: INTERNAL MEDICINE | Admitting: INTERNAL MEDICINE
Payer: COMMERCIAL

## 2021-10-25 DIAGNOSIS — R18.8 OTHER ASCITES: ICD-10-CM

## 2021-10-25 PROCEDURE — 49083 ABD PARACENTESIS W/IMAGING: CPT

## 2021-10-25 PROCEDURE — 250N000011 HC RX IP 250 OP 636: Performed by: INTERNAL MEDICINE

## 2021-10-25 PROCEDURE — P9047 ALBUMIN (HUMAN), 25%, 50ML: HCPCS | Performed by: INTERNAL MEDICINE

## 2021-10-25 RX ORDER — ALBUMIN (HUMAN) 12.5 G/50ML
25-75 SOLUTION INTRAVENOUS ONCE
Status: COMPLETED | OUTPATIENT
Start: 2021-10-25 | End: 2021-10-25

## 2021-10-25 RX ADMIN — ALBUMIN HUMAN 50 G: 0.25 SOLUTION INTRAVENOUS at 11:26

## 2021-10-25 NOTE — IR NOTE
Patient tolerated paracentesis with albumin with no concerns expressed or observed.    7.3 L removed with 50 grams albumin given per order.

## 2021-10-26 ENCOUNTER — TELEPHONE (OUTPATIENT)
Dept: RADIOLOGY | Facility: CLINIC | Age: 36
End: 2021-10-26

## 2021-10-26 DIAGNOSIS — Z95.828 S/P TIPS (TRANSJUGULAR INTRAHEPATIC PORTOSYSTEMIC SHUNT): Primary | ICD-10-CM

## 2021-10-26 NOTE — TELEPHONE ENCOUNTER
I spoke with Christo.  He is doing well, still needing para's every 7-10 days for 7L at [facility].  He is willing to have different provider to his TIPS check procedure if it would help improve his quality of life.  CLIFTON Araiza RN, BSN  Interventional Radiology Nurse Coordinator   Phone:  [phone]

## 2021-10-29 DIAGNOSIS — Z11.59 ENCOUNTER FOR SCREENING FOR OTHER VIRAL DISEASES: ICD-10-CM

## 2021-11-03 ENCOUNTER — HOSPITAL ENCOUNTER (OUTPATIENT)
Dept: ULTRASOUND IMAGING | Facility: CLINIC | Age: 36
Discharge: HOME | End: 2021-11-03
Attending: INTERNAL MEDICINE | Admitting: INTERNAL MEDICINE
Payer: COMMERCIAL

## 2021-11-03 DIAGNOSIS — R18.8 OTHER ASCITES: ICD-10-CM

## 2021-11-03 PROCEDURE — P9047 ALBUMIN (HUMAN), 25%, 50ML: HCPCS | Performed by: INTERNAL MEDICINE

## 2021-11-03 PROCEDURE — 49083 ABD PARACENTESIS W/IMAGING: CPT

## 2021-11-03 PROCEDURE — 250N000011 HC RX IP 250 OP 636: Performed by: INTERNAL MEDICINE

## 2021-11-03 RX ORDER — ALBUMIN (HUMAN) 12.5 G/50ML
25-100 SOLUTION INTRAVENOUS ONCE
Status: COMPLETED | OUTPATIENT
Start: 2021-11-03 | End: 2021-11-03

## 2021-11-03 RX ADMIN — ALBUMIN HUMAN 50 G: 0.25 SOLUTION INTRAVENOUS at 12:37

## 2021-11-03 NOTE — PROGRESS NOTES
Patient tolerated paracentesis with albumin with no concerns expressed or observed.    6.2 L removed with 50 grams albumin given per order. Tolerated well.    Katherin Gomez, RN

## 2021-11-09 ENCOUNTER — LAB (OUTPATIENT)
Dept: LAB | Facility: CLINIC | Age: 36
End: 2021-11-09
Payer: COMMERCIAL

## 2021-11-09 DIAGNOSIS — Z11.59 ENCOUNTER FOR SCREENING FOR OTHER VIRAL DISEASES: ICD-10-CM

## 2021-11-09 PROCEDURE — U0003 INFECTIOUS AGENT DETECTION BY NUCLEIC ACID (DNA OR RNA); SEVERE ACUTE RESPIRATORY SYNDROME CORONAVIRUS 2 (SARS-COV-2) (CORONAVIRUS DISEASE [COVID-19]), AMPLIFIED PROBE TECHNIQUE, MAKING USE OF HIGH THROUGHPUT TECHNOLOGIES AS DESCRIBED BY CMS-2020-01-R: HCPCS

## 2021-11-09 PROCEDURE — U0005 INFEC AGEN DETEC AMPLI PROBE: HCPCS

## 2021-11-10 LAB — SARS-COV-2 RNA RESP QL NAA+PROBE: NEGATIVE

## 2021-11-12 ENCOUNTER — HOSPITAL ENCOUNTER (OUTPATIENT)
Dept: ULTRASOUND IMAGING | Facility: CLINIC | Age: 36
Discharge: HOME | End: 2021-11-12
Attending: INTERNAL MEDICINE | Admitting: INTERNAL MEDICINE
Payer: COMMERCIAL

## 2021-11-12 DIAGNOSIS — R18.8 OTHER ASCITES: ICD-10-CM

## 2021-11-12 PROCEDURE — 250N000011 HC RX IP 250 OP 636: Performed by: INTERNAL MEDICINE

## 2021-11-12 PROCEDURE — 49083 ABD PARACENTESIS W/IMAGING: CPT

## 2021-11-12 PROCEDURE — P9047 ALBUMIN (HUMAN), 25%, 50ML: HCPCS | Performed by: INTERNAL MEDICINE

## 2021-11-12 RX ORDER — ALBUMIN (HUMAN) 12.5 G/50ML
25-75 SOLUTION INTRAVENOUS ONCE
Status: COMPLETED | OUTPATIENT
Start: 2021-11-12 | End: 2021-11-12

## 2021-11-12 RX ADMIN — ALBUMIN HUMAN 50 G: 0.25 SOLUTION INTRAVENOUS at 09:18

## 2021-11-12 NOTE — PROGRESS NOTES
Patient tolerated paracentesis with albumin with no concerns expressed or observed.    7.15 L removed with 50 grams albumin given per order. VSS    Katherin Gomez RN

## 2021-11-14 DIAGNOSIS — Z11.59 ENCOUNTER FOR SCREENING FOR OTHER VIRAL DISEASES: ICD-10-CM

## 2021-11-17 ENCOUNTER — ANCILLARY PROCEDURE (OUTPATIENT)
Dept: CT IMAGING | Facility: CLINIC | Age: 36
End: 2021-11-17
Attending: RADIOLOGY
Payer: COMMERCIAL

## 2021-11-17 DIAGNOSIS — Z95.828 S/P TIPS (TRANSJUGULAR INTRAHEPATIC PORTOSYSTEMIC SHUNT): ICD-10-CM

## 2021-11-17 PROCEDURE — 74176 CT ABD & PELVIS W/O CONTRAST: CPT | Performed by: RADIOLOGY

## 2021-11-22 ENCOUNTER — HOSPITAL ENCOUNTER (OUTPATIENT)
Dept: ULTRASOUND IMAGING | Facility: CLINIC | Age: 36
Discharge: HOME | End: 2021-11-22
Attending: INTERNAL MEDICINE | Admitting: INTERNAL MEDICINE
Payer: COMMERCIAL

## 2021-11-22 ENCOUNTER — LAB (OUTPATIENT)
Dept: LAB | Facility: CLINIC | Age: 36
End: 2021-11-22
Attending: RADIOLOGY
Payer: COMMERCIAL

## 2021-11-22 DIAGNOSIS — R18.8 OTHER ASCITES: ICD-10-CM

## 2021-11-22 DIAGNOSIS — Z11.59 ENCOUNTER FOR SCREENING FOR OTHER VIRAL DISEASES: ICD-10-CM

## 2021-11-22 PROCEDURE — 49083 ABD PARACENTESIS W/IMAGING: CPT

## 2021-11-22 PROCEDURE — U0003 INFECTIOUS AGENT DETECTION BY NUCLEIC ACID (DNA OR RNA); SEVERE ACUTE RESPIRATORY SYNDROME CORONAVIRUS 2 (SARS-COV-2) (CORONAVIRUS DISEASE [COVID-19]), AMPLIFIED PROBE TECHNIQUE, MAKING USE OF HIGH THROUGHPUT TECHNOLOGIES AS DESCRIBED BY CMS-2020-01-R: HCPCS

## 2021-11-22 PROCEDURE — 250N000011 HC RX IP 250 OP 636: Performed by: INTERNAL MEDICINE

## 2021-11-22 PROCEDURE — U0005 INFEC AGEN DETEC AMPLI PROBE: HCPCS

## 2021-11-22 PROCEDURE — P9047 ALBUMIN (HUMAN), 25%, 50ML: HCPCS | Performed by: INTERNAL MEDICINE

## 2021-11-22 RX ORDER — ALBUMIN (HUMAN) 12.5 G/50ML
25-75 SOLUTION INTRAVENOUS ONCE
Status: COMPLETED | OUTPATIENT
Start: 2021-11-22 | End: 2021-11-22

## 2021-11-22 RX ADMIN — ALBUMIN HUMAN 50 G: 0.25 SOLUTION INTRAVENOUS at 12:39

## 2021-11-22 NOTE — PROGRESS NOTES
Patient tolerated paracentesis with albumin with no concerns expressed or observed.    6 L removed with 50 grams albumin given per order.    Katherin Gomez RN

## 2021-11-23 LAB — SARS-COV-2 RNA RESP QL NAA+PROBE: NEGATIVE

## 2021-11-24 ENCOUNTER — HOSPITAL ENCOUNTER (OUTPATIENT)
Facility: CLINIC | Age: 36
Discharge: HOME | End: 2021-11-24
Attending: RADIOLOGY | Admitting: RADIOLOGY
Payer: COMMERCIAL

## 2021-11-24 ENCOUNTER — APPOINTMENT (OUTPATIENT)
Dept: INTERVENTIONAL RADIOLOGY/VASCULAR | Facility: CLINIC | Age: 36
End: 2021-11-24
Attending: RADIOLOGY
Payer: COMMERCIAL

## 2021-11-24 ENCOUNTER — APPOINTMENT (OUTPATIENT)
Dept: MEDSURG UNIT | Facility: CLINIC | Age: 36
End: 2021-11-24
Attending: RADIOLOGY
Payer: COMMERCIAL

## 2021-11-24 VITALS
HEART RATE: 75 BPM | SYSTOLIC BLOOD PRESSURE: 111 MMHG | OXYGEN SATURATION: 95 % | TEMPERATURE: 97.6 F | HEIGHT: 67 IN | DIASTOLIC BLOOD PRESSURE: 72 MMHG | BODY MASS INDEX: 21.19 KG/M2 | WEIGHT: 135 LBS | RESPIRATION RATE: 16 BRPM

## 2021-11-24 DIAGNOSIS — Z95.828 S/P TIPS (TRANSJUGULAR INTRAHEPATIC PORTOSYSTEMIC SHUNT): ICD-10-CM

## 2021-11-24 LAB
ALBUMIN SERPL-MCNC: 3.1 G/DL (ref 3.4–5)
ALP SERPL-CCNC: 117 U/L (ref 40–150)
ALT SERPL W P-5'-P-CCNC: 24 U/L (ref 0–70)
ANION GAP SERPL CALCULATED.3IONS-SCNC: 6 MMOL/L (ref 3–14)
APTT PPP: 32 SECONDS (ref 22–38)
AST SERPL W P-5'-P-CCNC: 36 U/L (ref 0–45)
BILIRUB DIRECT SERPL-MCNC: 0.6 MG/DL (ref 0–0.2)
BILIRUB SERPL-MCNC: 1.6 MG/DL (ref 0.2–1.3)
BUN SERPL-MCNC: 22 MG/DL (ref 7–30)
CALCIUM SERPL-MCNC: 8.8 MG/DL (ref 8.5–10.1)
CHLORIDE BLD-SCNC: 111 MMOL/L (ref 94–109)
CO2 SERPL-SCNC: 22 MMOL/L (ref 20–32)
CREAT SERPL-MCNC: 1.03 MG/DL (ref 0.66–1.25)
ERYTHROCYTE [DISTWIDTH] IN BLOOD BY AUTOMATED COUNT: 13.7 % (ref 10–15)
GFR SERPL CREATININE-BSD FRML MDRD: >90 ML/MIN/1.73M2
GLUCOSE BLD-MCNC: 92 MG/DL (ref 70–99)
HCT VFR BLD AUTO: 43.6 % (ref 40–53)
HGB BLD-MCNC: 14 G/DL (ref 13.3–17.7)
INR PPP: 1.19 (ref 0.85–1.15)
MCH RBC QN AUTO: 31.4 PG (ref 26.5–33)
MCHC RBC AUTO-ENTMCNC: 32.1 G/DL (ref 31.5–36.5)
MCV RBC AUTO: 98 FL (ref 78–100)
PLATELET # BLD AUTO: 98 10E3/UL (ref 150–450)
POTASSIUM BLD-SCNC: 3.8 MMOL/L (ref 3.4–5.3)
PROT SERPL-MCNC: 5.7 G/DL (ref 6.8–8.8)
RBC # BLD AUTO: 4.46 10E6/UL (ref 4.4–5.9)
SODIUM SERPL-SCNC: 139 MMOL/L (ref 133–144)
WBC # BLD AUTO: 3.8 10E3/UL (ref 4–11)

## 2021-11-24 PROCEDURE — 99152 MOD SED SAME PHYS/QHP 5/>YRS: CPT

## 2021-11-24 PROCEDURE — 272N000302 HC DEVICE INFLATION CR5

## 2021-11-24 PROCEDURE — C1769 GUIDE WIRE: HCPCS

## 2021-11-24 PROCEDURE — C1887 CATHETER, GUIDING: HCPCS

## 2021-11-24 PROCEDURE — 36415 COLL VENOUS BLD VENIPUNCTURE: CPT | Performed by: NURSE PRACTITIONER

## 2021-11-24 PROCEDURE — 85730 THROMBOPLASTIN TIME PARTIAL: CPT | Mod: GZ | Performed by: NURSE PRACTITIONER

## 2021-11-24 PROCEDURE — 85027 COMPLETE CBC AUTOMATED: CPT | Performed by: NURSE PRACTITIONER

## 2021-11-24 PROCEDURE — 999N000142 HC STATISTIC PROCEDURE PREP ONLY

## 2021-11-24 PROCEDURE — 999N000133 HC STATISTIC PP CARE STAGE 2

## 2021-11-24 PROCEDURE — 258N000003 HC RX IP 258 OP 636: Performed by: NURSE PRACTITIONER

## 2021-11-24 PROCEDURE — 272N000504 HC NEEDLE CR4

## 2021-11-24 PROCEDURE — 250N000011 HC RX IP 250 OP 636: Performed by: STUDENT IN AN ORGANIZED HEALTH CARE EDUCATION/TRAINING PROGRAM

## 2021-11-24 PROCEDURE — 272N000192 HC ACCESSORY CR2

## 2021-11-24 PROCEDURE — 99152 MOD SED SAME PHYS/QHP 5/>YRS: CPT | Performed by: RADIOLOGY

## 2021-11-24 PROCEDURE — 99153 MOD SED SAME PHYS/QHP EA: CPT

## 2021-11-24 PROCEDURE — 80048 BASIC METABOLIC PNL TOTAL CA: CPT | Performed by: NURSE PRACTITIONER

## 2021-11-24 PROCEDURE — 37183 REVISION TIPS: CPT

## 2021-11-24 PROCEDURE — 37183 REVISION TIPS: CPT | Mod: GC | Performed by: RADIOLOGY

## 2021-11-24 PROCEDURE — 250N000011 HC RX IP 250 OP 636: Performed by: NURSE PRACTITIONER

## 2021-11-24 PROCEDURE — C1874 STENT, COATED/COV W/DEL SYS: HCPCS

## 2021-11-24 PROCEDURE — 272N000570 HC SHEATH CR7

## 2021-11-24 PROCEDURE — 85610 PROTHROMBIN TIME: CPT | Performed by: NURSE PRACTITIONER

## 2021-11-24 PROCEDURE — 82248 BILIRUBIN DIRECT: CPT | Performed by: NURSE PRACTITIONER

## 2021-11-24 PROCEDURE — C1725 CATH, TRANSLUMIN NON-LASER: HCPCS

## 2021-11-24 PROCEDURE — 250N000009 HC RX 250: Performed by: STUDENT IN AN ORGANIZED HEALTH CARE EDUCATION/TRAINING PROGRAM

## 2021-11-24 RX ORDER — NALOXONE HYDROCHLORIDE 0.4 MG/ML
0.4 INJECTION, SOLUTION INTRAMUSCULAR; INTRAVENOUS; SUBCUTANEOUS
Status: DISCONTINUED | OUTPATIENT
Start: 2021-11-24 | End: 2021-11-24 | Stop reason: HOSPADM

## 2021-11-24 RX ORDER — IODIXANOL 320 MG/ML
150 INJECTION, SOLUTION INTRAVASCULAR ONCE
Status: DISCONTINUED | OUTPATIENT
Start: 2021-11-24 | End: 2021-11-24 | Stop reason: HOSPADM

## 2021-11-24 RX ORDER — NALOXONE HYDROCHLORIDE 0.4 MG/ML
0.2 INJECTION, SOLUTION INTRAMUSCULAR; INTRAVENOUS; SUBCUTANEOUS
Status: DISCONTINUED | OUTPATIENT
Start: 2021-11-24 | End: 2021-11-24 | Stop reason: HOSPADM

## 2021-11-24 RX ORDER — FENTANYL CITRATE 50 UG/ML
25-50 INJECTION, SOLUTION INTRAMUSCULAR; INTRAVENOUS EVERY 5 MIN PRN
Status: DISCONTINUED | OUTPATIENT
Start: 2021-11-24 | End: 2021-11-24 | Stop reason: HOSPADM

## 2021-11-24 RX ORDER — LIDOCAINE 40 MG/G
CREAM TOPICAL
Status: DISCONTINUED | OUTPATIENT
Start: 2021-11-24 | End: 2021-11-24 | Stop reason: HOSPADM

## 2021-11-24 RX ORDER — SODIUM CHLORIDE 9 MG/ML
INJECTION, SOLUTION INTRAVENOUS CONTINUOUS
Status: DISCONTINUED | OUTPATIENT
Start: 2021-11-24 | End: 2021-11-24 | Stop reason: HOSPADM

## 2021-11-24 RX ORDER — FLUMAZENIL 0.1 MG/ML
0.2 INJECTION, SOLUTION INTRAVENOUS
Status: DISCONTINUED | OUTPATIENT
Start: 2021-11-24 | End: 2021-11-24 | Stop reason: HOSPADM

## 2021-11-24 RX ORDER — HEPARIN SODIUM 200 [USP'U]/100ML
1 INJECTION, SOLUTION INTRAVENOUS CONTINUOUS PRN
Status: DISCONTINUED | OUTPATIENT
Start: 2021-11-24 | End: 2021-11-24 | Stop reason: HOSPADM

## 2021-11-24 RX ADMIN — MIDAZOLAM 0.5 MG: 1 INJECTION INTRAMUSCULAR; INTRAVENOUS at 11:30

## 2021-11-24 RX ADMIN — FENTANYL CITRATE 25 MCG: 50 INJECTION INTRAMUSCULAR; INTRAVENOUS at 11:19

## 2021-11-24 RX ADMIN — LIDOCAINE HYDROCHLORIDE 5 ML: 10 INJECTION, SOLUTION EPIDURAL; INFILTRATION; INTRACAUDAL; PERINEURAL at 11:09

## 2021-11-24 RX ADMIN — SODIUM CHLORIDE: 9 INJECTION, SOLUTION INTRAVENOUS at 09:59

## 2021-11-24 RX ADMIN — HEPARIN SODIUM 1 BAG: 200 INJECTION, SOLUTION INTRAVENOUS at 11:11

## 2021-11-24 RX ADMIN — MIDAZOLAM 1 MG: 1 INJECTION INTRAMUSCULAR; INTRAVENOUS at 10:59

## 2021-11-24 RX ADMIN — MIDAZOLAM 0.5 MG: 1 INJECTION INTRAMUSCULAR; INTRAVENOUS at 11:33

## 2021-11-24 RX ADMIN — FENTANYL CITRATE 25 MCG: 50 INJECTION INTRAMUSCULAR; INTRAVENOUS at 11:34

## 2021-11-24 RX ADMIN — FENTANYL CITRATE 50 MCG: 50 INJECTION INTRAMUSCULAR; INTRAVENOUS at 11:00

## 2021-11-24 RX ADMIN — MIDAZOLAM 0.5 MG: 1 INJECTION INTRAMUSCULAR; INTRAVENOUS at 11:19

## 2021-11-24 RX ADMIN — FENTANYL CITRATE 25 MCG: 50 INJECTION INTRAMUSCULAR; INTRAVENOUS at 11:30

## 2021-11-24 RX ADMIN — FENTANYL CITRATE 25 MCG: 50 INJECTION INTRAMUSCULAR; INTRAVENOUS at 11:05

## 2021-11-24 RX ADMIN — MIDAZOLAM 0.5 MG: 1 INJECTION INTRAMUSCULAR; INTRAVENOUS at 11:05

## 2021-11-24 ASSESSMENT — MIFFLIN-ST. JEOR: SCORE: 1505.99

## 2021-11-24 NOTE — IR NOTE
Patient Name: Christo Herrera  Medical Record Number: [id number]  Today's Date: 11/24/2021    Procedure: Transjugular intrahepatic portosystemic shunt revision,   Proceduralist: Dr Sepideh Dumont mD    Procedure Start: 1058  Procedure end: 1140  Sedation medications administered: 150 mcg fentanyl, 3 mg versed     Report given to: 2A RN  : none    Other Notes: Pt arrived to IR room 4 from . Consent reviewed. Pt denies any questions or concerns regarding procedure. Pt positioned supine and monitored per protocol. Pt tolerated procedure without any noted complications. Pt transferred back to .  Right internal jugular accessed.

## 2021-11-24 NOTE — PROGRESS NOTES
Returned from IR to 2A post procedure.  Denies pain.  Three sites:  RUQ, mid low back & RIJV all C/D/I.  Wife waiting with him in room.  Taking ice chips, does not want anything else at this time.  Sleepy & wants to nap.

## 2021-11-24 NOTE — PROGRESS NOTES
Pt returned from IR via liter accompanied by nurse at 1145.Right neck dressing had blood on the dressing and marked.Pt tolerating food and fluids.

## 2021-11-24 NOTE — PROCEDURES
[facility]    Procedure: TIPS Revision    Date/Time: 11/24/2021 11:46 AM  Performed by: Khurram Bunn  Authorized by: Khurram Bunn   IR Fellow Physician: Sepideh  Other(s) attending procedure: Young - staff      UNIVERSAL PROTOCOL   Site Marked: Yes  Prior Images Obtained and Reviewed:  Yes  Required items: Required blood products, implants, devices and special equipment available    Patient identity confirmed:  Verbally with patient  Patient was reevaluated immediately before administering moderate or deep sedation or anesthesia  Confirmation Checklist:  Patient's identity using two indicators  Time out: Immediately prior to the procedure a time out was called    Universal Protocol: the Joint Commission Universal Protocol was followed    Preparation: Patient was prepped and draped in usual sterile fashion    ESBL (mL):  5     ANESTHESIA    Anesthesia: Local infiltration  Local Anesthetic:  Lidocaine 1% without epinephrine  Anesthetic Total (mL):  10      SEDATION  Patient Sedated: Yes    Sedation Type:  Moderate (conscious) sedation  Sedation:  Fentanyl and midazolam  Vital signs: Vital signs monitored during sedation    See dictated procedure note for full details.  Findings: TIPS mid stent stenosis.     Specimens: none    Complications: None    Condition: Stable    Plan: Return to 2A. Follow up in IR clinic in 1 month.       PROCEDURE  Describe Procedure: TIPS mid stent stenosis treated with 6 mm balloon. TIPS stent extended with a 4 x 2 Viatorr stent at hepatic venous end.   Patient Tolerance:  Patient tolerated the procedure well with no immediate complications  Length of time physician/provider present for 1:1 monitoring during sedation: 60

## 2021-11-24 NOTE — PROGRESS NOTES
Patient tolerated recovery stage well. VSS, RIJV site clean/dry/intact, no hematoma, and denies pain. Patient tolerated PO food and fluids. Teaching was done and discharge instructions were given. Patient ambulated, voided, and PIV was removed.   Meets discharge criteria, awaiting family.

## 2021-11-24 NOTE — PROGRESS NOTES
Prepped for TIPS revision.  Denies pain.  Mother available for post-procedure transport, off-site.  H & P updated.  Labs pending.  Consent done.

## 2021-11-24 NOTE — PROGRESS NOTES
Pressures:     Pre:   PV End: 34  Mid: 33  HV End: 34  RA: 13    Post Plasty:   MPV: 30  PV End: 30  Mid: 30  HV End: 30  RA: 17    Post Stent Ext:   PV End: 26  Mid: 21  HV End: 16  RA: 13

## 2021-11-24 NOTE — PRE-PROCEDURE
GENERAL PRE-PROCEDURE:   Procedure:  TIPS revision. Possible paracentesis.   Date/Time:  11/24/2021 9:50 AM    Verbal consent obtained?: Yes    Written consent obtained?: Yes    Risks and benefits: Risks, benefits and alternatives were discussed    Consent given by:  Patient  Patient states understanding of procedure being performed: Yes    Patient's understanding of procedure matches consent: Yes    Procedure consent matches procedure scheduled: Yes    Expected level of sedation:  Moderate  Appropriately NPO:  Yes  ASA Class:  2  Mallampati  :  Grade 2- soft palate, base of uvula, tonsillar pillars, and portion of posterior pharyngeal wall visible  Lungs:  Lungs clear with good breath sounds bilaterally  Heart:  Normal heart sounds and rate  History & Physical reviewed:  History and physical reviewed and no updates needed  Statement of review:  I have reviewed the lab findings, diagnostic data, medications, and the plan for sedation

## 2021-11-24 NOTE — DISCHARGE INSTRUCTIONS
[facility]  Going Home after TIPS Revision                                                     After you go home:    Drink plenty of fluids.    Resume your normal diet    Do not scrub the procedure site vigorously for 3 days    No lotion or powder to the puncture site for 3 days    DO NOT do any strenuous exercise or lifting greater than 10 pounds for at least 2 days following your procedure    HOB elevated to at least 30 degrees. Do not lay flat for at least 2 hours after you go home.      IF YOU WERE GIVEN SEDATION    No driving or alcoholic beverages today    Do not make any important or legal decisions today    We recommend an adult stay with you for the first 24 hours    CALL THE PHYSICIAN IF:    Monitor neck site for bleeding, swelling. If any bleeding or swelling immediately apply pressure and call the doctor.    If you notice any changes in your voice or breathing you should call your doctor immediately & come to the closest Emergency Department.  Do Not Drive Yourself.    You develop nausea or vomiting    You develop hives or a rash or any unexplained itching        [facility] INTERVENTIONAL RADIOLOGY DEPARTMENT        Procedure Physician:    Dr. Ferrari & Dr. Mcdonnell                                     Date of procedure:November 24, 2021        Telephone numbers:     [phone]  Monday-Friday 8:00 am to 4:30 pm                                              [phone]  After 4:30 pm Monday-Friday, Weekends & Holidays. Ask for the Interventional Radiologist on call.Someone is available  24 hours/day        [facility] toll free number: [phone]  Monday-Friday 8:00 am to 4:30 pm

## 2021-11-25 DIAGNOSIS — Z11.59 ENCOUNTER FOR SCREENING FOR OTHER VIRAL DISEASES: ICD-10-CM

## 2021-11-26 ENCOUNTER — TELEPHONE (OUTPATIENT)
Dept: RADIOLOGY | Facility: CLINIC | Age: 36
End: 2021-11-26
Payer: COMMERCIAL

## 2021-11-26 DIAGNOSIS — Z95.828 S/P TIPS (TRANSJUGULAR INTRAHEPATIC PORTOSYSTEMIC SHUNT): Primary | ICD-10-CM

## 2021-11-26 NOTE — TELEPHONE ENCOUNTER
I called and left a voicemail including my call back number.  I called to see how Christo was after his TIPS revision by Dr Ferrari (for Dr Pizraro).  Plan is for 1 month f/up TIPS us and video visit.  CLIFTON Araiza RN, BSN  Interventional Radiology Nurse Coordinator   Phone:  [phone]

## 2021-11-29 NOTE — TELEPHONE ENCOUNTER
I called 2nd attempt, left vm, follow up on TIPS revision.  CLIFTON Araiza, RN, BSN  Interventional Radiology Nurse Coordinator   Phone:  [phone]

## 2021-12-01 ENCOUNTER — HOSPITAL ENCOUNTER (OUTPATIENT)
Dept: ULTRASOUND IMAGING | Facility: CLINIC | Age: 36
Discharge: HOME | End: 2021-12-01
Attending: INTERNAL MEDICINE | Admitting: RADIOLOGY
Payer: COMMERCIAL

## 2021-12-01 DIAGNOSIS — R18.8 OTHER ASCITES: ICD-10-CM

## 2021-12-01 PROCEDURE — 49083 ABD PARACENTESIS W/IMAGING: CPT

## 2021-12-01 PROCEDURE — P9047 ALBUMIN (HUMAN), 25%, 50ML: HCPCS | Performed by: INTERNAL MEDICINE

## 2021-12-01 PROCEDURE — 250N000011 HC RX IP 250 OP 636: Performed by: INTERNAL MEDICINE

## 2021-12-01 PROCEDURE — 272N000021 US PARACENTESIS WITH ALBUMIN

## 2021-12-01 RX ORDER — ALBUMIN (HUMAN) 12.5 G/50ML
25-75 SOLUTION INTRAVENOUS ONCE
Status: COMPLETED | OUTPATIENT
Start: 2021-12-01 | End: 2021-12-01

## 2021-12-01 RX ADMIN — ALBUMIN HUMAN 25 G: 0.25 SOLUTION INTRAVENOUS at 09:20

## 2021-12-01 NOTE — PROGRESS NOTES
Patient tolerated paracentesis with albumin with no concerns expressed or observed.    4.2 L removed with 25 grams albumin given per order.    Katherin Gomez RN

## 2021-12-07 ENCOUNTER — LAB (OUTPATIENT)
Dept: LAB | Facility: CLINIC | Age: 36
End: 2021-12-07
Payer: COMMERCIAL

## 2021-12-07 DIAGNOSIS — Z11.59 ENCOUNTER FOR SCREENING FOR OTHER VIRAL DISEASES: ICD-10-CM

## 2021-12-07 PROCEDURE — U0005 INFEC AGEN DETEC AMPLI PROBE: HCPCS

## 2021-12-07 PROCEDURE — U0003 INFECTIOUS AGENT DETECTION BY NUCLEIC ACID (DNA OR RNA); SEVERE ACUTE RESPIRATORY SYNDROME CORONAVIRUS 2 (SARS-COV-2) (CORONAVIRUS DISEASE [COVID-19]), AMPLIFIED PROBE TECHNIQUE, MAKING USE OF HIGH THROUGHPUT TECHNOLOGIES AS DESCRIBED BY CMS-2020-01-R: HCPCS

## 2021-12-08 LAB — SARS-COV-2 RNA RESP QL NAA+PROBE: NEGATIVE

## 2021-12-10 ENCOUNTER — HOSPITAL ENCOUNTER (OUTPATIENT)
Dept: ULTRASOUND IMAGING | Facility: CLINIC | Age: 36
Discharge: HOME | End: 2021-12-10
Attending: INTERNAL MEDICINE | Admitting: RADIOLOGY
Payer: COMMERCIAL

## 2021-12-10 DIAGNOSIS — R18.8 OTHER ASCITES: ICD-10-CM

## 2021-12-10 PROCEDURE — 49083 ABD PARACENTESIS W/IMAGING: CPT

## 2021-12-14 DIAGNOSIS — Z11.59 ENCOUNTER FOR SCREENING FOR OTHER VIRAL DISEASES: ICD-10-CM

## 2021-12-20 ENCOUNTER — HOSPITAL ENCOUNTER (OUTPATIENT)
Dept: ULTRASOUND IMAGING | Facility: CLINIC | Age: 36
Discharge: HOME | End: 2021-12-20
Attending: INTERNAL MEDICINE | Admitting: RADIOLOGY
Payer: COMMERCIAL

## 2021-12-20 DIAGNOSIS — R18.8 OTHER ASCITES: ICD-10-CM

## 2021-12-20 PROCEDURE — 49083 ABD PARACENTESIS W/IMAGING: CPT

## 2021-12-21 ENCOUNTER — ANCILLARY PROCEDURE (OUTPATIENT)
Dept: ULTRASOUND IMAGING | Facility: CLINIC | Age: 36
End: 2021-12-21
Attending: RADIOLOGY
Payer: COMMERCIAL

## 2021-12-21 DIAGNOSIS — Z95.828 S/P TIPS (TRANSJUGULAR INTRAHEPATIC PORTOSYSTEMIC SHUNT): ICD-10-CM

## 2021-12-21 PROCEDURE — 93975 VASCULAR STUDY: CPT | Mod: GC | Performed by: RADIOLOGY

## 2021-12-28 ENCOUNTER — LAB (OUTPATIENT)
Dept: LAB | Facility: CLINIC | Age: 36
End: 2021-12-28
Payer: COMMERCIAL

## 2021-12-28 DIAGNOSIS — Z11.59 ENCOUNTER FOR SCREENING FOR OTHER VIRAL DISEASES: ICD-10-CM

## 2021-12-28 PROCEDURE — U0005 INFEC AGEN DETEC AMPLI PROBE: HCPCS

## 2021-12-28 PROCEDURE — U0003 INFECTIOUS AGENT DETECTION BY NUCLEIC ACID (DNA OR RNA); SEVERE ACUTE RESPIRATORY SYNDROME CORONAVIRUS 2 (SARS-COV-2) (CORONAVIRUS DISEASE [COVID-19]), AMPLIFIED PROBE TECHNIQUE, MAKING USE OF HIGH THROUGHPUT TECHNOLOGIES AS DESCRIBED BY CMS-2020-01-R: HCPCS

## 2021-12-29 LAB — SARS-COV-2 RNA RESP QL NAA+PROBE: NEGATIVE

## 2021-12-30 ENCOUNTER — HOSPITAL ENCOUNTER (OUTPATIENT)
Dept: ULTRASOUND IMAGING | Facility: CLINIC | Age: 36
Discharge: HOME | End: 2021-12-30
Attending: INTERNAL MEDICINE | Admitting: RADIOLOGY
Payer: COMMERCIAL

## 2021-12-30 DIAGNOSIS — R18.8 OTHER ASCITES: ICD-10-CM

## 2021-12-30 PROCEDURE — 49083 ABD PARACENTESIS W/IMAGING: CPT

## 2022-01-03 DIAGNOSIS — Z95.828 S/P TIPS (TRANSJUGULAR INTRAHEPATIC PORTOSYSTEMIC SHUNT): Primary | ICD-10-CM

## 2022-01-03 DIAGNOSIS — K70.31 ALCOHOLIC CIRRHOSIS OF LIVER WITH ASCITES (H): ICD-10-CM

## 2022-01-04 ENCOUNTER — LAB (OUTPATIENT)
Dept: LAB | Facility: CLINIC | Age: 37
End: 2022-01-04
Attending: INTERNAL MEDICINE
Payer: COMMERCIAL

## 2022-01-04 ENCOUNTER — OFFICE VISIT (OUTPATIENT)
Dept: GASTROENTEROLOGY | Facility: CLINIC | Age: 37
End: 2022-01-04
Attending: INTERNAL MEDICINE
Payer: COMMERCIAL

## 2022-01-04 VITALS
HEIGHT: 67 IN | WEIGHT: 134.5 LBS | BODY MASS INDEX: 21.11 KG/M2 | OXYGEN SATURATION: 98 % | DIASTOLIC BLOOD PRESSURE: 68 MMHG | SYSTOLIC BLOOD PRESSURE: 128 MMHG | HEART RATE: 83 BPM

## 2022-01-04 DIAGNOSIS — Z95.828 S/P TIPS (TRANSJUGULAR INTRAHEPATIC PORTOSYSTEMIC SHUNT): ICD-10-CM

## 2022-01-04 DIAGNOSIS — K70.31 ALCOHOLIC CIRRHOSIS OF LIVER WITH ASCITES (H): ICD-10-CM

## 2022-01-04 DIAGNOSIS — Z95.828 S/P TIPS (TRANSJUGULAR INTRAHEPATIC PORTOSYSTEMIC SHUNT): Primary | ICD-10-CM

## 2022-01-04 LAB
ALBUMIN SERPL-MCNC: 3.5 G/DL (ref 3.4–5)
ALP SERPL-CCNC: 139 U/L (ref 40–150)
ALT SERPL W P-5'-P-CCNC: 40 U/L (ref 0–70)
ANION GAP SERPL CALCULATED.3IONS-SCNC: 8 MMOL/L (ref 3–14)
AST SERPL W P-5'-P-CCNC: 45 U/L (ref 0–45)
BILIRUB DIRECT SERPL-MCNC: 0.6 MG/DL (ref 0–0.2)
BILIRUB SERPL-MCNC: 1.7 MG/DL (ref 0.2–1.3)
BUN SERPL-MCNC: 24 MG/DL (ref 7–30)
CALCIUM SERPL-MCNC: 9.4 MG/DL (ref 8.5–10.1)
CHLORIDE BLD-SCNC: 112 MMOL/L (ref 94–109)
CO2 SERPL-SCNC: 22 MMOL/L (ref 20–32)
CREAT SERPL-MCNC: 1.07 MG/DL (ref 0.66–1.25)
ERYTHROCYTE [DISTWIDTH] IN BLOOD BY AUTOMATED COUNT: 13.7 % (ref 10–15)
GFR SERPL CREATININE-BSD FRML MDRD: >90 ML/MIN/1.73M2
GLUCOSE BLD-MCNC: 97 MG/DL (ref 70–99)
HCT VFR BLD AUTO: 42.7 % (ref 40–53)
HGB BLD-MCNC: 14.2 G/DL (ref 13.3–17.7)
INR PPP: 1.17 (ref 0.85–1.15)
MCH RBC QN AUTO: 31.5 PG (ref 26.5–33)
MCHC RBC AUTO-ENTMCNC: 33.3 G/DL (ref 31.5–36.5)
MCV RBC AUTO: 95 FL (ref 78–100)
PLATELET # BLD AUTO: 103 10E3/UL (ref 150–450)
POTASSIUM BLD-SCNC: 4.2 MMOL/L (ref 3.4–5.3)
PROT SERPL-MCNC: 6.4 G/DL (ref 6.8–8.8)
RBC # BLD AUTO: 4.51 10E6/UL (ref 4.4–5.9)
SODIUM SERPL-SCNC: 142 MMOL/L (ref 133–144)
WBC # BLD AUTO: 4.1 10E3/UL (ref 4–11)

## 2022-01-04 PROCEDURE — 99000 SPECIMEN HANDLING OFFICE-LAB: CPT | Performed by: PATHOLOGY

## 2022-01-04 PROCEDURE — 80321 ALCOHOLS BIOMARKERS 1OR 2: CPT | Mod: 90 | Performed by: PATHOLOGY

## 2022-01-04 PROCEDURE — G0463 HOSPITAL OUTPT CLINIC VISIT: HCPCS

## 2022-01-04 PROCEDURE — 85610 PROTHROMBIN TIME: CPT | Performed by: PATHOLOGY

## 2022-01-04 PROCEDURE — 80053 COMPREHEN METABOLIC PANEL: CPT | Performed by: PATHOLOGY

## 2022-01-04 PROCEDURE — 82248 BILIRUBIN DIRECT: CPT | Performed by: PATHOLOGY

## 2022-01-04 PROCEDURE — 36415 COLL VENOUS BLD VENIPUNCTURE: CPT | Performed by: PATHOLOGY

## 2022-01-04 PROCEDURE — 99213 OFFICE O/P EST LOW 20 MIN: CPT | Mod: 25 | Performed by: INTERNAL MEDICINE

## 2022-01-04 PROCEDURE — 85027 COMPLETE CBC AUTOMATED: CPT | Performed by: PATHOLOGY

## 2022-01-04 ASSESSMENT — MIFFLIN-ST. JEOR: SCORE: 1503.72

## 2022-01-04 ASSESSMENT — PAIN SCALES - GENERAL: PAINLEVEL: NO PAIN (0)

## 2022-01-04 NOTE — PROGRESS NOTES
[facility] Hepatology    Follow-up Visit    CHIEF COMPLAINT AND REASON FOR VISIT:  Decompensated alcoholic liver disease.    CONSULTING HEALTHCARE PROVIDER:  Luda Caraballo MD.    SUBJECTIVE:  Mr. Herrera is a 36-year-old male with history of alcohol abuse disorder and we saw him initially for alcoholic hepatitis/alcoholic cirrhosis.  He did have problems with his liver disease decompensating.  He had edema and ascites.  He also had some minimal hepatic encephalopathy.  Mr. Herrera when he initially came had also acute kidney injury, which has improved.  He did do dialysis in 08/2020, although that has since discontinued.  Mr. Herrera had on 08/27/2021 TIPS procedure for his fluid retention.    This has since improved.  He also had problems with TIPS dysfunction and he had that revised on 11/24/2021.  Since then, he is doing quite well, is requiring less paracentesis.     Mr. Herrera also tells me now that he has some memory issues and naps during the day, but does not appear to be confused.  He denies any gastrointestinal bleeding like melena, hematemesis or hematochezia and his weight fluctuates in the same neighborhood of 130-135.  He otherwise denies any abdominal pain, nausea, vomiting.  He is moving his bowels twice a day with no blood in it and as far as the COVID vaccination is concerned, he did do 2 doses and going to schedule the third one.    Medical hx Surgical hx   Past Medical History:   Diagnosis Date     Alcoholic cirrhosis of liver with ascites (H) 03/10/2020     Ascites      CKD (chronic kidney disease) stage 3, GFR 30-59 ml/min (H)      Essential (primary) hypertension 09/17/2020     GERD (gastroesophageal reflux disease)      HE (hepatic encephalopathy) (H)      History of blood transfusion      History of ESRD requiring dialysis 12/2019    Resolved     Restless legs syndrome (RLS)       Past Surgical History:   Procedure Laterality Date     COLONOSCOPY      [facility] 2020      DENTAL SURGERY  2014    wisdom teeth     GI SURGERY       IR PARACENTESIS  8/27/2021     IR TRANSVEN INTRAHEPATIC PORTOSYST REV  11/24/2021     IR TRANSVEN INTRAHEPATIC PORTOSYST SHUNT  8/27/2021     US PARACENTESIS WITH ALBUMIN  04/02/2020     US PARACENTESIS WITH ALBUMIN  04/21/2020     US PARACENTESIS WITH ALBUMIN  06/04/2020     US PARACENTESIS WITH ALBUMIN  06/19/2020     US PARACENTESIS WITH ALBUMIN  07/29/2020     US PARACENTESIS WITH ALBUMIN  10/05/2020     US PARACENTESIS WITH ALBUMIN  11/03/2020     US PARACENTESIS WITH ALBUMIN  11/11/2020     US PARACENTESIS WITH ALBUMIN  11/24/2020     US PARACENTESIS WITH ALBUMIN  12/03/2020     US PARACENTESIS WITH ALBUMIN  12/11/2020     US PARACENTESIS WITH ALBUMIN  12/18/2020     US PARACENTESIS WITH ALBUMIN  12/30/2020     US PARACENTESIS WITH ALBUMIN  01/07/2021     US PARACENTESIS WITH ALBUMIN  01/18/2021     US PARACENTESIS WITH ALBUMIN  01/27/2021     US PARACENTESIS WITH ALBUMIN  02/05/2021     US PARACENTESIS WITH ALBUMIN  02/16/2021     US PARACENTESIS WITH ALBUMIN  02/26/2021     US PARACENTESIS WITH ALBUMIN  03/12/2021     US PARACENTESIS WITH ALBUMIN  03/22/2021     US PARACENTESIS WITH ALBUMIN  04/01/2021     US PARACENTESIS WITH ALBUMIN  04/12/2021     US PARACENTESIS WITH ALBUMIN  04/23/2021     US PARACENTESIS WITH ALBUMIN  05/04/2021     US PARACENTESIS WITH ALBUMIN  05/14/2021     US PARACENTESIS WITH ALBUMIN  05/24/2021     US PARACENTESIS WITH ALBUMIN  06/03/2021     US PARACENTESIS WITH ALBUMIN  06/14/2021     US PARACENTESIS WITH ALBUMIN  06/25/2021     US PARACENTESIS WITH ALBUMIN  07/05/2021          Medications  Prior to Admission medications    Medication Sig Start Date End Date Taking? Authorizing Provider   furosemide (LASIX) 20 MG tablet Take 0.5 tablets (10 mg) by mouth daily 10/20/21  Yes Brandin Echavarria MD   gabapentin (NEURONTIN) 100 MG capsule Take 100 mg by mouth nightly as needed  8/11/20  Yes Reported, Patient  "  guaiFENesin (MUCINEX) 600 MG 12 hr tablet Take 600 mg by mouth 2 times daily as needed for congestion or cough  2/15/20  Yes Reported, Patient   lactulose (CHRONULAC) 10 GM/15ML solution Take 10 g by mouth daily as needed  2/15/20  Yes Reported, Patient   Menthol-Methyl Salicylate (ICY HOT EXTRA STRENGTH) 10-30 % CREA Apply topically daily as needed    Yes Reported, Patient   midodrine (PROAMATINE) 5 MG tablet Take 2.5 mg by mouth 3 times daily  2/15/20  Yes Reported, Patient   Multiple Vitamins-Minerals (SUPER THERA PRERNA M) TABS Take 1 tablet by mouth every morning  12/29/19  Yes Reported, Patient   ondansetron (ZOFRAN) 8 MG tablet Take 0.5 tablets (4 mg) by mouth every 8 hours as needed for nausea 8/27/21  Yes Av Waldron MD   Psyllium 58.12 % PACK Take by mouth daily as needed    Yes Reported, Patient   rifaximin (XIFAXAN) 550 MG TABS tablet Take 1 tablet (550 mg) by mouth 2 times daily  Patient taking differently: Take 550 mg by mouth every morning  5/24/21  Yes Barndin Echavarria MD   spironolactone (ALDACTONE) 25 MG tablet Take 1 tablet (25 mg) by mouth daily 10/20/21  Yes Brandin Echavarria MD       Allergies  Allergies   Allergen Reactions     Blood Transfusion Related (Informational Only) Other (See Comments)     Patient has a history of a clinically significant antibody against RBC antigens.  A delay in compatible RBCs may occur.        Review of systems  A 10-point review of systems was negative    Examination  /68   Pulse 83   Ht 1.702 m (5' 7\")   Wt 61 kg (134 lb 8 oz)   SpO2 98%   BMI 21.07 kg/m    Body mass index is 21.07 kg/m .    Gen- well, NAD, A+Ox3, normal color  Lym- no palpable LAD  CVS- RRR  RS- CTA  Abd- Not distended. Question of shifting dullness.  Extr- hands normal, no LARISSA  Skin- no rash or jaundice Stretch marks.  Neuro- no asterixis  Psych- normal mood    Laboratory  Lab Results   Component Value Date     01/04/2022     01/12/2021    " POTASSIUM 4.2 01/04/2022    POTASSIUM 5.4 01/12/2021    CHLORIDE 112 01/04/2022    CHLORIDE 108 01/12/2021    CO2 22 01/04/2022    CO2 23 01/12/2021    BUN 24 01/04/2022    BUN 48 01/12/2021    CR 1.07 01/04/2022    CR 1.39 01/12/2021       Lab Results   Component Value Date    BILITOTAL 1.7 01/04/2022    BILITOTAL 1.2 01/12/2021    ALT 40 01/04/2022    ALT 32 01/12/2021    AST 45 01/04/2022    AST 43 01/12/2021    ALKPHOS 139 01/04/2022    ALKPHOS 128 01/12/2021       Lab Results   Component Value Date    ALBUMIN 3.5 01/04/2022    ALBUMIN 3.3 01/12/2021    PROTTOTAL 6.4 01/04/2022    PROTTOTAL 6.1 01/12/2021        Lab Results   Component Value Date    WBC 4.1 01/04/2022    WBC 4.9 01/12/2021    HGB 14.2 01/04/2022    HGB 13.4 01/12/2021    MCV 95 01/04/2022    MCV 95 01/12/2021     01/04/2022     01/12/2021       Lab Results   Component Value Date    INR 1.17 01/04/2022    INR 1.2 07/20/2021    INR 1.31 01/12/2021     MELD-Na score: 11 at 1/4/2022 10:47 AM  MELD score: 11 at 1/4/2022 10:47 AM  Calculated from:  Serum Creatinine: 1.07 mg/dL at 1/4/2022 10:47 AM  Serum Sodium: 142 mmol/L (Using max of 137 mmol/L) at 1/4/2022 10:47 AM  Total Bilirubin: 1.7 mg/dL at 1/4/2022 10:47 AM  INR(ratio): 1.17 at 1/4/2022 10:47 AM  Age: 36 years    Radiology    ASSESSMENT AND PLAN:  Alcoholic liver disease.  Mr. Herrera had alcoholic liver disease leading to cirrhosis.  He had problems with fluid retention.  Since his MELD score was low, he did go for evaluation for TIPS procedure and this was done.  He also had revision of the TIPS as Doppler ultrasound showed some dysfunction. Since his revision, he states he is requiring less paracenteses and less fluid is taken out.  He does not show any signs of hepatic encephalopathy and he is very compliant with his medication, rifaximin and lactulose.  Otherwise, we will continue doing surveillance for HCC.  He will continue his quest for living donor liver  transplantation, and he will be seen here in 3 months.  For all his healthcare maintenance issues, he will follow with his primary care physician.    I spent 25 minutes on the date of the encounter doing chart review, history and exam, documentation and further activities such as counseling and coordination of care.    Brandin Echavarria MD  Hepatology  [facility]

## 2022-01-04 NOTE — NURSING NOTE
"Chief Complaint   Patient presents with     RECHECK     f/u     /68   Pulse 83   Ht 1.702 m (5' 7\")   Wt 61 kg (134 lb 8 oz)   SpO2 98%   BMI 21.07 kg/m      Kev Ghotra MA  "

## 2022-01-04 NOTE — LETTER
1/4/2022         RE: Christo Herrera  [address] [place] [address]      [facility] Hepatology    Follow-up Visit    CHIEF COMPLAINT AND REASON FOR VISIT:  Decompensated alcoholic liver disease.    CONSULTING HEALTHCARE PROVIDER:  Luda Caraballo MD.    SUBJECTIVE:  Mr. Herrera is a 36-year-old male with history of alcohol abuse disorder and we saw him initially for alcoholic hepatitis/alcoholic cirrhosis.  He did have problems with his liver disease decompensating.  He had edema and ascites.  He also had some minimal hepatic encephalopathy.  Mr. Herrera when he initially came had also acute kidney injury, which has improved.  He did do dialysis in 08/2020, although that has since discontinued.  Mr. Herrera had on 08/27/2021 TIPS procedure for his fluid retention.    This has since improved.  He also had problems with TIPS dysfunction and he had that revised on 11/24/2021.  Since then, he is doing quite well, is requiring less paracentesis.     Mr. Herrera also tells me now that he has some memory issues and naps during the day, but does not appear to be confused.  He denies any gastrointestinal bleeding like melena, hematemesis or hematochezia and his weight fluctuates in the same neighborhood of 130-135.  He otherwise denies any abdominal pain, nausea, vomiting.  He is moving his bowels twice a day with no blood in it and as far as the COVID vaccination is concerned, he did do 2 doses and going to schedule the third one.    Medical hx Surgical hx   Past Medical History:   Diagnosis Date     Alcoholic cirrhosis of liver with ascites (H) 03/10/2020     Ascites      CKD (chronic kidney disease) stage 3, GFR 30-59 ml/min (H)      Essential (primary) hypertension 09/17/2020     GERD (gastroesophageal reflux disease)      HE (hepatic encephalopathy) (H)      History of blood transfusion      History of ESRD requiring dialysis 12/2019    Resolved     Restless legs syndrome (RLS)       Past  Surgical History:   Procedure Laterality Date     COLONOSCOPY      [facility] 2020     DENTAL SURGERY  2014    wisdom teeth     GI SURGERY       IR PARACENTESIS  8/27/2021     IR TRANSVEN INTRAHEPATIC PORTOSYST REV  11/24/2021     IR TRANSVEN INTRAHEPATIC PORTOSYST SHUNT  8/27/2021     US PARACENTESIS WITH ALBUMIN  04/02/2020     US PARACENTESIS WITH ALBUMIN  04/21/2020     US PARACENTESIS WITH ALBUMIN  06/04/2020     US PARACENTESIS WITH ALBUMIN  06/19/2020     US PARACENTESIS WITH ALBUMIN  07/29/2020     US PARACENTESIS WITH ALBUMIN  10/05/2020     US PARACENTESIS WITH ALBUMIN  11/03/2020     US PARACENTESIS WITH ALBUMIN  11/11/2020     US PARACENTESIS WITH ALBUMIN  11/24/2020     US PARACENTESIS WITH ALBUMIN  12/03/2020     US PARACENTESIS WITH ALBUMIN  12/11/2020     US PARACENTESIS WITH ALBUMIN  12/18/2020     US PARACENTESIS WITH ALBUMIN  12/30/2020     US PARACENTESIS WITH ALBUMIN  01/07/2021     US PARACENTESIS WITH ALBUMIN  01/18/2021     US PARACENTESIS WITH ALBUMIN  01/27/2021     US PARACENTESIS WITH ALBUMIN  02/05/2021     US PARACENTESIS WITH ALBUMIN  02/16/2021     US PARACENTESIS WITH ALBUMIN  02/26/2021     US PARACENTESIS WITH ALBUMIN  03/12/2021     US PARACENTESIS WITH ALBUMIN  03/22/2021     US PARACENTESIS WITH ALBUMIN  04/01/2021     US PARACENTESIS WITH ALBUMIN  04/12/2021     US PARACENTESIS WITH ALBUMIN  04/23/2021     US PARACENTESIS WITH ALBUMIN  05/04/2021     US PARACENTESIS WITH ALBUMIN  05/14/2021     US PARACENTESIS WITH ALBUMIN  05/24/2021     US PARACENTESIS WITH ALBUMIN  06/03/2021     US PARACENTESIS WITH ALBUMIN  06/14/2021     US PARACENTESIS WITH ALBUMIN  06/25/2021     US PARACENTESIS WITH ALBUMIN  07/05/2021          Medications  Prior to Admission medications    Medication Sig Start Date End Date Taking? Authorizing Provider   furosemide (LASIX) 20 MG tablet Take 0.5 tablets (10 mg) by mouth daily 10/20/21  Yes Brandin Echavarria MD   gabapentin (NEURONTIN) 100  "MG capsule Take 100 mg by mouth nightly as needed  8/11/20  Yes Reported, Patient   guaiFENesin (MUCINEX) 600 MG 12 hr tablet Take 600 mg by mouth 2 times daily as needed for congestion or cough  2/15/20  Yes Reported, Patient   lactulose (CHRONULAC) 10 GM/15ML solution Take 10 g by mouth daily as needed  2/15/20  Yes Reported, Patient   Menthol-Methyl Salicylate (ICY HOT EXTRA STRENGTH) 10-30 % CREA Apply topically daily as needed    Yes Reported, Patient   midodrine (PROAMATINE) 5 MG tablet Take 2.5 mg by mouth 3 times daily  2/15/20  Yes Reported, Patient   Multiple Vitamins-Minerals (SUPER THERA PRERNA M) TABS Take 1 tablet by mouth every morning  12/29/19  Yes Reported, Patient   ondansetron (ZOFRAN) 8 MG tablet Take 0.5 tablets (4 mg) by mouth every 8 hours as needed for nausea 8/27/21  Yes Av Waldron MD   Psyllium 58.12 % PACK Take by mouth daily as needed    Yes Reported, Patient   rifaximin (XIFAXAN) 550 MG TABS tablet Take 1 tablet (550 mg) by mouth 2 times daily  Patient taking differently: Take 550 mg by mouth every morning  5/24/21  Yes Brandin Echavarria MD   spironolactone (ALDACTONE) 25 MG tablet Take 1 tablet (25 mg) by mouth daily 10/20/21  Yes Brandin Echavarria MD       Allergies  Allergies   Allergen Reactions     Blood Transfusion Related (Informational Only) Other (See Comments)     Patient has a history of a clinically significant antibody against RBC antigens.  A delay in compatible RBCs may occur.        Review of systems  A 10-point review of systems was negative    Examination  /68   Pulse 83   Ht 1.702 m (5' 7\")   Wt 61 kg (134 lb 8 oz)   SpO2 98%   BMI 21.07 kg/m    Body mass index is 21.07 kg/m .    Gen- well, NAD, A+Ox3, normal color  Lym- no palpable LAD  CVS- RRR  RS- CTA  Abd- Not distended. Question of shifting dullness.  Extr- hands normal, no LARISSA  Skin- no rash or jaundice Stretch marks.  Neuro- no asterixis  Psych- normal " mood    Laboratory  Lab Results   Component Value Date     01/04/2022     01/12/2021    POTASSIUM 4.2 01/04/2022    POTASSIUM 5.4 01/12/2021    CHLORIDE 112 01/04/2022    CHLORIDE 108 01/12/2021    CO2 22 01/04/2022    CO2 23 01/12/2021    BUN 24 01/04/2022    BUN 48 01/12/2021    CR 1.07 01/04/2022    CR 1.39 01/12/2021       Lab Results   Component Value Date    BILITOTAL 1.7 01/04/2022    BILITOTAL 1.2 01/12/2021    ALT 40 01/04/2022    ALT 32 01/12/2021    AST 45 01/04/2022    AST 43 01/12/2021    ALKPHOS 139 01/04/2022    ALKPHOS 128 01/12/2021       Lab Results   Component Value Date    ALBUMIN 3.5 01/04/2022    ALBUMIN 3.3 01/12/2021    PROTTOTAL 6.4 01/04/2022    PROTTOTAL 6.1 01/12/2021        Lab Results   Component Value Date    WBC 4.1 01/04/2022    WBC 4.9 01/12/2021    HGB 14.2 01/04/2022    HGB 13.4 01/12/2021    MCV 95 01/04/2022    MCV 95 01/12/2021     01/04/2022     01/12/2021       Lab Results   Component Value Date    INR 1.17 01/04/2022    INR 1.2 07/20/2021    INR 1.31 01/12/2021     MELD-Na score: 11 at 1/4/2022 10:47 AM  MELD score: 11 at 1/4/2022 10:47 AM  Calculated from:  Serum Creatinine: 1.07 mg/dL at 1/4/2022 10:47 AM  Serum Sodium: 142 mmol/L (Using max of 137 mmol/L) at 1/4/2022 10:47 AM  Total Bilirubin: 1.7 mg/dL at 1/4/2022 10:47 AM  INR(ratio): 1.17 at 1/4/2022 10:47 AM  Age: 36 years    Radiology    ASSESSMENT AND PLAN:  Alcoholic liver disease.  Mr. Herrera had alcoholic liver disease leading to cirrhosis.  He had problems with fluid retention.  Since his MELD score was low, he did go for evaluation for TIPS procedure and this was done.  He also had revision of the TIPS as Doppler ultrasound showed some dysfunction. Since his revision, he states he is requiring less paracenteses and less fluid is taken out.  He does not show any signs of hepatic encephalopathy and he is very compliant with his medication, rifaximin and lactulose.  Otherwise, we will  continue doing surveillance for HCC.  He will continue his quest for living donor liver transplantation, and he will be seen here in 3 months.  For all his healthcare maintenance issues, he will follow with his primary care physician.    I spent 25 minutes on the date of the encounter doing chart review, history and exam, documentation and further activities such as counseling and coordination of care.    Brandin Echavarria MD  Hepatology  [facility]        Brandin Echavarria MD

## 2022-01-04 NOTE — LETTER
1/4/2022     RE: Christo Herrera  [address] [place] [address]    Dear Colleague,    Thank you for referring your patient, Christo Herrera, to the [facility] [place]. Please see a copy of my visit note below.    [facility] Hepatology    Follow-up Visit    CHIEF COMPLAINT AND REASON FOR VISIT:  Decompensated alcoholic liver disease.    CONSULTING HEALTHCARE PROVIDER:  Luda Caraballo MD.    SUBJECTIVE:  Mr. Herrera is a 36-year-old male with history of alcohol abuse disorder and we saw him initially for alcoholic hepatitis/alcoholic cirrhosis.  He did have problems with his liver disease decompensating.  He had edema and ascites.  He also had some minimal hepatic encephalopathy.  Mr. Herrera when he initially came had also acute kidney injury, which has improved.  He did do dialysis in 08/2020, although that has since discontinued.  Mr. Herrera had on 08/27/2021 TIPS procedure for his fluid retention.    This has since improved.  He also had problems with TIPS dysfunction and he had that revised on 11/24/2021.  Since then, he is doing quite well, is requiring less paracentesis.     Mr. Herrera also tells me now that he has some memory issues and naps during the day, but does not appear to be confused.  He denies any gastrointestinal bleeding like melena, hematemesis or hematochezia and his weight fluctuates in the same neighborhood of 130-135.  He otherwise denies any abdominal pain, nausea, vomiting.  He is moving his bowels twice a day with no blood in it and as far as the COVID vaccination is concerned, he did do 2 doses and going to schedule the third one.    Medical hx Surgical hx   Past Medical History:   Diagnosis Date     Alcoholic cirrhosis of liver with ascites (H) 03/10/2020     Ascites      CKD (chronic kidney disease) stage 3, GFR 30-59 ml/min (H)      Essential (primary) hypertension 09/17/2020     GERD (gastroesophageal reflux disease)       HE (hepatic encephalopathy) (H)      History of blood transfusion      History of ESRD requiring dialysis 12/2019    Resolved     Restless legs syndrome (RLS)       Past Surgical History:   Procedure Laterality Date     COLONOSCOPY      [facility] 2020     DENTAL SURGERY  2014    wisdom teeth     GI SURGERY       IR PARACENTESIS  8/27/2021     IR TRANSVEN INTRAHEPATIC PORTOSYST REV  11/24/2021     IR TRANSVEN INTRAHEPATIC PORTOSYST SHUNT  8/27/2021     US PARACENTESIS WITH ALBUMIN  04/02/2020     US PARACENTESIS WITH ALBUMIN  04/21/2020     US PARACENTESIS WITH ALBUMIN  06/04/2020     US PARACENTESIS WITH ALBUMIN  06/19/2020     US PARACENTESIS WITH ALBUMIN  07/29/2020     US PARACENTESIS WITH ALBUMIN  10/05/2020     US PARACENTESIS WITH ALBUMIN  11/03/2020     US PARACENTESIS WITH ALBUMIN  11/11/2020     US PARACENTESIS WITH ALBUMIN  11/24/2020     US PARACENTESIS WITH ALBUMIN  12/03/2020     US PARACENTESIS WITH ALBUMIN  12/11/2020     US PARACENTESIS WITH ALBUMIN  12/18/2020     US PARACENTESIS WITH ALBUMIN  12/30/2020     US PARACENTESIS WITH ALBUMIN  01/07/2021     US PARACENTESIS WITH ALBUMIN  01/18/2021     US PARACENTESIS WITH ALBUMIN  01/27/2021     US PARACENTESIS WITH ALBUMIN  02/05/2021     US PARACENTESIS WITH ALBUMIN  02/16/2021     US PARACENTESIS WITH ALBUMIN  02/26/2021     US PARACENTESIS WITH ALBUMIN  03/12/2021     US PARACENTESIS WITH ALBUMIN  03/22/2021     US PARACENTESIS WITH ALBUMIN  04/01/2021     US PARACENTESIS WITH ALBUMIN  04/12/2021     US PARACENTESIS WITH ALBUMIN  04/23/2021     US PARACENTESIS WITH ALBUMIN  05/04/2021     US PARACENTESIS WITH ALBUMIN  05/14/2021     US PARACENTESIS WITH ALBUMIN  05/24/2021     US PARACENTESIS WITH ALBUMIN  06/03/2021     US PARACENTESIS WITH ALBUMIN  06/14/2021     US PARACENTESIS WITH ALBUMIN  06/25/2021     US PARACENTESIS WITH ALBUMIN  07/05/2021          Medications  Prior to Admission medications    Medication Sig Start Date End Date Taking?  "Authorizing Provider   furosemide (LASIX) 20 MG tablet Take 0.5 tablets (10 mg) by mouth daily 10/20/21  Yes Brandin Echavarria MD   gabapentin (NEURONTIN) 100 MG capsule Take 100 mg by mouth nightly as needed  8/11/20  Yes Reported, Patient   guaiFENesin (MUCINEX) 600 MG 12 hr tablet Take 600 mg by mouth 2 times daily as needed for congestion or cough  2/15/20  Yes Reported, Patient   lactulose (CHRONULAC) 10 GM/15ML solution Take 10 g by mouth daily as needed  2/15/20  Yes Reported, Patient   Menthol-Methyl Salicylate (ICY HOT EXTRA STRENGTH) 10-30 % CREA Apply topically daily as needed    Yes Reported, Patient   midodrine (PROAMATINE) 5 MG tablet Take 2.5 mg by mouth 3 times daily  2/15/20  Yes Reported, Patient   Multiple Vitamins-Minerals (SUPER THERA PRERNA M) TABS Take 1 tablet by mouth every morning  12/29/19  Yes Reported, Patient   ondansetron (ZOFRAN) 8 MG tablet Take 0.5 tablets (4 mg) by mouth every 8 hours as needed for nausea 8/27/21  Yes Av Waldron MD   Psyllium 58.12 % PACK Take by mouth daily as needed    Yes Reported, Patient   rifaximin (XIFAXAN) 550 MG TABS tablet Take 1 tablet (550 mg) by mouth 2 times daily  Patient taking differently: Take 550 mg by mouth every morning  5/24/21  Yes Brandin Echavarria MD   spironolactone (ALDACTONE) 25 MG tablet Take 1 tablet (25 mg) by mouth daily 10/20/21  Yes Brandin Echavarria MD       Allergies  Allergies   Allergen Reactions     Blood Transfusion Related (Informational Only) Other (See Comments)     Patient has a history of a clinically significant antibody against RBC antigens.  A delay in compatible RBCs may occur.        Review of systems  A 10-point review of systems was negative    Examination  /68   Pulse 83   Ht 1.702 m (5' 7\")   Wt 61 kg (134 lb 8 oz)   SpO2 98%   BMI 21.07 kg/m    Body mass index is 21.07 kg/m .    Gen- well, NAD, A+Ox3, normal color  Lym- no palpable LAD  CVS- RRR  RS- CTA  Abd- " Not distended. Question of shifting dullness.  Extr- hands normal, no LARISSA  Skin- no rash or jaundice Stretch marks.  Neuro- no asterixis  Psych- normal mood    Laboratory  Lab Results   Component Value Date     01/04/2022     01/12/2021    POTASSIUM 4.2 01/04/2022    POTASSIUM 5.4 01/12/2021    CHLORIDE 112 01/04/2022    CHLORIDE 108 01/12/2021    CO2 22 01/04/2022    CO2 23 01/12/2021    BUN 24 01/04/2022    BUN 48 01/12/2021    CR 1.07 01/04/2022    CR 1.39 01/12/2021       Lab Results   Component Value Date    BILITOTAL 1.7 01/04/2022    BILITOTAL 1.2 01/12/2021    ALT 40 01/04/2022    ALT 32 01/12/2021    AST 45 01/04/2022    AST 43 01/12/2021    ALKPHOS 139 01/04/2022    ALKPHOS 128 01/12/2021       Lab Results   Component Value Date    ALBUMIN 3.5 01/04/2022    ALBUMIN 3.3 01/12/2021    PROTTOTAL 6.4 01/04/2022    PROTTOTAL 6.1 01/12/2021        Lab Results   Component Value Date    WBC 4.1 01/04/2022    WBC 4.9 01/12/2021    HGB 14.2 01/04/2022    HGB 13.4 01/12/2021    MCV 95 01/04/2022    MCV 95 01/12/2021     01/04/2022     01/12/2021       Lab Results   Component Value Date    INR 1.17 01/04/2022    INR 1.2 07/20/2021    INR 1.31 01/12/2021     MELD-Na score: 11 at 1/4/2022 10:47 AM  MELD score: 11 at 1/4/2022 10:47 AM  Calculated from:  Serum Creatinine: 1.07 mg/dL at 1/4/2022 10:47 AM  Serum Sodium: 142 mmol/L (Using max of 137 mmol/L) at 1/4/2022 10:47 AM  Total Bilirubin: 1.7 mg/dL at 1/4/2022 10:47 AM  INR(ratio): 1.17 at 1/4/2022 10:47 AM  Age: 36 years    Radiology    ASSESSMENT AND PLAN:  Alcoholic liver disease.  Mr. Herrera had alcoholic liver disease leading to cirrhosis.  He had problems with fluid retention.  Since his MELD score was low, he did go for evaluation for TIPS procedure and this was done.  He also had revision of the TIPS as Doppler ultrasound showed some dysfunction. Since his revision, he states he is requiring less paracenteses and less fluid is  taken out.  He does not show any signs of hepatic encephalopathy and he is very compliant with his medication, rifaximin and lactulose.  Otherwise, we will continue doing surveillance for HCC.  He will continue his quest for living donor liver transplantation, and he will be seen here in 3 months.  For all his healthcare maintenance issues, he will follow with his primary care physician.    I spent 25 minutes on the date of the encounter doing chart review, history and exam, documentation and further activities such as counseling and coordination of care.    Brandin Echavarria MD  Hepatology  [facility]

## 2022-01-05 ENCOUNTER — VIRTUAL VISIT (OUTPATIENT)
Dept: DERMATOLOGY | Facility: CLINIC | Age: 37
End: 2022-01-05
Attending: RADIOLOGY
Payer: COMMERCIAL

## 2022-01-05 DIAGNOSIS — Z95.828 S/P TIPS (TRANSJUGULAR INTRAHEPATIC PORTOSYSTEMIC SHUNT): ICD-10-CM

## 2022-01-05 DIAGNOSIS — K70.31 ALCOHOLIC CIRRHOSIS OF LIVER WITH ASCITES (H): Primary | ICD-10-CM

## 2022-01-05 PROCEDURE — G0463 HOSPITAL OUTPT CLINIC VISIT: HCPCS | Mod: PN,RTG | Performed by: RADIOLOGY

## 2022-01-05 PROCEDURE — 99213 OFFICE O/P EST LOW 20 MIN: CPT | Mod: 95 | Performed by: RADIOLOGY

## 2022-01-05 NOTE — LETTER
1/5/2022      RE: Christo Herrera  [address] [place] [address]           INTERVENTIONAL RADIOLOGY CONSULTATION    Name: Christo Herrera  Age: 36 year old   Referring Physician: Dr. Echavarria  REASON FOR REFERRAL: portal      HPI: Doing well since med changes and TIPS revision. Energy and ascites are improved. He has some mild memory issues, but no overt encephalopathy. He is taking lactulose and rifaximin. No jaundice, leg swelling, dyspnea, melena/heamtochezia, hematemesis.  He underwent TIPS revision on 11/24/2021.  He has subsequently required for fewer paracenteses, with more recently low volume ascites.  His most recent fluid drainage was 7 days ago, which yielded 1.4 L.    PAST MEDICAL HISTORY:   Past Medical History:   Diagnosis Date     Alcoholic cirrhosis of liver with ascites (H) 03/10/2020     Ascites      CKD (chronic kidney disease) stage 3, GFR 30-59 ml/min (H)      Essential (primary) hypertension 09/17/2020     GERD (gastroesophageal reflux disease)      HE (hepatic encephalopathy) (H)      History of blood transfusion      History of ESRD requiring dialysis 12/2019    Resolved     Restless legs syndrome (RLS)        PAST SURGICAL HISTORY:       PROBLEM LIST:     ALLERGIES:   Blood transfusion related (informational only)    ROS:  As above    Physical Examination:   VITALS:   There were no vitals taken for this visit.  GENERAL: Healthy, alert and no distress  SKIN: Visible skin clear. No significant rash, abnormal pigmentation or lesions.  PSYCH: Mentation appears normal, affect normal/bright, judgement and insight intact, normal speech and appearance well-groomed.  EYES: Eyes grossly normal to inspection. No discharge or erythema, or obvious scleral/conjunctival abnormalities.  RESP: No audible wheeze, cough, or visible cyanosis. No visible retractions or increased work of breathing.   NEURO: Cranial nerves grossly intact. Mentation and speech appropriate for age.      INR/PTT:  Lab  Results   Component Value Date    INR 1.17 (H) 01/04/2022    INR 1.2 07/20/2021    INR 1.31 (H) 01/12/2021    PTT 32 11/24/2021       Diagnostic studies:   Imaging reviewed by me.  TIPS ultrasound 12/21/2021 shows antegrade flow in the main portal vein, and retrograde flow in the left and right portal veins.  Mildly elevated velocities at the mid and hepatic venous end of the stent are reported.  Small volume ascites is present.    Radiologist interpretation:  IMPRESSION:   1. Mildly elevated velocities in the mid TIPS and hepatic venous end  of TIPS, but with appropriate retrograde flow in the intrahepatic  portal veins and rapid antegrade flow in the main portal vein. This  makes significant stenosis unlikely, . Ongoing ultrasound surveillance  is recommended.  2. Small amount of ascites.     I have personally reviewed the examination and initial interpretation  and I agree with the findings.     JENNY MECREDES MD     Assessment: 36-year-old male with alcohol induced cirrhosis (Na MELD 17, Child Jo B, ECOG 0) and evidence of portal hypertension, with history of varices and remote GI bleeding, as well as large volume refractory ascites that is lifestyle limiting.   Also  ascites initially reduced after TIPS, he did have significant volume ascites which led to TIPS revision and medication alteration.  Following these changes, he feels his ascites is significantly improved.  The volume is much smaller, and he has required significantly fewer paracenteses.  He is very happy with the result.  TIPS ultrasound shows no suspicion of dysfunction and small volume ascites.  He continues to abstain from alcohol.  He has been assessed for possible liver transplant her at the  (see committee note dated 10/5/2021).     Plan:  1. TIPS ultrasound in 3 months.  2. Continue diet and medication management of ascites under the direction of Dr. Echavarria.     It was a pleasure to conduct this video visit with Mr. Herrera today.  Thank  you for involving the interventional radiology service in his care.     I spent a total of 12 minutes on today's video visit, over 50% time was for counseling and care coordination.  In addition I spent 5 minutes reviewing imaging and 10 minutes completing documentation.     Malu Pizarro MD  Interventional Radiology           Pager [phone]     Review of the result(s) of each unique test - TIPS ultrasound    Video-Visit Details     Type of service:  Video Visit     Video Start and End Time:start 1:49 PM, end 2:01 PM    Originating Location (pt. Location): Home     Distant Location (provider location):  [facility] VASCULAR [facility]      Platform used for Video Visit: [email or web address]  Patient Care Team:  Annie Lara MD as PCP - Christo Livingston Jr. RN as Nurse Coordinator (Transplant)  Luda Caraballo MD as Referring Physician (Gastroenterology)  Brandin Echavarria MD as Assigned Gastroenterology Provider  Aide Martínez PA-C as Physician Assistant (Pediatric Critical Care Medicine)  Carlos Enrique Rodríguez MD as Assigned Surgical Provider  Anoop Renae as Assigned Behavioral Health Provider  Malu Pizarro MD as MD (Vascular and Interventional Radiology)  Elena Araiza, RN as Specialty Care Coordinator (Vascular and Interventional Radiology)  Jada Velasquez MD as Assigned Heart and Vascular Provider  ANNIE LARA

## 2022-01-05 NOTE — PROGRESS NOTES
INTERVENTIONAL RADIOLOGY CONSULTATION    Name: Christo Herrera  Age: 36 year old   Referring Physician: Dr. Echavarria  REASON FOR REFERRAL: portal      HPI: Doing well since med changes and TIPS revision. Energy and ascites are improved. He has some mild memory issues, but no overt encephalopathy. He is taking lactulose and rifaximin. No jaundice, leg swelling, dyspnea, melena/heamtochezia, hematemesis.  He underwent TIPS revision on 11/24/2021.  He has subsequently required for fewer paracenteses, with more recently low volume ascites.  His most recent fluid drainage was 7 days ago, which yielded 1.4 L.    PAST MEDICAL HISTORY:   Past Medical History:   Diagnosis Date     Alcoholic cirrhosis of liver with ascites (H) 03/10/2020     Ascites      CKD (chronic kidney disease) stage 3, GFR 30-59 ml/min (H)      Essential (primary) hypertension 09/17/2020     GERD (gastroesophageal reflux disease)      HE (hepatic encephalopathy) (H)      History of blood transfusion      History of ESRD requiring dialysis 12/2019    Resolved     Restless legs syndrome (RLS)        PAST SURGICAL HISTORY:       PROBLEM LIST:     ALLERGIES:   Blood transfusion related (informational only)    ROS:  As above    Physical Examination:   VITALS:   There were no vitals taken for this visit.  GENERAL: Healthy, alert and no distress  SKIN: Visible skin clear. No significant rash, abnormal pigmentation or lesions.  PSYCH: Mentation appears normal, affect normal/bright, judgement and insight intact, normal speech and appearance well-groomed.  EYES: Eyes grossly normal to inspection. No discharge or erythema, or obvious scleral/conjunctival abnormalities.  RESP: No audible wheeze, cough, or visible cyanosis. No visible retractions or increased work of breathing.   NEURO: Cranial nerves grossly intact. Mentation and speech appropriate for age.      INR/PTT:  Lab Results   Component Value Date    INR 1.17 (H) 01/04/2022    INR 1.2 07/20/2021    INR  1.31 (H) 01/12/2021    PTT 32 11/24/2021       Diagnostic studies:   Imaging reviewed by me.  TIPS ultrasound 12/21/2021 shows antegrade flow in the main portal vein, and retrograde flow in the left and right portal veins.  Mildly elevated velocities at the mid and hepatic venous end of the stent are reported.  Small volume ascites is present.    Radiologist interpretation:  IMPRESSION:   1. Mildly elevated velocities in the mid TIPS and hepatic venous end  of TIPS, but with appropriate retrograde flow in the intrahepatic  portal veins and rapid antegrade flow in the main portal vein. This  makes significant stenosis unlikely, . Ongoing ultrasound surveillance  is recommended.  2. Small amount of ascites.     I have personally reviewed the examination and initial interpretation  and I agree with the findings.     JENNY MERCEDES MD     Assessment: 36-year-old male with alcohol induced cirrhosis (Na MELD 17, Child Jo B, ECOG 0) and evidence of portal hypertension, with history of varices and remote GI bleeding, as well as large volume refractory ascites that is lifestyle limiting.   Also  ascites initially reduced after TIPS, he did have significant volume ascites which led to TIPS revision and medication alteration.  Following these changes, he feels his ascites is significantly improved.  The volume is much smaller, and he has required significantly fewer paracenteses.  He is very happy with the result.  TIPS ultrasound shows no suspicion of dysfunction and small volume ascites.  He continues to abstain from alcohol.  He has been assessed for possible liver transplant her at the  (see committee note dated 10/5/2021).     Plan:  1. TIPS ultrasound in 3 months.  2. Continue diet and medication management of ascites under the direction of Dr. Echavarria.     It was a pleasure to conduct this video visit with  Javier today.  Thank you for involving the interventional radiology service in his care.     I spent a  total of 12 minutes on today's video visit, over 50% time was for counseling and care coordination.  In addition I spent 5 minutes reviewing imaging and 10 minutes completing documentation.     Malu Pizarro MD  Interventional Radiology           Pager [phone]     Review of the result(s) of each unique test - TIPS ultrasound    Video-Visit Details     Type of service:  Video Visit     Video Start and End Time:start 1:49 PM, end 2:01 PM    Originating Location (pt. Location): Home     Distant Location (provider location):  [facility] VASCULAR [facility]      Platform used for Video Visit: [employer] [address]  Patient Care Team:  Annie Lara MD as PCP - Christo Livingston Jr. RN as Nurse Coordinator (Transplant)  Luda Caraballo MD as Referring Physician (Gastroenterology)  Brandin Echavarria MD as Assigned Gastroenterology Provider  Aide Martínez PA-C as Physician Assistant (Pediatric Critical Care Medicine)  Carlos Enrique Rodríguez MD as Assigned Surgical Provider  Anoop Renae as Assigned Behavioral Health Provider  Malu Pizarro MD as MD (Vascular and Interventional Radiology)  Elena Araiza, RN as Specialty Care Coordinator (Vascular and Interventional Radiology)  Jada Velasquez MD as Assigned Heart and Vascular Provider  ANNIE LARA

## 2022-01-05 NOTE — PATIENT INSTRUCTIONS
Christo you have had your virtual follow up visit today with Dr Pizarro IR regarding your TIPS.    Your ultrasound completed  On 12/21/21 has been reviewed with you during this visit.    Plan:    Return in 2  Months for  TIPS  Ultrasound and Video visit with Dr Pizarro.  We will call you for your appointments.    CLIFTON Araiza RN, BSN  Interventional Radiology Nurse Coordinator   Phone:  [phone]

## 2022-01-05 NOTE — NURSING NOTE
How would you like to obtain your AVS? Gagandeep Herrera complains of    Chief Complaint   Patient presents with     RECHECK     Transjugular Intrahepatic Portosystemic Shunt       Patient would like the video invitation sent by: GAGANDEEP    Patient is located in [place]? Yes     I have reviewed and updated the patient's medication list, allergies and preferred pharmacy.      Lee Jones, CMA

## 2022-01-08 LAB — PETH BLD-MCNC: NEGATIVE NG/ML

## 2022-01-10 ENCOUNTER — HOSPITAL ENCOUNTER (OUTPATIENT)
Dept: ULTRASOUND IMAGING | Facility: CLINIC | Age: 37
Discharge: HOME | End: 2022-01-10
Attending: INTERNAL MEDICINE | Admitting: INTERNAL MEDICINE
Payer: COMMERCIAL

## 2022-01-10 DIAGNOSIS — R18.8 OTHER ASCITES: ICD-10-CM

## 2022-01-10 PROCEDURE — 76705 ECHO EXAM OF ABDOMEN: CPT

## 2022-01-30 ENCOUNTER — HEALTH MAINTENANCE LETTER (OUTPATIENT)
Age: 37
End: 2022-01-30

## 2022-02-18 ENCOUNTER — TELEPHONE (OUTPATIENT)
Dept: RADIOLOGY | Facility: CLINIC | Age: 37
End: 2022-02-18
Payer: COMMERCIAL

## 2022-02-18 NOTE — TELEPHONE ENCOUNTER
I called and left a voicemail including my call back number.  I called to see how Leidy is doing with his TIPS, and possibly review his TIPS ultrasound coming up and just call after Dr Pizarro reviews if he is doing well.  I sent a [employer] message.  CLIFTON Araiza RN, BSN  Interventional Radiology Nurse Coordinator   Phone:  [phone]

## 2022-02-23 NOTE — TELEPHONE ENCOUNTER
I spoke with Leidy. He is doing very well, not needing any para's and has cancelled his latest para appointment.  Plan is to call him after Dr Pizarro reviews his 2/28/22 TIPS ultrasound with the results and recommendations.  He agrees with the plan.  CLIFTON Araiza RN, BSN  Interventional Radiology Nurse Coordinator   Phone:  [phone]

## 2022-02-28 ENCOUNTER — ANCILLARY PROCEDURE (OUTPATIENT)
Dept: ULTRASOUND IMAGING | Facility: CLINIC | Age: 37
End: 2022-02-28
Attending: RADIOLOGY
Payer: COMMERCIAL

## 2022-02-28 DIAGNOSIS — Z95.828 S/P TIPS (TRANSJUGULAR INTRAHEPATIC PORTOSYSTEMIC SHUNT): ICD-10-CM

## 2022-02-28 DIAGNOSIS — K70.31 ALCOHOLIC CIRRHOSIS OF LIVER WITH ASCITES (H): ICD-10-CM

## 2022-02-28 PROCEDURE — 93975 VASCULAR STUDY: CPT | Performed by: RADIOLOGY

## 2022-03-02 DIAGNOSIS — Z95.828 S/P TIPS (TRANSJUGULAR INTRAHEPATIC PORTOSYSTEMIC SHUNT): Primary | ICD-10-CM

## 2022-03-02 NOTE — TELEPHONE ENCOUNTER
I called and spoke with Christo.  His TIPS ultrasound completed on 2/28/22 has been reviewed by Dr Pizarro.  It looks good, next plan repeat TIPS ultrasound in 3 months and review and call pt.  He states that his Para he has scheduled in March is because he wants to have if needed.  He will cancel if not needed, he doesn't feel like he has ascites.  He does ask if he can get more zofran as if he eats too much he gets nausea and the zofran helps.  We discussed this should be managed by his GI/hepatologist of PCP.  He confirms and will contact us with any questions or concerns.  CLIFTON Araiza, RN, BSN  Interventional Radiology Nurse Coordinator   Phone:  [phone]

## 2022-04-08 ENCOUNTER — TELEPHONE (OUTPATIENT)
Dept: GASTROENTEROLOGY | Facility: CLINIC | Age: 37
End: 2022-04-08
Payer: COMMERCIAL

## 2022-04-08 NOTE — TELEPHONE ENCOUNTER
Prior Authorization Retail Medication Request    Medication/Dose:   ICD code (if different than what is on RX):  K72.90 hepatic encephalpathy  Previously Tried and Failed:  Lactulose alone  Rationale:  Xifaxan helps to lower the toxin build up in the blood while lactulose works by cleansing the toxin build up in the liver. Adjunctive therapy.      Insurance Name:    Insurance ID:        Pharmacy Information (if different than what is on RX)  Name:    Phone:

## 2022-04-08 NOTE — TELEPHONE ENCOUNTER
Central Prior Authorization Team   Phone: [phone]      PA Initiation - I've submitted the P/A request to insurance as an URGENT request.    Medication: Xifaxan 550 mg tab  Insurance Company: [employer] - Phone [phone] Fax [phone]  [facility] the [id number] [address]  Filling Pharmacy Phone: [phone]  Filling Pharmacy Fax:    Start Date: 4/8/2022

## 2022-04-11 NOTE — TELEPHONE ENCOUNTER
PRIOR AUTHORIZATION DENIED    Medication: Xifaxan 550 mg tab    Denial Date: 4/8/2022    Denial Rational:                 Appeal Information:

## 2022-04-26 ENCOUNTER — LAB (OUTPATIENT)
Dept: LAB | Facility: CLINIC | Age: 37
End: 2022-04-26
Payer: COMMERCIAL

## 2022-04-26 DIAGNOSIS — Z95.828 S/P TIPS (TRANSJUGULAR INTRAHEPATIC PORTOSYSTEMIC SHUNT): ICD-10-CM

## 2022-04-26 LAB
ALBUMIN SERPL-MCNC: 3.6 G/DL (ref 3.5–5)
ALP SERPL-CCNC: 113 U/L (ref 45–120)
ALT SERPL W P-5'-P-CCNC: 41 U/L (ref 0–45)
ANION GAP SERPL CALCULATED.3IONS-SCNC: 10 MMOL/L (ref 5–18)
AST SERPL W P-5'-P-CCNC: 50 U/L (ref 0–40)
BILIRUB DIRECT SERPL-MCNC: 1 MG/DL
BILIRUB SERPL-MCNC: 2.2 MG/DL (ref 0–1)
BUN SERPL-MCNC: 22 MG/DL (ref 8–22)
CALCIUM SERPL-MCNC: 9.5 MG/DL (ref 8.5–10.5)
CHLORIDE BLD-SCNC: 108 MMOL/L (ref 98–107)
CO2 SERPL-SCNC: 22 MMOL/L (ref 22–31)
CREAT SERPL-MCNC: 1.06 MG/DL (ref 0.7–1.3)
ERYTHROCYTE [DISTWIDTH] IN BLOOD BY AUTOMATED COUNT: 14.1 % (ref 10–15)
GFR SERPL CREATININE-BSD FRML MDRD: >90 ML/MIN/1.73M2
GLUCOSE BLD-MCNC: 93 MG/DL (ref 70–125)
HCT VFR BLD AUTO: 39.6 % (ref 40–53)
HGB BLD-MCNC: 12.7 G/DL (ref 13.3–17.7)
INR PPP: 1.22 (ref 0.85–1.15)
MCH RBC QN AUTO: 31.4 PG (ref 26.5–33)
MCHC RBC AUTO-ENTMCNC: 32.1 G/DL (ref 31.5–36.5)
MCV RBC AUTO: 98 FL (ref 78–100)
PLATELET # BLD AUTO: 104 10E3/UL (ref 150–450)
POTASSIUM BLD-SCNC: 4.3 MMOL/L (ref 3.5–5)
PROT SERPL-MCNC: 6.1 G/DL (ref 6–8)
RBC # BLD AUTO: 4.05 10E6/UL (ref 4.4–5.9)
SODIUM SERPL-SCNC: 140 MMOL/L (ref 136–145)
WBC # BLD AUTO: 3.7 10E3/UL (ref 4–11)

## 2022-04-26 PROCEDURE — 82248 BILIRUBIN DIRECT: CPT

## 2022-04-26 PROCEDURE — 85027 COMPLETE CBC AUTOMATED: CPT

## 2022-04-26 PROCEDURE — 80053 COMPREHEN METABOLIC PANEL: CPT

## 2022-04-26 PROCEDURE — 36415 COLL VENOUS BLD VENIPUNCTURE: CPT

## 2022-04-26 PROCEDURE — 85610 PROTHROMBIN TIME: CPT

## 2022-04-27 DIAGNOSIS — K70.31 ALCOHOLIC CIRRHOSIS OF LIVER WITH ASCITES (H): ICD-10-CM

## 2022-04-27 DIAGNOSIS — K76.6 PORTAL HYPERTENSION (H): ICD-10-CM

## 2022-05-02 DIAGNOSIS — Z95.828 S/P TIPS (TRANSJUGULAR INTRAHEPATIC PORTOSYSTEMIC SHUNT): Primary | ICD-10-CM

## 2022-05-09 DIAGNOSIS — Z95.828 S/P TIPS (TRANSJUGULAR INTRAHEPATIC PORTOSYSTEMIC SHUNT): Primary | ICD-10-CM

## 2022-05-13 ENCOUNTER — HOSPITAL ENCOUNTER (OUTPATIENT)
Dept: ULTRASOUND IMAGING | Facility: CLINIC | Age: 37
Discharge: HOME | End: 2022-05-13
Attending: INTERNAL MEDICINE | Admitting: INTERNAL MEDICINE
Payer: COMMERCIAL

## 2022-05-13 DIAGNOSIS — R18.8 OTHER ASCITES: ICD-10-CM

## 2022-05-13 PROCEDURE — 272N000021 US ABDOMEN LIMITED

## 2022-05-30 DIAGNOSIS — K76.82 HEPATIC ENCEPHALOPATHY (H): ICD-10-CM

## 2022-05-31 RX ORDER — RIFAXIMIN 550 MG/1
TABLET ORAL
Qty: 60 TABLET | Refills: 11 | Status: SHIPPED | OUTPATIENT
Start: 2022-05-31 | End: 2023-06-08

## 2022-06-01 ENCOUNTER — ANCILLARY PROCEDURE (OUTPATIENT)
Dept: ULTRASOUND IMAGING | Facility: CLINIC | Age: 37
End: 2022-06-01
Attending: RADIOLOGY
Payer: COMMERCIAL

## 2022-06-01 ENCOUNTER — TELEPHONE (OUTPATIENT)
Dept: INTERVENTIONAL RADIOLOGY/VASCULAR | Facility: CLINIC | Age: 37
End: 2022-06-01

## 2022-06-01 DIAGNOSIS — Z95.828 S/P TIPS (TRANSJUGULAR INTRAHEPATIC PORTOSYSTEMIC SHUNT): ICD-10-CM

## 2022-06-01 DIAGNOSIS — Z95.828 S/P TIPS (TRANSJUGULAR INTRAHEPATIC PORTOSYSTEMIC SHUNT): Primary | ICD-10-CM

## 2022-06-01 PROCEDURE — 93975 VASCULAR STUDY: CPT | Mod: GC | Performed by: RADIOLOGY

## 2022-06-01 NOTE — TELEPHONE ENCOUNTER
I called and left a voicemail including my call back number.  I called after Dr Pizarro reviewed latest TIPS ultrasound.  All looks good, plan is for next TIPS ultrasound in 6 months and call pt with results.  I have sent [employer] message with results/recommendations.  CLIFTON Araiza, RN, BSN  Interventional Radiology Nurse Coordinator   Phone:  [phone]

## 2022-07-25 ENCOUNTER — MYC MEDICAL ADVICE (OUTPATIENT)
Dept: TRANSPLANT | Facility: CLINIC | Age: 37
End: 2022-07-25

## 2022-07-25 ENCOUNTER — TELEPHONE (OUTPATIENT)
Dept: TRANSPLANT | Facility: CLINIC | Age: 37
End: 2022-07-25

## 2022-07-25 NOTE — TELEPHONE ENCOUNTER
Patient messaged about covid + over the weekend.    Will cancel & razia his appts for 7/26      Mild symptoms reported.

## 2022-09-18 ENCOUNTER — HEALTH MAINTENANCE LETTER (OUTPATIENT)
Age: 37
End: 2022-09-18

## 2022-11-09 ENCOUNTER — TELEPHONE (OUTPATIENT)
Dept: GASTROENTEROLOGY | Facility: CLINIC | Age: 37
End: 2022-11-09

## 2022-11-09 NOTE — TELEPHONE ENCOUNTER
Prior Authorization Retail Medication Request    Medication/Dose: XIFAXAN 550 MG TABS tablet   ICD code (if different than what is on RX):  Hepatic encephalopathy [K72.90]   Previously Tried and Failed:   Rationale:      Insurance Name:  [employer]  Insurance ID:  [id number]    Pharmacy Information (if different than what is on RX)  Name: [facility] #[id number] [address]  Phone:  [phone]

## 2022-11-10 NOTE — TELEPHONE ENCOUNTER
Central Prior Authorization Team   Phone: [phone]    PA Initiation    Medication: XIFAXAN 550 MG TABS tablet   Insurance Company: [employer] - Phone [phone] Fax [phone]  Pharmacy [facility]illing the Rx[id number] [address]  Filling Pharmacy Phone: [phone]  Filling Pharmacy Fax: [phone]  Start Date: 11/10/2022

## 2022-11-10 NOTE — TELEPHONE ENCOUNTER
Prior Authorization Approval    Authorization Effective Date: 10/11/2022  Authorization Expiration Date: 5/15/2023  Medication: XIFAXAN 550 MG TABS tablet -PA APPROVED   Approved Dose/Quantity:   Reference #:     Insurance Company: [employer] - Phone [phone] Fax [phone]  Expected CoPay:       CoPay Card Available:      Foundation Assistance Needed:    Which Pharmacy is filling the prescription (Not needed for infusion/clinic administered): [id number] [address]  Pharmacy Notified: Yes- **Instructed pharmacy to notify patient when script is ready to /ship.**  Patient Notified: Yes

## 2022-11-15 DIAGNOSIS — K70.31 ALCOHOLIC CIRRHOSIS OF LIVER WITH ASCITES (H): Primary | ICD-10-CM

## 2022-11-17 DIAGNOSIS — K70.31 ALCOHOLIC CIRRHOSIS OF LIVER WITH ASCITES (H): ICD-10-CM

## 2022-11-17 RX ORDER — SPIRONOLACTONE 25 MG/1
TABLET ORAL
Qty: 90 TABLET | Refills: 1 | Status: SHIPPED | OUTPATIENT
Start: 2022-11-17 | End: 2023-10-31

## 2022-11-17 RX ORDER — FUROSEMIDE 20 MG
TABLET ORAL
Qty: 45 TABLET | Refills: 1 | Status: SHIPPED | OUTPATIENT
Start: 2022-11-17 | End: 2023-10-31

## 2022-11-23 ENCOUNTER — LAB (OUTPATIENT)
Dept: LAB | Facility: CLINIC | Age: 37
End: 2022-11-23
Payer: MEDICARE

## 2022-11-23 ENCOUNTER — OFFICE VISIT (OUTPATIENT)
Dept: GASTROENTEROLOGY | Facility: CLINIC | Age: 37
End: 2022-11-23
Attending: INTERNAL MEDICINE
Payer: MEDICARE

## 2022-11-23 VITALS
SYSTOLIC BLOOD PRESSURE: 114 MMHG | HEIGHT: 67 IN | RESPIRATION RATE: 16 BRPM | BODY MASS INDEX: 22.15 KG/M2 | OXYGEN SATURATION: 98 % | TEMPERATURE: 97.7 F | DIASTOLIC BLOOD PRESSURE: 74 MMHG | WEIGHT: 141.1 LBS | HEART RATE: 74 BPM

## 2022-11-23 DIAGNOSIS — K70.31 ALCOHOLIC CIRRHOSIS OF LIVER WITH ASCITES (H): ICD-10-CM

## 2022-11-23 DIAGNOSIS — Z95.828 S/P TIPS (TRANSJUGULAR INTRAHEPATIC PORTOSYSTEMIC SHUNT): ICD-10-CM

## 2022-11-23 DIAGNOSIS — K70.31 ALCOHOLIC CIRRHOSIS OF LIVER WITH ASCITES (H): Primary | ICD-10-CM

## 2022-11-23 LAB
ALBUMIN SERPL BCG-MCNC: 3.8 G/DL (ref 3.5–5.2)
ALP SERPL-CCNC: 185 U/L (ref 40–129)
ALT SERPL W P-5'-P-CCNC: 63 U/L (ref 10–50)
ANION GAP SERPL CALCULATED.3IONS-SCNC: 10 MMOL/L (ref 7–15)
AST SERPL W P-5'-P-CCNC: 65 U/L (ref 10–50)
BILIRUB DIRECT SERPL-MCNC: 0.46 MG/DL (ref 0–0.3)
BILIRUB SERPL-MCNC: 1 MG/DL
BUN SERPL-MCNC: 32.2 MG/DL (ref 6–20)
CALCIUM SERPL-MCNC: 9.6 MG/DL (ref 8.6–10)
CHLORIDE SERPL-SCNC: 107 MMOL/L (ref 98–107)
CREAT SERPL-MCNC: 1.2 MG/DL (ref 0.67–1.17)
DEPRECATED HCO3 PLAS-SCNC: 23 MMOL/L (ref 22–29)
ERYTHROCYTE [DISTWIDTH] IN BLOOD BY AUTOMATED COUNT: 13.1 % (ref 10–15)
GFR SERPL CREATININE-BSD FRML MDRD: 80 ML/MIN/1.73M2
GLUCOSE SERPL-MCNC: 92 MG/DL (ref 70–99)
HCT VFR BLD AUTO: 47.6 % (ref 40–53)
HGB BLD-MCNC: 15.6 G/DL (ref 13.3–17.7)
INR PPP: 1.13 (ref 0.85–1.15)
MCH RBC QN AUTO: 31.2 PG (ref 26.5–33)
MCHC RBC AUTO-ENTMCNC: 32.8 G/DL (ref 31.5–36.5)
MCV RBC AUTO: 95 FL (ref 78–100)
PLATELET # BLD AUTO: 142 10E3/UL (ref 150–450)
POTASSIUM SERPL-SCNC: 4.8 MMOL/L (ref 3.4–5.3)
PROT SERPL-MCNC: 6.5 G/DL (ref 6.4–8.3)
RBC # BLD AUTO: 5 10E6/UL (ref 4.4–5.9)
SODIUM SERPL-SCNC: 140 MMOL/L (ref 136–145)
WBC # BLD AUTO: 5.6 10E3/UL (ref 4–11)

## 2022-11-23 PROCEDURE — 82248 BILIRUBIN DIRECT: CPT | Performed by: PATHOLOGY

## 2022-11-23 PROCEDURE — 85027 COMPLETE CBC AUTOMATED: CPT | Performed by: PATHOLOGY

## 2022-11-23 PROCEDURE — 80053 COMPREHEN METABOLIC PANEL: CPT | Performed by: PATHOLOGY

## 2022-11-23 PROCEDURE — 99214 OFFICE O/P EST MOD 30 MIN: CPT | Performed by: INTERNAL MEDICINE

## 2022-11-23 PROCEDURE — 85610 PROTHROMBIN TIME: CPT | Performed by: PATHOLOGY

## 2022-11-23 PROCEDURE — G0463 HOSPITAL OUTPT CLINIC VISIT: HCPCS

## 2022-11-23 PROCEDURE — 36415 COLL VENOUS BLD VENIPUNCTURE: CPT | Performed by: PATHOLOGY

## 2022-11-23 ASSESSMENT — PAIN SCALES - GENERAL: PAINLEVEL: NO PAIN (0)

## 2022-11-23 NOTE — LETTER
11/23/2022         RE: Christo Herrera  [address]  [place] [address]        [facility] Hepatology    Follow-up Visit    CONSULTING HEALTHCARE PROVIDER:  Luda Caraballo MD       CHIEF COMPLAINT AND REASON FOR INITIAL VISIT:  Decompensated alcoholic cirrhosis.      PRIMARY CARE PHYSICIAN:  Jayjay Zaidi MD     SUBJECTIVE:  Mr. Herrera is a 37-year-old male with a history of alcohol abuse disorder, and we saw him initially for alcoholic hepatitis/alcoholic cirrhosis.  He had decompensation by the time we saw him. He had edema and ascites, and he also had minimal lethargy and no confusion.      Mr. Herrera when he came here also had problems with acute kidney injury, which has since normalized.  He did do some dialysis in 08/2020, and this has been discontinued.  Mr. Herrera on 08/27/2021 had also a TIPS procedure as the patient's MELD score was low, and he was having mostly fluid retention.  That has since improved and disappeared.  His TIPS was revised on 11/24/2021 and on his last Doppler was working well.  He does not have any need now for any paracentesis.     Today he is doing quite well and dry.  In 07/2021, he got the COVID infection, and he did not get very sick since he had already vaccination with the Pfizer brand x4.  Today he is not jaundiced, nor does he have any edema in the lower extremities or abdominal distention.  He denies, also, any abdominal pain, nausea or vomiting.  He does not show any signs of hepatic encephalopathy. Since we last saw him, also, he did not have any gastrointestinal bleeding.  Mr. Herrera also has bowel movements 2-3 times a day with no blood in it.  His weight has remained stable.  Appetite is also good.    Medical hx Surgical hx   Past Medical History:   Diagnosis Date     Alcoholic cirrhosis of liver with ascites (H) 03/10/2020     Ascites      CKD (chronic kidney disease) stage 3, GFR 30-59 ml/min (H)      Essential (primary) hypertension  09/17/2020     GERD (gastroesophageal reflux disease)      HE (hepatic encephalopathy)      History of blood transfusion      History of ESRD requiring dialysis 12/2019    Resolved     Restless legs syndrome (RLS)       Past Surgical History:   Procedure Laterality Date     COLONOSCOPY      [facility] 2020     DENTAL SURGERY  2014    wisdom teeth     GI SURGERY       IR PARACENTESIS  8/27/2021     IR TRANSVEN INTRAHEPATIC PORTOSYST REV  11/24/2021     IR TRANSVEN INTRAHEPATIC PORTOSYST SHUNT  8/27/2021     US PARACENTESIS WITH ALBUMIN  04/02/2020     US PARACENTESIS WITH ALBUMIN  04/21/2020     US PARACENTESIS WITH ALBUMIN  06/04/2020     US PARACENTESIS WITH ALBUMIN  06/19/2020     US PARACENTESIS WITH ALBUMIN  07/29/2020     US PARACENTESIS WITH ALBUMIN  10/05/2020     US PARACENTESIS WITH ALBUMIN  11/03/2020     US PARACENTESIS WITH ALBUMIN  11/11/2020     US PARACENTESIS WITH ALBUMIN  11/24/2020     US PARACENTESIS WITH ALBUMIN  12/03/2020     US PARACENTESIS WITH ALBUMIN  12/11/2020     US PARACENTESIS WITH ALBUMIN  12/18/2020     US PARACENTESIS WITH ALBUMIN  12/30/2020     US PARACENTESIS WITH ALBUMIN  01/07/2021     US PARACENTESIS WITH ALBUMIN  01/18/2021     US PARACENTESIS WITH ALBUMIN  01/27/2021     US PARACENTESIS WITH ALBUMIN  02/05/2021     US PARACENTESIS WITH ALBUMIN  02/16/2021     US PARACENTESIS WITH ALBUMIN  02/26/2021     US PARACENTESIS WITH ALBUMIN  03/12/2021     US PARACENTESIS WITH ALBUMIN  03/22/2021     US PARACENTESIS WITH ALBUMIN  04/01/2021     US PARACENTESIS WITH ALBUMIN  04/12/2021     US PARACENTESIS WITH ALBUMIN  04/23/2021     US PARACENTESIS WITH ALBUMIN  05/04/2021     US PARACENTESIS WITH ALBUMIN  05/14/2021     US PARACENTESIS WITH ALBUMIN  05/24/2021     US PARACENTESIS WITH ALBUMIN  06/03/2021     US PARACENTESIS WITH ALBUMIN  06/14/2021     US PARACENTESIS WITH ALBUMIN  06/25/2021     US PARACENTESIS WITH ALBUMIN  07/05/2021          Medications  Prior to Admission  "medications    Medication Sig Start Date End Date Taking? Authorizing Provider   furosemide (LASIX) 20 MG tablet TAKE 1/2 TABLET(10 MG) BY MOUTH DAILY 11/17/22  Yes Brandin Echavarria MD   lactulose (CHRONULAC) 10 GM/15ML solution Take 10-15 mLs by mouth 2 times daily 2/15/20  Yes Reported, Patient   Menthol-Methyl Salicylate (ICY HOT EXTRA STRENGTH) 10-30 % CREA Apply topically daily as needed    Yes Reported, Patient   midodrine (PROAMATINE) 5 MG tablet Take 2.5 mg by mouth 3 times daily as needed 2/15/20  Yes Reported, Patient   Multiple Vitamins-Minerals (SUPER THERA PRERNA M) TABS Take 1 tablet by mouth every morning  12/29/19  Yes Reported, Patient   Psyllium 58.12 % PACK Take by mouth daily as needed    Yes Reported, Patient   spironolactone (ALDACTONE) 25 MG tablet TAKE 1 TABLET(25 MG) BY MOUTH DAILY 11/17/22  Yes Brandin Echavarria MD   XIFAXAN 550 MG TABS tablet TAKE 1 TABLET(550 MG) BY MOUTH TWICE DAILY 5/31/22  Yes Brandin Echavarria MD   gabapentin (NEURONTIN) 100 MG capsule Take 100 mg by mouth nightly as needed  8/11/20   Reported, Patient   guaiFENesin (MUCINEX) 600 MG 12 hr tablet Take 600 mg by mouth 2 times daily as needed for congestion or cough  2/15/20   Reported, Patient   ondansetron (ZOFRAN) 8 MG tablet Take 0.5 tablets (4 mg) by mouth every 8 hours as needed for nausea 8/27/21   Av Waldron MD       Allergies  Allergies   Allergen Reactions     Blood Transfusion Related (Informational Only) Other (See Comments)     Patient has a history of a clinically significant antibody against RBC antigens.  A delay in compatible RBCs may occur.        Review of systems  A 10-point review of systems was negative    Examination  /74 (BP Location: Right arm, Patient Position: Sitting, Cuff Size: Adult Regular)   Pulse 74   Temp 97.7  F (36.5  C) (Oral)   Resp 16   Ht 1.702 m (5' 7.01\")   Wt 64 kg (141 lb 1.6 oz)   SpO2 98%   BMI 22.09 kg/m    Body mass index " is 22.09 kg/m .    Gen- well, NAD, A+Ox3, normal color  Lym- no palpable LAD  CVS- RRR  RS- CTA  Abd- Not distended.  Extr- hands normal, no LARISSA  Skin- no rash or jaundice  Neuro- no asterixis  Psych- normal mood    Laboratory  Lab Results   Component Value Date     11/23/2022     01/12/2021    POTASSIUM 4.8 11/23/2022    POTASSIUM 4.3 04/26/2022    POTASSIUM 5.4 01/12/2021    CHLORIDE 107 11/23/2022    CHLORIDE 108 04/26/2022    CHLORIDE 108 01/12/2021    CO2 23 11/23/2022    CO2 22 04/26/2022    CO2 23 01/12/2021    BUN 32.2 11/23/2022    BUN 22 04/26/2022    BUN 48 01/12/2021    CR 1.20 11/23/2022    CR 1.39 01/12/2021       Lab Results   Component Value Date    BILITOTAL 1.0 11/23/2022    BILITOTAL 1.2 01/12/2021    ALT 63 11/23/2022    ALT 32 01/12/2021    AST 65 11/23/2022    AST 43 01/12/2021    ALKPHOS 185 11/23/2022    ALKPHOS 128 01/12/2021       Lab Results   Component Value Date    ALBUMIN 3.8 11/23/2022    ALBUMIN 3.6 04/26/2022    ALBUMIN 3.3 01/12/2021    PROTTOTAL 6.5 11/23/2022    PROTTOTAL 6.1 01/12/2021        Lab Results   Component Value Date    WBC 5.6 11/23/2022    WBC 4.9 01/12/2021    HGB 15.6 11/23/2022    HGB 13.4 01/12/2021    MCV 95 11/23/2022    MCV 95 01/12/2021     11/23/2022     01/12/2021       Lab Results   Component Value Date    INR 1.13 11/23/2022    INR 1.2 07/20/2021    INR 1.31 01/12/2021   MELD-Na score: 9 at 11/23/2022  9:23 AM  MELD score: 9 at 11/23/2022  9:23 AM  Calculated from:  Serum Creatinine: 1.20 mg/dL at 11/23/2022  9:23 AM  Serum Sodium: 140 mmol/L (Using max of 137 mmol/L) at 11/23/2022  9:23 AM  Total Bilirubin: 1.0 mg/dL at 11/23/2022  9:23 AM  INR(ratio): 1.13 at 11/23/2022  9:23 AM  Age: 37 years      Radiology    ASSESSMENT AND PLAN:  Decompensated alcoholic liver disease.  Mr. Herrera had what appeared to be decompensated alcoholic cirrhosis.  It might have been acute on chronic, as he might have had hepatitis/advanced liver  disease but his MELD score was low.  We did evaluate him and eventually did a TIPS procedure and this was revised also once and now it is patent.  To the advantage of that, his fluid retention, which was his main problem, resolved.      He has not required any paracentesis now and his edema was resolved.  Please note that the patient today is very lucid and states that with the rifaximin and the lactulose, he is doing quite well and does not have any signs of confusion.  Otherwise, his MELD score which was 11 on 01/04/2022 is currently 9 which is an improvement.  He is listed for liver transplantation.  We will follow him for a while and see how he does before we do any change in his status.  Besides that, he will need surveillance for HCC and also for the functionality of his TIPS on a regular basis.  He will be seen here in 6 months.  Mr. Herrera, for his other medical issues, will follow with his primary care physician and we will see him in 6 months.    I spent, on this encounter of 11/23/2022, 30 minutes in chart reviewing, history taking, physical examination and documentation.  I spent some of the time also in coordination of care and counseling.      Brandin Echavarria MD  Hepatology  [facility]          Brandin Echavarria MD

## 2022-11-23 NOTE — LETTER
11/23/2022      RE: Christo Herrera  [address]  [place] [address]         [facility] Hepatology    Follow-up Visit    CONSULTING HEALTHCARE PROVIDER:  Luda Caraballo MD       CHIEF COMPLAINT AND REASON FOR INITIAL VISIT:  Decompensated alcoholic cirrhosis.      PRIMARY CARE PHYSICIAN:  Jayjay Zaidi MD     SUBJECTIVE:  Mr. Herrera is a 37-year-old male with a history of alcohol abuse disorder, and we saw him initially for alcoholic hepatitis/alcoholic cirrhosis.  He had decompensation by the time we saw him. He had edema and ascites, and he also had minimal lethargy and no confusion.      Mr. Herrera when he came here also had problems with acute kidney injury, which has since normalized.  He did do some dialysis in 08/2020, and this has been discontinued.  Mr. Herrera on 08/27/2021 had also a TIPS procedure as the patient's MELD score was low, and he was having mostly fluid retention.  That has since improved and disappeared.  His TIPS was revised on 11/24/2021 and on his last Doppler was working well.  He does not have any need now for any paracentesis.     Today he is doing quite well and dry.  In 07/2021, he got the COVID infection, and he did not get very sick since he had already vaccination with the Pfizer brand x4.  Today he is not jaundiced, nor does he have any edema in the lower extremities or abdominal distention.  He denies, also, any abdominal pain, nausea or vomiting.  He does not show any signs of hepatic encephalopathy. Since we last saw him, also, he did not have any gastrointestinal bleeding.  Mr. Herrera also has bowel movements 2-3 times a day with no blood in it.  His weight has remained stable.  Appetite is also good.    Medical hx Surgical hx   Past Medical History:   Diagnosis Date     Alcoholic cirrhosis of liver with ascites (H) 03/10/2020     Ascites      CKD (chronic kidney disease) stage 3, GFR 30-59 ml/min (H)      Essential (primary) hypertension  09/17/2020     GERD (gastroesophageal reflux disease)      HE (hepatic encephalopathy)      History of blood transfusion      History of ESRD requiring dialysis 12/2019    Resolved     Restless legs syndrome (RLS)       Past Surgical History:   Procedure Laterality Date     COLONOSCOPY      [facility]     DENTAL SURGERY  2014    wisdom teeth     GI SURGERY       IR PARACENTESIS  8/27/2021     IR TRANSVEN INTRAHEPATIC PORTOSYST REV  11/24/2021     IR TRANSVEN INTRAHEPATIC PORTOSYST SHUNT  8/27/2021     US PARACENTESIS WITH ALBUMIN  04/02/2020     US PARACENTESIS WITH ALBUMIN  04/21/2020     US PARACENTESIS WITH ALBUMIN  06/04/2020     US PARACENTESIS WITH ALBUMIN  06/19/2020     US PARACENTESIS WITH ALBUMIN  07/29/2020     US PARACENTESIS WITH ALBUMIN  10/05/2020     US PARACENTESIS WITH ALBUMIN  11/03/2020     US PARACENTESIS WITH ALBUMIN  11/11/2020     US PARACENTESIS WITH ALBUMIN  11/24/2020     US PARACENTESIS WITH ALBUMIN  12/03/2020     US PARACENTESIS WITH ALBUMIN  12/11/2020     US PARACENTESIS WITH ALBUMIN  12/18/2020     US PARACENTESIS WITH ALBUMIN  12/30/2020     US PARACENTESIS WITH ALBUMIN  01/07/2021     US PARACENTESIS WITH ALBUMIN  01/18/2021     US PARACENTESIS WITH ALBUMIN  01/27/2021     US PARACENTESIS WITH ALBUMIN  02/05/2021     US PARACENTESIS WITH ALBUMIN  02/16/2021     US PARACENTESIS WITH ALBUMIN  02/26/2021     US PARACENTESIS WITH ALBUMIN  03/12/2021     US PARACENTESIS WITH ALBUMIN  03/22/2021     US PARACENTESIS WITH ALBUMIN  04/01/2021     US PARACENTESIS WITH ALBUMIN  04/12/2021     US PARACENTESIS WITH ALBUMIN  04/23/2021     US PARACENTESIS WITH ALBUMIN  05/04/2021     US PARACENTESIS WITH ALBUMIN  05/14/2021     US PARACENTESIS WITH ALBUMIN  05/24/2021     US PARACENTESIS WITH ALBUMIN  06/03/2021     US PARACENTESIS WITH ALBUMIN  06/14/2021     US PARACENTESIS WITH ALBUMIN  06/25/2021     US PARACENTESIS WITH ALBUMIN  07/05/2021          Medications  Prior to Admission  "medications    Medication Sig Start Date End Date Taking? Authorizing Provider   furosemide (LASIX) 20 MG tablet TAKE 1/2 TABLET(10 MG) BY MOUTH DAILY 11/17/22  Yes Brandin Echavarria MD   lactulose (CHRONULAC) 10 GM/15ML solution Take 10-15 mLs by mouth 2 times daily 2/15/20  Yes Reported, Patient   Menthol-Methyl Salicylate (ICY HOT EXTRA STRENGTH) 10-30 % CREA Apply topically daily as needed    Yes Reported, Patient   midodrine (PROAMATINE) 5 MG tablet Take 2.5 mg by mouth 3 times daily as needed 2/15/20  Yes Reported, Patient   Multiple Vitamins-Minerals (SUPER THERA PRERNA M) TABS Take 1 tablet by mouth every morning  12/29/19  Yes Reported, Patient   Psyllium 58.12 % PACK Take by mouth daily as needed    Yes Reported, Patient   spironolactone (ALDACTONE) 25 MG tablet TAKE 1 TABLET(25 MG) BY MOUTH DAILY 11/17/22  Yes Brandin Echavarria MD   XIFAXAN 550 MG TABS tablet TAKE 1 TABLET(550 MG) BY MOUTH TWICE DAILY 5/31/22  Yes Brandin Echavarria MD   gabapentin (NEURONTIN) 100 MG capsule Take 100 mg by mouth nightly as needed  8/11/20   Reported, Patient   guaiFENesin (MUCINEX) 600 MG 12 hr tablet Take 600 mg by mouth 2 times daily as needed for congestion or cough  2/15/20   Reported, Patient   ondansetron (ZOFRAN) 8 MG tablet Take 0.5 tablets (4 mg) by mouth every 8 hours as needed for nausea 8/27/21   Av Waldron MD       Allergies  Allergies   Allergen Reactions     Blood Transfusion Related (Informational Only) Other (See Comments)     Patient has a history of a clinically significant antibody against RBC antigens.  A delay in compatible RBCs may occur.        Review of systems  A 10-point review of systems was negative    Examination  /74 (BP Location: Right arm, Patient Position: Sitting, Cuff Size: Adult Regular)   Pulse 74   Temp 97.7  F (36.5  C) (Oral)   Resp 16   Ht 1.702 m (5' 7.01\")   Wt 64 kg (141 lb 1.6 oz)   SpO2 98%   BMI 22.09 kg/m    Body mass index " is 22.09 kg/m .    Gen- well, NAD, A+Ox3, normal color  Lym- no palpable LAD  CVS- RRR  RS- CTA  Abd- Not distended.  Extr- hands normal, no LARISSA  Skin- no rash or jaundice  Neuro- no asterixis  Psych- normal mood    Laboratory  Lab Results   Component Value Date     11/23/2022     01/12/2021    POTASSIUM 4.8 11/23/2022    POTASSIUM 4.3 04/26/2022    POTASSIUM 5.4 01/12/2021    CHLORIDE 107 11/23/2022    CHLORIDE 108 04/26/2022    CHLORIDE 108 01/12/2021    CO2 23 11/23/2022    CO2 22 04/26/2022    CO2 23 01/12/2021    BUN 32.2 11/23/2022    BUN 22 04/26/2022    BUN 48 01/12/2021    CR 1.20 11/23/2022    CR 1.39 01/12/2021       Lab Results   Component Value Date    BILITOTAL 1.0 11/23/2022    BILITOTAL 1.2 01/12/2021    ALT 63 11/23/2022    ALT 32 01/12/2021    AST 65 11/23/2022    AST 43 01/12/2021    ALKPHOS 185 11/23/2022    ALKPHOS 128 01/12/2021       Lab Results   Component Value Date    ALBUMIN 3.8 11/23/2022    ALBUMIN 3.6 04/26/2022    ALBUMIN 3.3 01/12/2021    PROTTOTAL 6.5 11/23/2022    PROTTOTAL 6.1 01/12/2021        Lab Results   Component Value Date    WBC 5.6 11/23/2022    WBC 4.9 01/12/2021    HGB 15.6 11/23/2022    HGB 13.4 01/12/2021    MCV 95 11/23/2022    MCV 95 01/12/2021     11/23/2022     01/12/2021       Lab Results   Component Value Date    INR 1.13 11/23/2022    INR 1.2 07/20/2021    INR 1.31 01/12/2021   MELD-Na score: 9 at 11/23/2022  9:23 AM  MELD score: 9 at 11/23/2022  9:23 AM  Calculated from:  Serum Creatinine: 1.20 mg/dL at 11/23/2022  9:23 AM  Serum Sodium: 140 mmol/L (Using max of 137 mmol/L) at 11/23/2022  9:23 AM  Total Bilirubin: 1.0 mg/dL at 11/23/2022  9:23 AM  INR(ratio): 1.13 at 11/23/2022  9:23 AM  Age: 37 years      Radiology    ASSESSMENT AND PLAN:  Decompensated alcoholic liver disease.  Mr. Herrera had what appeared to be decompensated alcoholic cirrhosis.  It might have been acute on chronic, as he might have had hepatitis/advanced liver  disease but his MELD score was low.  We did evaluate him and eventually did a TIPS procedure and this was revised also once and now it is patent.  To the advantage of that, his fluid retention, which was his main problem, resolved.      He has not required any paracentesis now and his edema was resolved.  Please note that the patient today is very lucid and states that with the rifaximin and the lactulose, he is doing quite well and does not have any signs of confusion.  Otherwise, his MELD score which was 11 on 01/04/2022 is currently 9 which is an improvement.  He is listed for liver transplantation.  We will follow him for a while and see how he does before we do any change in his status.  Besides that, he will need surveillance for HCC and also for the functionality of his TIPS on a regular basis.  He will be seen here in 6 months.  Mr. Herrera, for his other medical issues, will follow with his primary care physician and we will see him in 6 months.    I spent, on this encounter of 11/23/2022, 30 minutes in chart reviewing, history taking, physical examination and documentation.  I spent some of the time also in coordination of care and counseling.      Brandin Echavarria MD  Hepatology  [facility]

## 2022-11-23 NOTE — NURSING NOTE
"Chief Complaint   Patient presents with     RECHECK     alcohol induced cirrhosis     Vital signs:  Temp: 97.7  F (36.5  C) Temp src: Oral BP: 114/74 Pulse: 74   Resp: 16 SpO2: 98 %     Height: 170.2 cm (5' 7.01\") Weight: 64 kg (141 lb 1.6 oz)  Estimated body mass index is 22.09 kg/m  as calculated from the following:    Height as of this encounter: 1.702 m (5' 7.01\").    Weight as of this encounter: 64 kg (141 lb 1.6 oz).        Anna Gifford, [facility]  11/23/2022 10:36 AM      "

## 2022-12-01 ENCOUNTER — ANCILLARY PROCEDURE (OUTPATIENT)
Dept: ULTRASOUND IMAGING | Facility: CLINIC | Age: 37
End: 2022-12-01
Attending: RADIOLOGY
Payer: MEDICARE

## 2022-12-01 DIAGNOSIS — Z95.828 S/P TIPS (TRANSJUGULAR INTRAHEPATIC PORTOSYSTEMIC SHUNT): ICD-10-CM

## 2022-12-01 PROCEDURE — 93975 VASCULAR STUDY: CPT | Mod: GC | Performed by: RADIOLOGY

## 2023-01-10 ENCOUNTER — TELEPHONE (OUTPATIENT)
Dept: TRANSPLANT | Facility: CLINIC | Age: 38
End: 2023-01-10

## 2023-01-10 NOTE — PROGRESS NOTES
Transplant Social Work Services Phone Call      Data: Nelson is listed for a liver transplant.  Intervention: I received a call from Nelson's mother Sherry Moreira and I provided education about the Caregiver Agreement for Liver Transplant.  Assessment: Nelson lives with his mother Sherry and she is one of his identified care givers.  Sherry reports she is able and willing to provide post transplant care giving if patient undergoes liver transplantation.  She works from home and has flexibility with her employment.  Additional care giving would come from Nelson's father Nelson and brother Emiliano.  Education provided by SW: Caregiver Agreement for Liver Transplant  Plan: I will remain available for the psychosocial needs of this patient.      KAYA Lacey, [facility]  Liver Transplant   Phone [phone]  Pager [phone]

## 2023-01-29 ENCOUNTER — HEALTH MAINTENANCE LETTER (OUTPATIENT)
Age: 38
End: 2023-01-29

## 2023-02-17 DIAGNOSIS — K74.60 CIRRHOSIS (H): Primary | ICD-10-CM

## 2023-05-16 ENCOUNTER — TELEPHONE (OUTPATIENT)
Dept: GASTROENTEROLOGY | Facility: CLINIC | Age: 38
End: 2023-05-16
Payer: MEDICARE

## 2023-05-16 NOTE — TELEPHONE ENCOUNTER
Spoke with Christo and confirmed lab and appointment with Dr. Echavarria on Wed 5/23/2023, he verbally understood and agreed.    Anna Gifford, [facility]  5/16/2023 3:47 PM

## 2023-05-19 DIAGNOSIS — K70.31 ALCOHOLIC CIRRHOSIS OF LIVER WITH ASCITES (H): Primary | ICD-10-CM

## 2023-05-19 DIAGNOSIS — Z95.828 S/P TIPS (TRANSJUGULAR INTRAHEPATIC PORTOSYSTEMIC SHUNT): ICD-10-CM

## 2023-05-24 ENCOUNTER — OFFICE VISIT (OUTPATIENT)
Dept: GASTROENTEROLOGY | Facility: CLINIC | Age: 38
End: 2023-05-24
Attending: INTERNAL MEDICINE
Payer: MEDICARE

## 2023-05-24 ENCOUNTER — LAB (OUTPATIENT)
Dept: LAB | Facility: CLINIC | Age: 38
End: 2023-05-24
Payer: MEDICARE

## 2023-05-24 VITALS
TEMPERATURE: 97.7 F | OXYGEN SATURATION: 97 % | DIASTOLIC BLOOD PRESSURE: 87 MMHG | HEART RATE: 69 BPM | BODY MASS INDEX: 23.46 KG/M2 | SYSTOLIC BLOOD PRESSURE: 130 MMHG | WEIGHT: 149.8 LBS

## 2023-05-24 DIAGNOSIS — K70.31 ALCOHOLIC CIRRHOSIS OF LIVER WITH ASCITES (H): ICD-10-CM

## 2023-05-24 DIAGNOSIS — Z95.828 S/P TIPS (TRANSJUGULAR INTRAHEPATIC PORTOSYSTEMIC SHUNT): ICD-10-CM

## 2023-05-24 DIAGNOSIS — K70.31 ALCOHOLIC CIRRHOSIS OF LIVER WITH ASCITES (H): Primary | ICD-10-CM

## 2023-05-24 LAB
ALBUMIN SERPL BCG-MCNC: 3.8 G/DL (ref 3.5–5.2)
ALP SERPL-CCNC: 202 U/L (ref 40–129)
ALT SERPL W P-5'-P-CCNC: 63 U/L (ref 10–50)
ANION GAP SERPL CALCULATED.3IONS-SCNC: 7 MMOL/L (ref 7–15)
AST SERPL W P-5'-P-CCNC: 52 U/L (ref 10–50)
BILIRUB DIRECT SERPL-MCNC: 0.47 MG/DL (ref 0–0.3)
BILIRUB SERPL-MCNC: 0.9 MG/DL
BUN SERPL-MCNC: 25.1 MG/DL (ref 6–20)
CALCIUM SERPL-MCNC: 9.4 MG/DL (ref 8.6–10)
CHLORIDE SERPL-SCNC: 107 MMOL/L (ref 98–107)
CREAT SERPL-MCNC: 1.21 MG/DL (ref 0.67–1.17)
DEPRECATED HCO3 PLAS-SCNC: 26 MMOL/L (ref 22–29)
ERYTHROCYTE [DISTWIDTH] IN BLOOD BY AUTOMATED COUNT: 13.1 % (ref 10–15)
GFR SERPL CREATININE-BSD FRML MDRD: 80 ML/MIN/1.73M2
GLUCOSE SERPL-MCNC: 89 MG/DL (ref 70–99)
HCT VFR BLD AUTO: 49.6 % (ref 40–53)
HGB BLD-MCNC: 16.1 G/DL (ref 13.3–17.7)
INR PPP: 1.14 (ref 0.85–1.15)
MCH RBC QN AUTO: 31 PG (ref 26.5–33)
MCHC RBC AUTO-ENTMCNC: 32.5 G/DL (ref 31.5–36.5)
MCV RBC AUTO: 96 FL (ref 78–100)
PLATELET # BLD AUTO: 142 10E3/UL (ref 150–450)
POTASSIUM SERPL-SCNC: 4.7 MMOL/L (ref 3.4–5.3)
PROT SERPL-MCNC: 6.4 G/DL (ref 6.4–8.3)
RBC # BLD AUTO: 5.19 10E6/UL (ref 4.4–5.9)
SODIUM SERPL-SCNC: 140 MMOL/L (ref 136–145)
WBC # BLD AUTO: 5.4 10E3/UL (ref 4–11)

## 2023-05-24 PROCEDURE — 99214 OFFICE O/P EST MOD 30 MIN: CPT | Mod: GC | Performed by: INTERNAL MEDICINE

## 2023-05-24 PROCEDURE — 85027 COMPLETE CBC AUTOMATED: CPT | Performed by: PATHOLOGY

## 2023-05-24 PROCEDURE — 80053 COMPREHEN METABOLIC PANEL: CPT | Performed by: PATHOLOGY

## 2023-05-24 PROCEDURE — 36415 COLL VENOUS BLD VENIPUNCTURE: CPT | Performed by: PATHOLOGY

## 2023-05-24 PROCEDURE — 80321 ALCOHOLS BIOMARKERS 1OR 2: CPT | Mod: 90 | Performed by: PATHOLOGY

## 2023-05-24 PROCEDURE — 82248 BILIRUBIN DIRECT: CPT | Performed by: PATHOLOGY

## 2023-05-24 PROCEDURE — 99000 SPECIMEN HANDLING OFFICE-LAB: CPT | Performed by: PATHOLOGY

## 2023-05-24 PROCEDURE — G0463 HOSPITAL OUTPT CLINIC VISIT: HCPCS | Performed by: INTERNAL MEDICINE

## 2023-05-24 PROCEDURE — 85610 PROTHROMBIN TIME: CPT | Performed by: PATHOLOGY

## 2023-05-24 RX ORDER — ACETAMINOPHEN 325 MG/1
325-650 TABLET ORAL EVERY 6 HOURS PRN
COMMUNITY

## 2023-05-24 ASSESSMENT — PAIN SCALES - GENERAL: PAINLEVEL: NO PAIN (0)

## 2023-05-24 NOTE — LETTER
5/24/2023         RE: Christo Herrera  [address] [place] [address]        Dear Colleague,    Thank you for referring your patient, Christo Herrera, to the [facility] [place]. Please see a copy of my visit note below.    [facility] Hepatology    Follow-up Visit    Follow-up visit for alcoholic cirrhosis s/p TIPS    Subjective:  37 year old male w/ hx of alcoholic hepatitis/alcoholic cirrhosis s/p TIPS (8/2021) who presents for 6 month follow up. He did have hx of severe MARYBEL requiring temporary dialysis on 08/2020. On initial presentation pt's MELD score was low though he did have fluid retention. Since then his fluid retention has resolved. Today he is doing well. He is up to date on his vaccinations. Continues to take rifaximin and lactulose. Having 2-3 stools/day. Does report difficulty with memory, though much improved from prior. He has gained weight which was intentional. Reports he has been exercising and trying to gain muscle. Reports good appetite.     Patient denies jaundice, lower extremity edema, abdominal distension, lethargy or confusion. Patient denies melena, hematemesis or hematochezia. Patient denies fevers, sweats or chills.  Weight stable.        Medical hx Surgical hx   Past Medical History:   Diagnosis Date    Alcoholic cirrhosis of liver with ascites (H) 03/10/2020    Ascites     CKD (chronic kidney disease) stage 3, GFR 30-59 ml/min (H)     Essential (primary) hypertension 09/17/2020    GERD (gastroesophageal reflux disease)     HE (hepatic encephalopathy) (H)     History of blood transfusion     History of ESRD requiring dialysis 12/2019    Resolved    Restless legs syndrome (RLS)       Past Surgical History:   Procedure Laterality Date    COLONOSCOPY      [facility] 2020    DENTAL SURGERY  2014    wisdom teeth    GI SURGERY      IR PARACENTESIS  8/27/2021    IR TRANSVEN INTRAHEPATIC PORTOSYST REV  11/24/2021    IR TRANSVEN INTRAHEPATIC  PORTOSYST SHUNT  8/27/2021    US PARACENTESIS WITH ALBUMIN  04/02/2020    US PARACENTESIS WITH ALBUMIN  04/21/2020    US PARACENTESIS WITH ALBUMIN  06/04/2020    US PARACENTESIS WITH ALBUMIN  06/19/2020    US PARACENTESIS WITH ALBUMIN  07/29/2020    US PARACENTESIS WITH ALBUMIN  10/05/2020    US PARACENTESIS WITH ALBUMIN  11/03/2020    US PARACENTESIS WITH ALBUMIN  11/11/2020    US PARACENTESIS WITH ALBUMIN  11/24/2020    US PARACENTESIS WITH ALBUMIN  12/03/2020    US PARACENTESIS WITH ALBUMIN  12/11/2020    US PARACENTESIS WITH ALBUMIN  12/18/2020    US PARACENTESIS WITH ALBUMIN  12/30/2020    US PARACENTESIS WITH ALBUMIN  01/07/2021    US PARACENTESIS WITH ALBUMIN  01/18/2021    US PARACENTESIS WITH ALBUMIN  01/27/2021    US PARACENTESIS WITH ALBUMIN  02/05/2021    US PARACENTESIS WITH ALBUMIN  02/16/2021    US PARACENTESIS WITH ALBUMIN  02/26/2021    US PARACENTESIS WITH ALBUMIN  03/12/2021    US PARACENTESIS WITH ALBUMIN  03/22/2021    US PARACENTESIS WITH ALBUMIN  04/01/2021    US PARACENTESIS WITH ALBUMIN  04/12/2021    US PARACENTESIS WITH ALBUMIN  04/23/2021    US PARACENTESIS WITH ALBUMIN  05/04/2021    US PARACENTESIS WITH ALBUMIN  05/14/2021    US PARACENTESIS WITH ALBUMIN  05/24/2021    US PARACENTESIS WITH ALBUMIN  06/03/2021    US PARACENTESIS WITH ALBUMIN  06/14/2021    US PARACENTESIS WITH ALBUMIN  06/25/2021    US PARACENTESIS WITH ALBUMIN  07/05/2021          Medications  Prior to Admission medications    Medication Sig Start Date End Date Taking? Authorizing Provider   acetaminophen (TYLENOL) 325 MG tablet Take 325-650 mg by mouth every 6 hours as needed for mild pain   Yes Reported, Patient   lactulose (CHRONULAC) 10 GM/15ML solution Take 10-15 mLs by mouth 2 times daily 2/15/20  Yes Reported, Patient   Menthol-Methyl Salicylate (ICY HOT EXTRA STRENGTH) 10-30 % CREA Apply topically daily as needed    Yes Reported, Patient   Multiple Vitamins-Minerals (SUPER THERA PRERNA M) TABS Take 1 tablet by  mouth every morning  12/29/19  Yes Reported, Patient   Psyllium 58.12 % PACK Take by mouth daily as needed    Yes Reported, Patient   XIFAXAN 550 MG TABS tablet TAKE 1 TABLET(550 MG) BY MOUTH TWICE DAILY 5/31/22  Yes Brandin Echavarria MD   furosemide (LASIX) 20 MG tablet TAKE 1/2 TABLET(10 MG) BY MOUTH DAILY  Patient not taking: Reported on 5/24/2023 11/17/22   Brandin Echavarria MD   gabapentin (NEURONTIN) 100 MG capsule Take 100 mg by mouth nightly as needed  8/11/20   Reported, Patient   guaiFENesin (MUCINEX) 600 MG 12 hr tablet Take 600 mg by mouth 2 times daily as needed for congestion or cough  2/15/20   Reported, Patient   midodrine (PROAMATINE) 5 MG tablet Take 2.5 mg by mouth 3 times daily as needed  Patient not taking: Reported on 5/24/2023 2/15/20   Reported, Patient   ondansetron (ZOFRAN) 8 MG tablet Take 0.5 tablets (4 mg) by mouth every 8 hours as needed for nausea 8/27/21   Av Waldron MD   spironolactone (ALDACTONE) 25 MG tablet TAKE 1 TABLET(25 MG) BY MOUTH DAILY 11/17/22   Brandin Echavarria MD       Allergies  Allergies   Allergen Reactions    Blood Transfusion Related (Informational Only) Other (See Comments)     Patient has a history of a clinically significant antibody against RBC antigens.  A delay in compatible RBCs may occur.        Review of systems  A 10-point review of systems was negative    Examination  /87   Pulse 69   Temp 97.7  F (36.5  C) (Oral)   Wt 67.9 kg (149 lb 12.8 oz)   SpO2 97%   BMI 23.46 kg/m    Body mass index is 23.46 kg/m .    Gen- well, NAD, A+Ox3, normal color  RS- normal work of breathing, on RA   Abd- soft, ND, NT, healed surgical scars present, stretch marks present   Extr- hands normal, no LARISSA  Skin- no rash or jaundice  Neuro- no asterixis  Psych- normal mood    Laboratory  Lab Results   Component Value Date     05/24/2023     01/12/2021    POTASSIUM 4.7 05/24/2023    POTASSIUM 4.3 04/26/2022     POTASSIUM 5.4 01/12/2021    CHLORIDE 107 05/24/2023    CHLORIDE 108 04/26/2022    CHLORIDE 108 01/12/2021    CO2 26 05/24/2023    CO2 22 04/26/2022    CO2 23 01/12/2021    BUN 25.1 05/24/2023    BUN 22 04/26/2022    BUN 48 01/12/2021    CR 1.21 05/24/2023    CR 1.39 01/12/2021       Lab Results   Component Value Date    BILITOTAL 0.9 05/24/2023    BILITOTAL 1.2 01/12/2021    ALT 63 05/24/2023    ALT 32 01/12/2021    AST 52 05/24/2023    AST 43 01/12/2021    ALKPHOS 202 05/24/2023    ALKPHOS 128 01/12/2021       Lab Results   Component Value Date    ALBUMIN 3.8 05/24/2023    ALBUMIN 3.6 04/26/2022    ALBUMIN 3.3 01/12/2021    PROTTOTAL 6.4 05/24/2023    PROTTOTAL 6.1 01/12/2021        Lab Results   Component Value Date    WBC 5.4 05/24/2023    WBC 4.9 01/12/2021    HGB 16.1 05/24/2023    HGB 13.4 01/12/2021    MCV 96 05/24/2023    MCV 95 01/12/2021     05/24/2023     01/12/2021       Lab Results   Component Value Date    INR 1.14 05/24/2023    INR 1.2 07/20/2021    INR 1.31 01/12/2021     MELD-Na score: 9 at 5/24/2023  7:52 AM  MELD score: 9 at 5/24/2023  7:52 AM  Calculated from:  Serum Creatinine: 1.21 mg/dL at 5/24/2023  7:52 AM  Serum Sodium: 140 mmol/L (Using max of 137 mmol/L) at 5/24/2023  7:52 AM  Total Bilirubin: 0.9 mg/dL (Using min of 1 mg/dL) at 5/24/2023  7:52 AM  INR(ratio): 1.14 at 5/24/2023  7:52 AM  Age: 37 years    Radiology    Assessment  37 year old male w/ hx of alcoholic hepatitis/alcoholic cirrhosis s/p TIPS (8/2021) who presents for 6 month follow up. Continues to do well. MELD has slightly increased from last appointment (9 ->10), though remains low. Confusion and somnolence resolved, but mild memory difficulty persists. Pt does not need to be on liver transplantation list as his MELD remains low and he remains well compensated.  Creatinine noted to be elevated again today, though stable. Given hx of MARYBEL requiring temporary dialysis, I recommended he follow up with primary care  physician to follow kidney function and for further workup. He is due for abdomen US w/ doppler in 1 month. Will order this today. Will continue [id number] appointments for HCC surveillance. Dr. Echavarria is retiring. Pt will be referred to associate provider for follow up appointment.    Plan  - Abd US w/ doppler   - Remove patient from liver transplant list   - RTC in 6 months w/ associate provider     Staffed with Dr. Brandin Echavarria.    Pricila Serna MD   Internal Medicine, PGY-1  Hepatology  [facility]    Attestation:  This patient has been seen and evaluated by me, Brandin Echavarria.  Discussed with the house staff team or resident(s) and agree with the findings and plan in this note.         Again, thank you for allowing me to participate in the care of your patient.        Sincerely,        Brandin Echavarria MD

## 2023-05-24 NOTE — LETTER
5/24/2023         RE: Christo Herrera  [address] [place] [address]      [facility] Hepatology    Follow-up Visit    Follow-up visit for alcoholic cirrhosis s/p TIPS    Subjective:  37 year old male w/ hx of alcoholic hepatitis/alcoholic cirrhosis s/p TIPS (8/2021) who presents for 6 month follow up. He did have hx of severe MARYBEL requiring temporary dialysis on 08/2020. On initial presentation pt's MELD score was low though he did have fluid retention. Since then his fluid retention has resolved. Today he is doing well. He is up to date on his vaccinations. Continues to take rifaximin and lactulose. Having 2-3 stools/day. Does report difficulty with memory, though much improved from prior. He has gained weight which was intentional. Reports he has been exercising and trying to gain muscle. Reports good appetite.     Patient denies jaundice, lower extremity edema, abdominal distension, lethargy or confusion. Patient denies melena, hematemesis or hematochezia. Patient denies fevers, sweats or chills.  Weight stable.        Medical hx Surgical hx   Past Medical History:   Diagnosis Date    Alcoholic cirrhosis of liver with ascites (H) 03/10/2020    Ascites     CKD (chronic kidney disease) stage 3, GFR 30-59 ml/min (H)     Essential (primary) hypertension 09/17/2020    GERD (gastroesophageal reflux disease)     HE (hepatic encephalopathy) (H)     History of blood transfusion     History of ESRD requiring dialysis 12/2019    Resolved    Restless legs syndrome (RLS)       Past Surgical History:   Procedure Laterality Date    COLONOSCOPY      [facility] 2020    DENTAL SURGERY  2014    wisdom teeth    GI SURGERY      IR PARACENTESIS  8/27/2021    IR TRANSVEN INTRAHEPATIC PORTOSYST REV  11/24/2021    IR TRANSVEN INTRAHEPATIC PORTOSYST SHUNT  8/27/2021    US PARACENTESIS WITH ALBUMIN  04/02/2020    US PARACENTESIS WITH ALBUMIN  04/21/2020    US PARACENTESIS WITH ALBUMIN  06/04/2020    US PARACENTESIS WITH  ALBUMIN  06/19/2020    US PARACENTESIS WITH ALBUMIN  07/29/2020    US PARACENTESIS WITH ALBUMIN  10/05/2020    US PARACENTESIS WITH ALBUMIN  11/03/2020    US PARACENTESIS WITH ALBUMIN  11/11/2020    US PARACENTESIS WITH ALBUMIN  11/24/2020    US PARACENTESIS WITH ALBUMIN  12/03/2020    US PARACENTESIS WITH ALBUMIN  12/11/2020    US PARACENTESIS WITH ALBUMIN  12/18/2020    US PARACENTESIS WITH ALBUMIN  12/30/2020    US PARACENTESIS WITH ALBUMIN  01/07/2021    US PARACENTESIS WITH ALBUMIN  01/18/2021    US PARACENTESIS WITH ALBUMIN  01/27/2021    US PARACENTESIS WITH ALBUMIN  02/05/2021    US PARACENTESIS WITH ALBUMIN  02/16/2021    US PARACENTESIS WITH ALBUMIN  02/26/2021    US PARACENTESIS WITH ALBUMIN  03/12/2021    US PARACENTESIS WITH ALBUMIN  03/22/2021    US PARACENTESIS WITH ALBUMIN  04/01/2021    US PARACENTESIS WITH ALBUMIN  04/12/2021    US PARACENTESIS WITH ALBUMIN  04/23/2021    US PARACENTESIS WITH ALBUMIN  05/04/2021    US PARACENTESIS WITH ALBUMIN  05/14/2021    US PARACENTESIS WITH ALBUMIN  05/24/2021    US PARACENTESIS WITH ALBUMIN  06/03/2021    US PARACENTESIS WITH ALBUMIN  06/14/2021    US PARACENTESIS WITH ALBUMIN  06/25/2021    US PARACENTESIS WITH ALBUMIN  07/05/2021          Medications  Prior to Admission medications    Medication Sig Start Date End Date Taking? Authorizing Provider   acetaminophen (TYLENOL) 325 MG tablet Take 325-650 mg by mouth every 6 hours as needed for mild pain   Yes Reported, Patient   lactulose (CHRONULAC) 10 GM/15ML solution Take 10-15 mLs by mouth 2 times daily 2/15/20  Yes Reported, Patient   Menthol-Methyl Salicylate (ICY HOT EXTRA STRENGTH) 10-30 % CREA Apply topically daily as needed    Yes Reported, Patient   Multiple Vitamins-Minerals (SUPER THERA PRERNA M) TABS Take 1 tablet by mouth every morning  12/29/19  Yes Reported, Patient   Psyllium 58.12 % PACK Take by mouth daily as needed    Yes Reported, Patient   XIFAXAN 550 MG TABS tablet TAKE 1 TABLET(550 MG) BY  MOUTH TWICE DAILY 5/31/22  Yes Brandin Echavarria MD   furosemide (LASIX) 20 MG tablet TAKE 1/2 TABLET(10 MG) BY MOUTH DAILY  Patient not taking: Reported on 5/24/2023 11/17/22   Brandin Echavarria MD   gabapentin (NEURONTIN) 100 MG capsule Take 100 mg by mouth nightly as needed  8/11/20   Reported, Patient   guaiFENesin (MUCINEX) 600 MG 12 hr tablet Take 600 mg by mouth 2 times daily as needed for congestion or cough  2/15/20   Reported, Patient   midodrine (PROAMATINE) 5 MG tablet Take 2.5 mg by mouth 3 times daily as needed  Patient not taking: Reported on 5/24/2023 2/15/20   Reported, Patient   ondansetron (ZOFRAN) 8 MG tablet Take 0.5 tablets (4 mg) by mouth every 8 hours as needed for nausea 8/27/21   Av Waldron MD   spironolactone (ALDACTONE) 25 MG tablet TAKE 1 TABLET(25 MG) BY MOUTH DAILY 11/17/22   Brandin Echavarria MD       Allergies  Allergies   Allergen Reactions    Blood Transfusion Related (Informational Only) Other (See Comments)     Patient has a history of a clinically significant antibody against RBC antigens.  A delay in compatible RBCs may occur.        Review of systems  A 10-point review of systems was negative    Examination  /87   Pulse 69   Temp 97.7  F (36.5  C) (Oral)   Wt 67.9 kg (149 lb 12.8 oz)   SpO2 97%   BMI 23.46 kg/m    Body mass index is 23.46 kg/m .    Gen- well, NAD, A+Ox3, normal color  RS- normal work of breathing, on RA   Abd- soft, ND, NT, healed surgical scars present, stretch marks present   Extr- hands normal, no LARISSA  Skin- no rash or jaundice  Neuro- no asterixis  Psych- normal mood    Laboratory  Lab Results   Component Value Date     05/24/2023     01/12/2021    POTASSIUM 4.7 05/24/2023    POTASSIUM 4.3 04/26/2022    POTASSIUM 5.4 01/12/2021    CHLORIDE 107 05/24/2023    CHLORIDE 108 04/26/2022    CHLORIDE 108 01/12/2021    CO2 26 05/24/2023    CO2 22 04/26/2022    CO2 23 01/12/2021    BUN 25.1 05/24/2023     BUN 22 04/26/2022    BUN 48 01/12/2021    CR 1.21 05/24/2023    CR 1.39 01/12/2021       Lab Results   Component Value Date    BILITOTAL 0.9 05/24/2023    BILITOTAL 1.2 01/12/2021    ALT 63 05/24/2023    ALT 32 01/12/2021    AST 52 05/24/2023    AST 43 01/12/2021    ALKPHOS 202 05/24/2023    ALKPHOS 128 01/12/2021       Lab Results   Component Value Date    ALBUMIN 3.8 05/24/2023    ALBUMIN 3.6 04/26/2022    ALBUMIN 3.3 01/12/2021    PROTTOTAL 6.4 05/24/2023    PROTTOTAL 6.1 01/12/2021        Lab Results   Component Value Date    WBC 5.4 05/24/2023    WBC 4.9 01/12/2021    HGB 16.1 05/24/2023    HGB 13.4 01/12/2021    MCV 96 05/24/2023    MCV 95 01/12/2021     05/24/2023     01/12/2021       Lab Results   Component Value Date    INR 1.14 05/24/2023    INR 1.2 07/20/2021    INR 1.31 01/12/2021     MELD-Na score: 9 at 5/24/2023  7:52 AM  MELD score: 9 at 5/24/2023  7:52 AM  Calculated from:  Serum Creatinine: 1.21 mg/dL at 5/24/2023  7:52 AM  Serum Sodium: 140 mmol/L (Using max of 137 mmol/L) at 5/24/2023  7:52 AM  Total Bilirubin: 0.9 mg/dL (Using min of 1 mg/dL) at 5/24/2023  7:52 AM  INR(ratio): 1.14 at 5/24/2023  7:52 AM  Age: 37 years    Radiology    Assessment  37 year old male w/ hx of alcoholic hepatitis/alcoholic cirrhosis s/p TIPS (8/2021) who presents for 6 month follow up. Continues to do well. MELD has slightly increased from last appointment (9 ->10), though remains low. Confusion and somnolence resolved, but mild memory difficulty persists. Pt does not need to be on liver transplantation list as his MELD remains low and he remains well compensated.  Creatinine noted to be elevated again today, though stable. Given hx of MARYBEL requiring temporary dialysis, I recommended he follow up with primary care physician to follow kidney function and for further workup. He is due for abdomen US w/ doppler in 1 month. Will order this today. Will continue [id number] appointments for HCC surveillance.   Jericho is retiring. Pt will be referred to associate provider for follow up appointment.    Plan  - Abd US w/ doppler   - Remove patient from liver transplant list   - RTC in 6 months w/ associate provider     Staffed with Dr. Brandin Echavarria.    Pricila Serna MD   Internal Medicine, PGY-1  Hepatology  [facility]    Attestation:  This patient has been seen and evaluated by me, Brandin Echavarria.  Discussed with the house staff team or resident(s) and agree with the findings and plan in this note.           Brandin Echavarria MD

## 2023-05-24 NOTE — PROGRESS NOTES
[facility] Hepatology    Follow-up Visit    Follow-up visit for alcoholic cirrhosis s/p TIPS    Subjective:  37 year old male w/ hx of alcoholic hepatitis/alcoholic cirrhosis s/p TIPS (8/2021) who presents for 6 month follow up. He did have hx of severe MARYBEL requiring temporary dialysis on 08/2020. On initial presentation pt's MELD score was low though he did have fluid retention. Since then his fluid retention has resolved. Today he is doing well. He is up to date on his vaccinations. Continues to take rifaximin and lactulose. Having 2-3 stools/day. Does report difficulty with memory, though much improved from prior. He has gained weight which was intentional. Reports he has been exercising and trying to gain muscle. Reports good appetite.     Patient denies jaundice, lower extremity edema, abdominal distension, lethargy or confusion. Patient denies melena, hematemesis or hematochezia. Patient denies fevers, sweats or chills.  Weight stable.        Medical hx Surgical hx   Past Medical History:   Diagnosis Date     Alcoholic cirrhosis of liver with ascites (H) 03/10/2020     Ascites      CKD (chronic kidney disease) stage 3, GFR 30-59 ml/min (H)      Essential (primary) hypertension 09/17/2020     GERD (gastroesophageal reflux disease)      HE (hepatic encephalopathy) (H)      History of blood transfusion      History of ESRD requiring dialysis 12/2019    Resolved     Restless legs syndrome (RLS)       Past Surgical History:   Procedure Laterality Date     COLONOSCOPY      [facility] 2020     DENTAL SURGERY  2014    wisdom teeth     GI SURGERY       IR PARACENTESIS  8/27/2021     IR TRANSVEN INTRAHEPATIC PORTOSYST REV  11/24/2021     IR TRANSVEN INTRAHEPATIC PORTOSYST SHUNT  8/27/2021     US PARACENTESIS WITH ALBUMIN  04/02/2020     US PARACENTESIS WITH ALBUMIN  04/21/2020     US PARACENTESIS WITH ALBUMIN  06/04/2020     US PARACENTESIS WITH ALBUMIN  06/19/2020     US PARACENTESIS WITH ALBUMIN  07/29/2020      US PARACENTESIS WITH ALBUMIN  10/05/2020     US PARACENTESIS WITH ALBUMIN  11/03/2020     US PARACENTESIS WITH ALBUMIN  11/11/2020     US PARACENTESIS WITH ALBUMIN  11/24/2020     US PARACENTESIS WITH ALBUMIN  12/03/2020     US PARACENTESIS WITH ALBUMIN  12/11/2020     US PARACENTESIS WITH ALBUMIN  12/18/2020     US PARACENTESIS WITH ALBUMIN  12/30/2020     US PARACENTESIS WITH ALBUMIN  01/07/2021     US PARACENTESIS WITH ALBUMIN  01/18/2021     US PARACENTESIS WITH ALBUMIN  01/27/2021     US PARACENTESIS WITH ALBUMIN  02/05/2021     US PARACENTESIS WITH ALBUMIN  02/16/2021     US PARACENTESIS WITH ALBUMIN  02/26/2021     US PARACENTESIS WITH ALBUMIN  03/12/2021     US PARACENTESIS WITH ALBUMIN  03/22/2021     US PARACENTESIS WITH ALBUMIN  04/01/2021     US PARACENTESIS WITH ALBUMIN  04/12/2021     US PARACENTESIS WITH ALBUMIN  04/23/2021     US PARACENTESIS WITH ALBUMIN  05/04/2021     US PARACENTESIS WITH ALBUMIN  05/14/2021     US PARACENTESIS WITH ALBUMIN  05/24/2021     US PARACENTESIS WITH ALBUMIN  06/03/2021     US PARACENTESIS WITH ALBUMIN  06/14/2021     US PARACENTESIS WITH ALBUMIN  06/25/2021     US PARACENTESIS WITH ALBUMIN  07/05/2021          Medications  Prior to Admission medications    Medication Sig Start Date End Date Taking? Authorizing Provider   acetaminophen (TYLENOL) 325 MG tablet Take 325-650 mg by mouth every 6 hours as needed for mild pain   Yes Reported, Patient   lactulose (CHRONULAC) 10 GM/15ML solution Take 10-15 mLs by mouth 2 times daily 2/15/20  Yes Reported, Patient   Menthol-Methyl Salicylate (ICY HOT EXTRA STRENGTH) 10-30 % CREA Apply topically daily as needed    Yes Reported, Patient   Multiple Vitamins-Minerals (SUPER THERA PRERNA M) TABS Take 1 tablet by mouth every morning  12/29/19  Yes Reported, Patient   Psyllium 58.12 % PACK Take by mouth daily as needed    Yes Reported, Patient   XIFAXAN 550 MG TABS tablet TAKE 1 TABLET(550 MG) BY MOUTH TWICE DAILY 5/31/22  Yes [place]  Brandin FONSECA MD   furosemide (LASIX) 20 MG tablet TAKE 1/2 TABLET(10 MG) BY MOUTH DAILY  Patient not taking: Reported on 5/24/2023 11/17/22   Brandin Echavarria MD   gabapentin (NEURONTIN) 100 MG capsule Take 100 mg by mouth nightly as needed  8/11/20   Reported, Patient   guaiFENesin (MUCINEX) 600 MG 12 hr tablet Take 600 mg by mouth 2 times daily as needed for congestion or cough  2/15/20   Reported, Patient   midodrine (PROAMATINE) 5 MG tablet Take 2.5 mg by mouth 3 times daily as needed  Patient not taking: Reported on 5/24/2023 2/15/20   Reported, Patient   ondansetron (ZOFRAN) 8 MG tablet Take 0.5 tablets (4 mg) by mouth every 8 hours as needed for nausea 8/27/21   Av Waldron MD   spironolactone (ALDACTONE) 25 MG tablet TAKE 1 TABLET(25 MG) BY MOUTH DAILY 11/17/22   Brandin Echavarria MD       Allergies  Allergies   Allergen Reactions     Blood Transfusion Related (Informational Only) Other (See Comments)     Patient has a history of a clinically significant antibody against RBC antigens.  A delay in compatible RBCs may occur.        Review of systems  A 10-point review of systems was negative    Examination  /87   Pulse 69   Temp 97.7  F (36.5  C) (Oral)   Wt 67.9 kg (149 lb 12.8 oz)   SpO2 97%   BMI 23.46 kg/m    Body mass index is 23.46 kg/m .    Gen- well, NAD, A+Ox3, normal color  RS- normal work of breathing, on RA   Abd- soft, ND, NT, healed surgical scars present, stretch marks present   Extr- hands normal, no LARISSA  Skin- no rash or jaundice  Neuro- no asterixis  Psych- normal mood    Laboratory  Lab Results   Component Value Date     05/24/2023     01/12/2021    POTASSIUM 4.7 05/24/2023    POTASSIUM 4.3 04/26/2022    POTASSIUM 5.4 01/12/2021    CHLORIDE 107 05/24/2023    CHLORIDE 108 04/26/2022    CHLORIDE 108 01/12/2021    CO2 26 05/24/2023    CO2 22 04/26/2022    CO2 23 01/12/2021    BUN 25.1 05/24/2023    BUN 22 04/26/2022    BUN 48  01/12/2021    CR 1.21 05/24/2023    CR 1.39 01/12/2021       Lab Results   Component Value Date    BILITOTAL 0.9 05/24/2023    BILITOTAL 1.2 01/12/2021    ALT 63 05/24/2023    ALT 32 01/12/2021    AST 52 05/24/2023    AST 43 01/12/2021    ALKPHOS 202 05/24/2023    ALKPHOS 128 01/12/2021       Lab Results   Component Value Date    ALBUMIN 3.8 05/24/2023    ALBUMIN 3.6 04/26/2022    ALBUMIN 3.3 01/12/2021    PROTTOTAL 6.4 05/24/2023    PROTTOTAL 6.1 01/12/2021        Lab Results   Component Value Date    WBC 5.4 05/24/2023    WBC 4.9 01/12/2021    HGB 16.1 05/24/2023    HGB 13.4 01/12/2021    MCV 96 05/24/2023    MCV 95 01/12/2021     05/24/2023     01/12/2021       Lab Results   Component Value Date    INR 1.14 05/24/2023    INR 1.2 07/20/2021    INR 1.31 01/12/2021     MELD-Na score: 9 at 5/24/2023  7:52 AM  MELD score: 9 at 5/24/2023  7:52 AM  Calculated from:  Serum Creatinine: 1.21 mg/dL at 5/24/2023  7:52 AM  Serum Sodium: 140 mmol/L (Using max of 137 mmol/L) at 5/24/2023  7:52 AM  Total Bilirubin: 0.9 mg/dL (Using min of 1 mg/dL) at 5/24/2023  7:52 AM  INR(ratio): 1.14 at 5/24/2023  7:52 AM  Age: 37 years    Radiology    Assessment  37 year old male w/ hx of alcoholic hepatitis/alcoholic cirrhosis s/p TIPS (8/2021) who presents for 6 month follow up. Continues to do well. MELD has slightly increased from last appointment (9 ->10), though remains low. Confusion and somnolence resolved, but mild memory difficulty persists. Pt does not need to be on liver transplantation list as his MELD remains low and he remains well compensated.  Creatinine noted to be elevated again today, though stable. Given hx of MARYBEL requiring temporary dialysis, I recommended he follow up with primary care physician to follow kidney function and for further workup. He is due for abdomen US w/ doppler in 1 month. Will order this today. Will continue [id number] appointments for HCC surveillance. Dr. Echavarria is retiring. Pt will be  referred to associate provider for follow up appointment.    Plan  - Abd US w/ doppler   - Remove patient from liver transplant list   - RTC in 6 months w/ associate provider     Staffed with Dr. Brandin Echavarria.    Pricila Serna MD   Internal Medicine, PGY-1  Hepatology  [facility]    Attestation:  This patient has been seen and evaluated by me, Brandin Echavarria.  Discussed with the house staff team or resident(s) and agree with the findings and plan in this note.

## 2023-05-24 NOTE — NURSING NOTE
Chief Complaint   Patient presents with     RECHECK     6 mo f/u       /87   Pulse 69   Temp 97.7  F (36.5  C) (Oral)   Wt 67.9 kg (149 lb 12.8 oz)   SpO2 97%   BMI 23.46 kg/m      Star Copeland on 5/24/2023 at 8:13 AM

## 2023-05-26 LAB
LABORATORY REPORT: NORMAL
PLPETH BLD-MCNC: <10 NG/ML
POPETH BLD-MCNC: <10 NG/ML

## 2023-05-30 ENCOUNTER — COMMITTEE REVIEW (OUTPATIENT)
Dept: TRANSPLANT | Facility: CLINIC | Age: 38
End: 2023-05-30
Payer: MEDICARE

## 2023-05-30 DIAGNOSIS — K70.30 ALCOHOLIC CIRRHOSIS (H): Primary | ICD-10-CM

## 2023-05-30 NOTE — LETTER
May 31, 2023    Christo Herrera  [address]  [place] [address]    Dear Mr. Herrera,    The purpose of this letter is to let you know the [facility] Multi-Disciplinary Selection Team made the decision to remove you from the liver transplant list. This is because your liver has recovered to the point that transplant is not indicated at this time.   Important things you should know:  If you would like to discuss the decision, or if your medical status changes, you may call [phone] and ask to speak to your transplant coordinator.  We recommend that you continue to follow up with your primary care and referring physicians in order to manage your health concerns.  You should continue to follow up in our liver clinic, with your next appt on 1/23/24 with Dr. Sherry Cormier (Dr. Echavarria is retiring in June).   We want you to know that our program has physician and surgeon coverage 24 hours a day, 365 days a year.   Attached is a letter from [facility] that describes the services and information offered to patients by [facility] and the Organ Procurement and Transplantation Network (OPTN).  Thank you for allowing us to participate in your care.  We wish you well.  Sincerely,  Christo Alcala Jr., BSN, RN  Liver Transplant Coordinator  [phone]  Liver Transplant Team  Enclosure: OS Letter  CC:   care team            The Organ Procurement and Transplantation Network  Toll-free patient services line:     Your resource for organ transplant information    If you have a question regarding your own medical care, you always should call your transplant hospital first. However, for general organ transplant-related information, you can call the Organ Procurement and Transplantation Network (OPTN) toll-free patient services line at [phone]- [phone]. Anyone, including potential transplant candidates, candidates, recipients, family members, friends, living donors, and donor family members, can call this number to:      Talk  about organ donation, living donation, the transplant process, the donation process, and transplant policies.  Get a free patient information kit with helpful booklets, waiting list and transplant information, and a list of all transplant hospitals.  Ask questions about the OPTN website (https://optn.transplant.hrsa.gov/), the United Network for Organ Sharing s (UNOS) website (https://unos.org/), or the UNOS website for living donors and transplant recipients. (https://www.transplantliving.org/).  Learn how the OPTN can help you.  Talk about any concerns that you may have with a transplant hospital.    The [facility] transplant system, the OPTN, is managed under federal contract by the United Network for Organ Sharing (UNOS), which is a non-profit charitable organization. The OPTN helps create and define organ sharing policies that make the best use of donated organs. This process continuously evaluating new advances and discoveries so policies can be adapted to best serve patients waiting for transplants. To do so, the OPTN works closely with transplant professionals, transplant patients, transplant candidates, donor families, living donors, and the public. All transplant programs and organ procurement organizations throughout the country are OPTN members and are obligated to follow the policies the OPTN creates for allocating organs.    The OPTN also is responsible for:    Providing educational material for patients, the public, and professionals.  Raising awareness of the need for donated organs and tissue.  Coordinating organ procurement, matching, and placement.  Collecting information about every organ transplant and donation that occurs in the [place].    Remember, you should contact your transplant hospital directly if you have questions or concerns about your own medical care including medical records, work-up progress, and test results.    We are not your transplant hospital, and our staff will not be  able to answer questions about your case, so please keep your transplant hospital s phone number handy.    However, while you research your transplant needs and learn as much as you can about transplantation and donation, we welcome your call to our toll-free patient services line at [phone].          Updated 4/1/2019

## 2023-05-30 NOTE — COMMITTEE REVIEW
Abdominal Committee Review Note     Evaluation Date: 3/10/2020  Committee Review Date: 5/30/2023    Organ being evaluated for: Liver    Transplant Phase: Waitlist  Transplant Status: Active    Transplant Coordinator: Christo Alcala Jr.  Transplant Surgeon:   Josr Broderick    Referring Physician: Luda Caraballo    Primary Diagnosis: Alcoholic Cirrhosis  Secondary Diagnosis:     Committee Review Members:  Nutrition Chanel Cardenas, RD   Pharmacist Joanne Ngo, [facility]    - Clinical Oneal Lopez, KAYA, Arcelia Heaton, [facility]   Transplant Elena Green, HARSHAD, Nancy Genao LPN, Sylvia Gary, RN, Janine Dockery, HARSHAD, Jr Christo Alcala, HARSHAD, Savita Oconnell, APRN CNP, Oral Rodriguez, HARSHAD, Lilliam Deal, HARSHAD   Transplant Hepatology  Fang Robertson MD, Félix Thomas MD, Sherry Cormier MD, Karen Alicia MD, Brandin Echavarria MD, Misha Bowen MD, Thomas M. Leventhal, MD, Jesse Ontiveros MD   Transplant Surgery Josr Broderick MD       Transplant Eligibility: Cirrhosis with MELD, ETOH    Committee Review Decision: Remove    Relative Contraindications: None    Absolute Contraindications: None, Other, too well    Committee Chair Leventhal, Thomas Michael, MD verbally attested to the committee's decision.    Committee Discussion Details:      Remove from list - doing too well and does not require transplant at this time (MELD 9 5/24/23)    Close kidney evaluation as well      May 31, 2023 11:06 AM - Christo Alcala Jr., RN:   Called patient and LVM to call back and discuss.

## 2023-06-08 DIAGNOSIS — K76.82 HEPATIC ENCEPHALOPATHY (H): ICD-10-CM

## 2023-06-08 RX ORDER — RIFAXIMIN 550 MG/1
TABLET ORAL
Qty: 60 TABLET | Refills: 11 | Status: SHIPPED | OUTPATIENT
Start: 2023-06-08 | End: 2023-10-31

## 2023-06-16 ENCOUNTER — ANCILLARY PROCEDURE (OUTPATIENT)
Dept: ULTRASOUND IMAGING | Facility: CLINIC | Age: 38
End: 2023-06-16
Attending: INTERNAL MEDICINE
Payer: MEDICARE

## 2023-06-16 DIAGNOSIS — K70.31 ALCOHOLIC CIRRHOSIS OF LIVER WITH ASCITES (H): ICD-10-CM

## 2023-06-16 PROCEDURE — 93975 VASCULAR STUDY: CPT | Mod: GC | Performed by: STUDENT IN AN ORGANIZED HEALTH CARE EDUCATION/TRAINING PROGRAM

## 2023-06-16 PROCEDURE — 76700 US EXAM ABDOM COMPLETE: CPT | Mod: GC | Performed by: STUDENT IN AN ORGANIZED HEALTH CARE EDUCATION/TRAINING PROGRAM

## 2023-06-21 ENCOUNTER — DOCUMENTATION ONLY (OUTPATIENT)
Dept: OTHER | Facility: CLINIC | Age: 38
End: 2023-06-21
Payer: MEDICARE

## 2023-06-21 ENCOUNTER — DOCUMENTATION ONLY (OUTPATIENT)
Dept: TRANSPLANT | Facility: CLINIC | Age: 38
End: 2023-06-21
Payer: MEDICARE

## 2023-06-21 ENCOUNTER — TELEPHONE (OUTPATIENT)
Dept: GASTROENTEROLOGY | Facility: CLINIC | Age: 38
End: 2023-06-21
Payer: MEDICARE

## 2023-06-21 PROBLEM — Z71.89 ACP (ADVANCE CARE PLANNING): Status: RESOLVED | Noted: 2020-01-24 | Resolved: 2023-06-21

## 2023-06-21 NOTE — TELEPHONE ENCOUNTER
Prior Authorization Retail Medication Request    Medication/Dose: XIFAXAN 550 MG TABS tablet  ICD code (if different than what is on RX):  Hepatic encephalopathy (H) [K76.82]   Previously Tried and Failed:    Rationale:      Insurance Name: [employer]  Insurance ID:  [id number]    Pharmacy Information (if different than what is on RX)  Name:  [facility] #[id number] [place], [place] - [address] [place] [address]  Phone:  [phone]

## 2023-06-21 NOTE — PROGRESS NOTES
Health Care Directive received from clinic staff and this document was forwarded to [facility].  Original document mailed back to patient.    Caregiver Agreement for Liver Transplantation also received naming the following care givers:  Sherry Moreira [phone]  Nelson Herrera [phone]  Emiliano Herrera [phone]  **of note patient was removed from our wait list because his condition improved.    KAYA Lacey, [facility]  Liver Transplant   Phone [phone]  Pager [phone]

## 2023-06-22 NOTE — IR NOTE
3800 mL of ascites fluid drained.  
Patient Name: Christo Herrera  Medical Record Number: [id number]  Today's Date: 8/27/2021    Procedure: TIPS and Paracentesis   Proceduralist: Yovani Pizarro MD; Av Waldron MD (On-Call Pager #[phone])    Patient in room: 0750  Procedure Start: 0837  Procedure end: 1140  Sedation medications administered: General Anesthesia    Report given to: PACU RN by CRNA  : n/a    Other Notes: Pt arrived to IR room 4 from OR Beds. Consent reviewed. Pt denies any questions or concerns regarding procedure. Pt positioned supine and monitored per protocol. Pt tolerated procedure without any noted complications. Pt transferred to PACU.    Access obtained through R IJ. Manual pressure used for closure.    
Pre-stent    Right Atirum: 10  Portal Vein: 28    Post-stent    Portal Vein: 24  Portal Venous end of stent: 23  Mid-stent: 22  Hepatic venous end of stent: 20  Inferior Vena Cava: 18  Right Atrium: 15  
1.94

## 2023-06-27 NOTE — TELEPHONE ENCOUNTER
Prior Authorization Approval    Medication: XIFAXAN 550 MG PO TABS  Authorization Effective Date: 5/28/2023  Authorization Expiration Date: 12/30/2023  Approved Dose/Quantity:   Reference #:     Insurance Company: [employer] - [employer] [phone] Fax [phone]  Expected CoPay:       CoPay Card Available:      Financial Assistance Needed:   Which Pharmacy is filling the prescription: [id number] [address]  Pharmacy Notified: Yes  Patient Notified: Yes **Instructed pharmacy to notify patient when script is ready to /ship.**

## 2023-06-27 NOTE — TELEPHONE ENCOUNTER
[place] Initiation    Medication: XIFAXAN 550 MG PO TABS  Insurance Company: [employer] - Phone [phone] Fax [phone]  Pharmacy Filling the Rx: [id number] [address]  Filling Pharmacy Phone: [phone]  Filling Pharmacy Fax: [phone]  Start Date: 6/27/2023

## 2023-10-31 ENCOUNTER — VIRTUAL VISIT (OUTPATIENT)
Dept: GASTROENTEROLOGY | Facility: CLINIC | Age: 38
End: 2023-10-31
Attending: INTERNAL MEDICINE
Payer: MEDICARE

## 2023-10-31 DIAGNOSIS — K70.31 ALCOHOLIC CIRRHOSIS OF LIVER WITH ASCITES (H): Primary | ICD-10-CM

## 2023-10-31 DIAGNOSIS — K76.82 HEPATIC ENCEPHALOPATHY (H): ICD-10-CM

## 2023-10-31 DIAGNOSIS — Z95.828 S/P TIPS (TRANSJUGULAR INTRAHEPATIC PORTOSYSTEMIC SHUNT): ICD-10-CM

## 2023-10-31 PROCEDURE — 99214 OFFICE O/P EST MOD 30 MIN: CPT | Mod: VID | Performed by: INTERNAL MEDICINE

## 2023-10-31 RX ORDER — LACTULOSE 10 G/15ML
10-15 SOLUTION ORAL 2 TIMES DAILY
Qty: 946 ML | Refills: 11 | Status: SHIPPED | OUTPATIENT
Start: 2023-10-31

## 2023-10-31 ASSESSMENT — PAIN SCALES - GENERAL: PAINLEVEL: SEVERE PAIN (7)

## 2023-10-31 NOTE — NURSING NOTE
Is the patient currently in the state of [place]? YES    Visit mode:VIDEO    If the visit is dropped, the patient can be reconnected by: VIDEO VISIT: Text to cell phone:   Telephone Information:   Mobile [phone]       Will anyone else be joining the visit? NO  (If patient encounters technical issues they should call [phone])    How would you like to obtain your AVS? MyChart    Are changes needed to the allergy or medication list? Pt stated no changes to allergies and Pt stated no med changes    Reason for visit: DOUGLAS DOOLEY

## 2023-10-31 NOTE — PROGRESS NOTES
A/P  Mr. Herrera is a 38 Y M with alcohol related cirrhosis c/b refractory ascites, resolved with TIPS placed 8/2021. He is doing very well, with no ascites, no diuretics, low MELD. Continues on medications for HE, which is well controlled. Tomorrow will chikis his four years of sobriety  MELD 9, ABO    Ventral hernia discussed indication for surgery. He does not have any at this time. Continue wearing support when he exercises.  Ascites Resolved S/P TIPs. US 6/2023 TIPS patent. No fluid.  Off diuretics.  HE Controlled with lactulose and rifaximin. Continue these medications, renewed.  CKD Was on HD for periods of time. None for a couple of years. Mildly impaired kidney function intermittently. Current GFR 80.  HCC Screening UTD US 6/2023. No mass. Due December. Ordered  AUD Severe, in sustained remission. Last alcohol was 11/1/2019.  Bone Health No record of DEXA. Will address at next visit.    Labs and US every 6 mo. Ordered  RTC 6-12 mo in person or video.    Sherry Cormier MD  Hepatology/Liver Transplant  Medical Director, Liver Transplantation  [facility]  ==================================================================      Subjective: 38 year old male with alcohol related  cirrhosis, previously seen by Dr. Echavarria. Cirrhosis c/b ascites, resolved with TIPS 2021, and HE controlled on medications.    S/p TIPS 8/2021 for refractory ascites, His fluid retention has resolved and he is off diuretics for over a year..     He was in eval for [facility] and this was closed in May 2023 as he was doing well and did not require transplant. MELD was 9. He has not had labs since.    He states is doing better, joined a gym and is keeping weight on Mentally his memory seems better    ASCITES resolved and no longer on diuretics. Last US 6/2023 no fluid    HE Continues to take rifaximin and lactulose. Takes lactulose 2-3 times per day. Having 2-3 stools/day. Does report difficulty with memory, though much improved  from prior.   on rifaximin and lactulose.     Variceal screening Last EGD 2020  HCC screening US 6/16/23    AUD Last alcohol 11/1/19    Lab Test 05/24/23  0752   PROTTOTAL 6.4   ALBUMIN 3.8   BILITOTAL 0.9   ALKPHOS 202*   AST 52*   ALT 63*     Lab Test 05/24/23  0752   WBC 5.4   RBC 5.19   HGB 16.1   HCT 49.6   MCV 96   MCH 31.0   MCHC 32.5   RDW 13.1   *   PMH  CKD was on HD in 2020  GERD  RLS  SHX  Does not work. Last worked [employer] to get [employer]  Lives with his mother who is a significant support for him.  Also has brother and father close by     Current Outpatient Medications   Medication     acetaminophen (TYLENOL) 325 MG tablet     furosemide (LASIX) 20 MG tablet     gabapentin (NEURONTIN) 100 MG capsule     guaiFENesin (MUCINEX) 600 MG 12 hr tablet     lactulose (CHRONULAC) 10 GM/15ML solution     Menthol-Methyl Salicylate (ICY HOT EXTRA STRENGTH) 10-30 % CREA     midodrine (PROAMATINE) 5 MG tablet     Multiple Vitamins-Minerals (SUPER THERA PRERNA M) TABS     ondansetron (ZOFRAN) 8 MG tablet     Psyllium 58.12 % PACK     spironolactone (ALDACTONE) 25 MG tablet     XIFAXAN 550 MG TABS tablet     No current facility-administered medications for this visit.   Exam  Gen Alert pleasant NAD  Resp No difficulty breathing. No cough  Skin No Jaundice  Eyes No icterus  Neuro HERNÁNDEZ  MSK no muscle wasting  Psyche Pleasant, appropriate. Well groomed.  Christo Herrera is a 38 year old male who is being evaluated via a billable video visit.    Video-Visit Details  Type of service:  Video Visit  Video Start Time: 944  Video End Time: 1015  Originating Location (pt. Location):home  Distant Location (provider location):  [facility] [place]      Platform used for Video Visit: [employer] or [employer] Workflow Management

## 2023-10-31 NOTE — LETTER
10/31/2023         RE: Christo Herrera  [address] [place] [address]        Dear Colleague,    Thank you for referring your patient, Christo Herrera, to the [facility] HEPATOLOGY [facility]INIC [place]. Please see a copy of my visit note below.    A/P  Mr. Herrera is a 38 Y M with alcohol related cirrhosis c/b refractory ascites, resolved with TIPS placed 8/2021. He is doing very well, with no ascites, no diuretics, low MELD. Continues on medications for HE, which is well controlled. Tomorrow will chikis his four years of sobriety  MELD 9, ABO    Ventral hernia discussed indication for surgery. He does not have any at this time. Continue wearing support when he exercises.  Ascites Resolved S/P TIPs. US 6/2023 TIPS patent. No fluid.  Off diuretics.  HE Controlled with lactulose and rifaximin. Continue these medications, renewed.  CKD Was on HD for periods of time. None for a couple of years. Mildly impaired kidney function intermittently. Current GFR 80.  HCC Screening UTD US 6/2023. No mass. Due December. Ordered  AUD Severe, in sustained remission. Last alcohol was 11/1/2019.  Bone Health No record of DEXA. Will address at next visit.    Labs and US every 6 mo. Ordered  RTC 6-12 mo in person or video.    Sherry Cormier MD  Hepatology/Liver Transplant  Medical Director, Liver Transplantation  [facility]  ==================================================================      Subjective: 38 year old male with alcohol related  cirrhosis, previously seen by Dr. Echavarria. Cirrhosis c/b ascites, resolved with TIPS 2021, and HE controlled on medications.    S/p TIPS 8/2021 for refractory ascites, His fluid retention has resolved and he is off diuretics for over a year..     He was in eval for SLK and this was closed in May 2023 as he was doing well and did not require transplant. MELD was 9. He has not had labs since.    He states is doing better, joined a gym and is keeping weight on  Mentally his memory seems better    ASCITES resolved and no longer on diuretics. Last US 6/2023 no fluid    HE Continues to take rifaximin and lactulose. Takes lactulose 2-3 times per day. Having 2-3 stools/day. Does report difficulty with memory, though much improved from prior.   on rifaximin and lactulose.     Variceal screening Last EGD 2020  HCC screening US 6/16/23    AUD Last alcohol 11/1/19    Lab Test 05/24/23  0752   PROTTOTAL 6.4   ALBUMIN 3.8   BILITOTAL 0.9   ALKPHOS 202*   AST 52*   ALT 63*     Lab Test 05/24/23  0752   WBC 5.4   RBC 5.19   HGB 16.1   HCT 49.6   MCV 96   MCH 31.0   MCHC 32.5   RDW 13.1   *   PMH  CKD was on HD in 2020  GERD  RLS  SHX  Does not work. Last worked [employer] to get [employer]  Lives with his mother who is a significant support for him.  Also has brother and father close by     Current Outpatient Medications   Medication     acetaminophen (TYLENOL) 325 MG tablet     furosemide (LASIX) 20 MG tablet     gabapentin (NEURONTIN) 100 MG capsule     guaiFENesin (MUCINEX) 600 MG 12 hr tablet     lactulose (CHRONULAC) 10 GM/15ML solution     Menthol-Methyl Salicylate (ICY HOT EXTRA STRENGTH) 10-30 % CREA     midodrine (PROAMATINE) 5 MG tablet     Multiple Vitamins-Minerals (SUPER THERA PRERNA M) TABS     ondansetron (ZOFRAN) 8 MG tablet     Psyllium 58.12 % PACK     spironolactone (ALDACTONE) 25 MG tablet     XIFAXAN 550 MG TABS tablet     No current facility-administered medications for this visit.   Exam  Gen Alert pleasant NAD  Resp No difficulty breathing. No cough  Skin No Jaundice  Eyes No icterus  Neuro HERNÁNDEZ  MSK no muscle wasting  Psyche Pleasant, appropriate. Well groomed.  Christo Herrera is a 38 year old male who is being evaluated via a billable video visit.    Video-Visit Details  Type of service:  Video Visit  Video Start Time: 944  Video End Time: 1015  Originating Location (pt. Location):home  Distant Location (provider location):  [facility]  HEPATOLOGY CLINIC [place]      Platform used for Video Visit: Barrett or Osvaldo        Again, thank you for allowing me to participate in the care of your patient.        Sincerely,        Sherry Cormier MD

## 2023-10-31 NOTE — PROGRESS NOTES
"Virtual Visit Details    Type of service:  Video Visit   Video Start Time: {video visit start/end time for provider to select:876310}  Video End Time:{video visit start/end time for provider to select:431378}    Originating Location (pt. Location): {video visit patient location:534692::\"Home\"}  {PROVIDER LOCATION On-site should be selected for visits conducted from your clinic location or adjoining [facility] hospital, academic office, or other nearby [facility] building. Off-site should be selected for all other provider locations, including home:[address]}  Distant Location (provider location):  {virtual location provider:115401}  Platform used for Video Visit: {Virtual Visit Platforms:995392::\"Trendmeon\"}  "

## 2023-11-07 ENCOUNTER — IMMUNIZATION (OUTPATIENT)
Dept: FAMILY MEDICINE | Facility: CLINIC | Age: 38
End: 2023-11-07
Payer: MEDICARE

## 2023-11-07 PROCEDURE — G0008 ADMIN INFLUENZA VIRUS VAC: HCPCS

## 2023-11-07 PROCEDURE — 90686 IIV4 VACC NO PRSV 0.5 ML IM: CPT

## 2024-01-23 ENCOUNTER — LAB (OUTPATIENT)
Dept: LAB | Facility: CLINIC | Age: 39
End: 2024-01-23
Attending: INTERNAL MEDICINE
Payer: MEDICARE

## 2024-01-23 DIAGNOSIS — K70.30 ALCOHOLIC CIRRHOSIS (H): ICD-10-CM

## 2024-01-23 LAB
AFP SERPL-MCNC: 1.9 NG/ML
ALBUMIN SERPL BCG-MCNC: 3.9 G/DL (ref 3.5–5.2)
ALP SERPL-CCNC: 119 U/L (ref 40–150)
ALT SERPL W P-5'-P-CCNC: 45 U/L (ref 0–70)
ANION GAP SERPL CALCULATED.3IONS-SCNC: 8 MMOL/L (ref 7–15)
AST SERPL W P-5'-P-CCNC: 50 U/L (ref 0–45)
BILIRUB DIRECT SERPL-MCNC: 0.62 MG/DL (ref 0–0.3)
BILIRUB SERPL-MCNC: 1.5 MG/DL
BUN SERPL-MCNC: 21.5 MG/DL (ref 6–20)
CALCIUM SERPL-MCNC: 9.3 MG/DL (ref 8.6–10)
CHLORIDE SERPL-SCNC: 106 MMOL/L (ref 98–107)
CREAT SERPL-MCNC: 1.14 MG/DL (ref 0.67–1.17)
DEPRECATED HCO3 PLAS-SCNC: 25 MMOL/L (ref 22–29)
EGFRCR SERPLBLD CKD-EPI 2021: 85 ML/MIN/1.73M2
ERYTHROCYTE [DISTWIDTH] IN BLOOD BY AUTOMATED COUNT: 13.2 % (ref 10–15)
GLUCOSE SERPL-MCNC: 85 MG/DL (ref 70–99)
HCT VFR BLD AUTO: 48.1 % (ref 40–53)
HGB BLD-MCNC: 16 G/DL (ref 13.3–17.7)
INR PPP: 1.15 (ref 0.85–1.15)
MCH RBC QN AUTO: 31.2 PG (ref 26.5–33)
MCHC RBC AUTO-ENTMCNC: 33.3 G/DL (ref 31.5–36.5)
MCV RBC AUTO: 94 FL (ref 78–100)
PLATELET # BLD AUTO: 120 10E3/UL (ref 150–450)
POTASSIUM SERPL-SCNC: 4.8 MMOL/L (ref 3.4–5.3)
PROT SERPL-MCNC: 6.2 G/DL (ref 6.4–8.3)
RBC # BLD AUTO: 5.13 10E6/UL (ref 4.4–5.9)
SODIUM SERPL-SCNC: 139 MMOL/L (ref 135–145)
WBC # BLD AUTO: 5.7 10E3/UL (ref 4–11)

## 2024-01-23 PROCEDURE — 85027 COMPLETE CBC AUTOMATED: CPT | Performed by: PATHOLOGY

## 2024-01-23 PROCEDURE — 82105 ALPHA-FETOPROTEIN SERUM: CPT | Performed by: INTERNAL MEDICINE

## 2024-01-23 PROCEDURE — 80053 COMPREHEN METABOLIC PANEL: CPT | Performed by: PATHOLOGY

## 2024-01-23 PROCEDURE — 99000 SPECIMEN HANDLING OFFICE-LAB: CPT | Performed by: PATHOLOGY

## 2024-01-23 PROCEDURE — 85610 PROTHROMBIN TIME: CPT | Performed by: PATHOLOGY

## 2024-01-23 PROCEDURE — 82248 BILIRUBIN DIRECT: CPT | Performed by: PATHOLOGY

## 2024-01-23 PROCEDURE — 36415 COLL VENOUS BLD VENIPUNCTURE: CPT | Performed by: PATHOLOGY

## 2024-02-21 ENCOUNTER — TELEPHONE (OUTPATIENT)
Dept: GASTROENTEROLOGY | Facility: CLINIC | Age: 39
End: 2024-02-21
Payer: MEDICARE

## 2024-02-21 NOTE — TELEPHONE ENCOUNTER
Prior Authorization Approval    Medication: XIFAXAN 550 MG PO TABS  Authorization Effective Date: 2/7/2024  Authorization Expiration Date:  Further Notice  Approved Dose/Quantity: 60/30 Days  Reference #:     Insurance Company: [employer] - Phone [phone] Fax [phone]  Expected CoPay: $    CoPay Card Available:      Financial Assistance Needed: No  Which Pharmacy is filling the prescription: [facility] #[id number] [address]  Pharmacy Notified: Yes  Patient Notified: Pharmacy will notify patient.

## 2024-02-21 NOTE — TELEPHONE ENCOUNTER
PA Initiation    Medication: XIFAXAN 550 MG PO TABS  Insurance Company: [employer] - Phone [phone] Fax [phone]  [facility] the Rx[id number] [address]  Filling Pharmacy Phone: [phone]  Filling Pharmacy Fax:    Start Date: 2/21/2024  Retail Pharmacy Prior Authorization Team   Phone: [phone]

## 2024-02-21 NOTE — TELEPHONE ENCOUNTER
Prior Authorization Retail Medication Request    Medication/Dose: Xifaxan 550 mg  Diagnosis and ICD code (if different than what is on RX):  Hepatic encephalopathy  New/renewal/insurance change PA/secondary ins. PA:  Previously Tried and Failed:  Lactulose alone  Rationale:  Xifaxan helps to lower the toxin build up in the blood while lactulose works by cleansing the toxin build up in the liver. Adjunctive therapy.      Insurance   Primary:   Insurance ID:      Secondary (if applicable):  Insurance ID:      Pharmacy Information (if different than what is on RX)  Name:    Phone:    Fax:

## 2024-02-25 ENCOUNTER — HEALTH MAINTENANCE LETTER (OUTPATIENT)
Age: 39
End: 2024-02-25

## 2024-03-26 ENCOUNTER — TELEPHONE (OUTPATIENT)
Dept: GASTROENTEROLOGY | Facility: CLINIC | Age: 39
End: 2024-03-26
Payer: MEDICARE

## 2024-08-06 ENCOUNTER — TELEPHONE (OUTPATIENT)
Dept: GASTROENTEROLOGY | Facility: CLINIC | Age: 39
End: 2024-08-06
Payer: MEDICARE

## 2024-08-06 DIAGNOSIS — K76.82 HEPATIC ENCEPHALOPATHY (H): ICD-10-CM

## 2024-08-06 NOTE — TELEPHONE ENCOUNTER
Retail Pharmacy Prior Authorization Team   Phone: [phone]    PA Initiation    Medication: XIFAXAN 550 MG PO TABS  Insurance Company: [employer] - Phone [phone] Fax [phone]  Pharmacy Filling the Rx: [facility] #[id number] [address]  Filling Pharmacy Phone: [phone]  Filling Pharmacy Fax:    Start Date: 8/6/2024    Started PA on CMM and a response of Prior Authorization Not Required.  There was already a previous PA completed that states good until further notice. Called pharmacy, they did receive a paid claim and it's ready for .

## 2024-11-20 DIAGNOSIS — K70.31 ALCOHOLIC CIRRHOSIS OF LIVER WITH ASCITES (H): ICD-10-CM

## 2024-11-20 DIAGNOSIS — Z95.828 S/P TIPS (TRANSJUGULAR INTRAHEPATIC PORTOSYSTEMIC SHUNT): ICD-10-CM

## 2024-11-20 RX ORDER — LACTULOSE 10 G/15ML
SOLUTION ORAL
Qty: 946 ML | Refills: 0 | Status: SHIPPED | OUTPATIENT
Start: 2024-11-20

## 2024-12-23 DIAGNOSIS — K70.31 ALCOHOLIC CIRRHOSIS OF LIVER WITH ASCITES (H): Primary | ICD-10-CM

## 2024-12-23 DIAGNOSIS — Z95.828 S/P TIPS (TRANSJUGULAR INTRAHEPATIC PORTOSYSTEMIC SHUNT): ICD-10-CM

## 2024-12-23 DIAGNOSIS — E87.20 METABOLIC ACIDOSIS: ICD-10-CM

## 2024-12-23 DIAGNOSIS — K76.82 HEPATIC ENCEPHALOPATHY (H): ICD-10-CM

## 2024-12-26 ENCOUNTER — TELEPHONE (OUTPATIENT)
Dept: GASTROENTEROLOGY | Facility: CLINIC | Age: 39
End: 2024-12-26
Payer: MEDICARE

## 2024-12-26 NOTE — TELEPHONE ENCOUNTER
Was the patient contacted by phone and reminded of the upcoming visit? Yes    Was the patient instructed to bring a current list of all medications to the appointment or instructed to bring in all medication bottles? Yes, patient verbalized understanding    Is it anticipated the patient will need additional appointments? Yes, ultrasound    Were the additional appointments scheduled? No, imaging scheduling number given to schedule ultrasound at earliest convenience. He verbally understood and  agreed.     Was the patient instructed to arrive prior to the appointment time to have ordered labs drawn? Yes, patient verbalized understanding    Were the needed lab and imaging orders placed? Yes    Was lab appointment made 1 hour or more prior to visit?Yes, writer made lab appointment.     Anna Gifford CMA  12/26/2024 1:26 PM

## 2024-12-31 ENCOUNTER — OFFICE VISIT (OUTPATIENT)
Dept: GASTROENTEROLOGY | Facility: CLINIC | Age: 39
End: 2024-12-31
Attending: INTERNAL MEDICINE
Payer: MEDICARE

## 2024-12-31 ENCOUNTER — LAB (OUTPATIENT)
Dept: LAB | Facility: CLINIC | Age: 39
End: 2024-12-31
Payer: MEDICARE

## 2024-12-31 VITALS
OXYGEN SATURATION: 98 % | WEIGHT: 153.06 LBS | HEART RATE: 73 BPM | BODY MASS INDEX: 23.97 KG/M2 | DIASTOLIC BLOOD PRESSURE: 90 MMHG | SYSTOLIC BLOOD PRESSURE: 160 MMHG

## 2024-12-31 DIAGNOSIS — K70.10 ACUTE ALCOHOLIC LIVER DISEASE (H): Primary | ICD-10-CM

## 2024-12-31 DIAGNOSIS — Z95.828 S/P TIPS (TRANSJUGULAR INTRAHEPATIC PORTOSYSTEMIC SHUNT): ICD-10-CM

## 2024-12-31 DIAGNOSIS — K70.31 ALCOHOLIC CIRRHOSIS OF LIVER WITH ASCITES (H): ICD-10-CM

## 2024-12-31 DIAGNOSIS — K76.82 HEPATIC ENCEPHALOPATHY (H): ICD-10-CM

## 2024-12-31 DIAGNOSIS — E87.20 METABOLIC ACIDOSIS: ICD-10-CM

## 2024-12-31 LAB
AFP SERPL-MCNC: <1.8 NG/ML
ALBUMIN SERPL BCG-MCNC: 3.8 G/DL (ref 3.5–5.2)
ALP SERPL-CCNC: 174 U/L (ref 40–150)
ALT SERPL W P-5'-P-CCNC: 41 U/L (ref 0–70)
ANION GAP SERPL CALCULATED.3IONS-SCNC: 10 MMOL/L (ref 7–15)
AST SERPL W P-5'-P-CCNC: 53 U/L (ref 0–45)
BILIRUB DIRECT SERPL-MCNC: 0.43 MG/DL (ref 0–0.3)
BILIRUB SERPL-MCNC: 1.2 MG/DL
BUN SERPL-MCNC: 18.1 MG/DL (ref 6–20)
CALCIUM SERPL-MCNC: 9.4 MG/DL (ref 8.8–10.4)
CHLORIDE SERPL-SCNC: 106 MMOL/L (ref 98–107)
CREAT SERPL-MCNC: 1.02 MG/DL (ref 0.67–1.17)
EGFRCR SERPLBLD CKD-EPI 2021: >90 ML/MIN/1.73M2
GLUCOSE SERPL-MCNC: 82 MG/DL (ref 70–99)
HCO3 SERPL-SCNC: 26 MMOL/L (ref 22–29)
INR PPP: 1.1 (ref 0.85–1.15)
POTASSIUM SERPL-SCNC: 4.3 MMOL/L (ref 3.4–5.3)
PROT SERPL-MCNC: 6.3 G/DL (ref 6.4–8.3)
SODIUM SERPL-SCNC: 142 MMOL/L (ref 135–145)

## 2024-12-31 PROCEDURE — G0480 DRUG TEST DEF 1-7 CLASSES: HCPCS | Mod: 90 | Performed by: PATHOLOGY

## 2024-12-31 PROCEDURE — 85610 PROTHROMBIN TIME: CPT | Performed by: PATHOLOGY

## 2024-12-31 PROCEDURE — 99214 OFFICE O/P EST MOD 30 MIN: CPT | Performed by: INTERNAL MEDICINE

## 2024-12-31 PROCEDURE — 80053 COMPREHEN METABOLIC PANEL: CPT | Performed by: PATHOLOGY

## 2024-12-31 PROCEDURE — 82105 ALPHA-FETOPROTEIN SERUM: CPT | Performed by: INTERNAL MEDICINE

## 2024-12-31 PROCEDURE — 99000 SPECIMEN HANDLING OFFICE-LAB: CPT | Performed by: PATHOLOGY

## 2024-12-31 PROCEDURE — 36415 COLL VENOUS BLD VENIPUNCTURE: CPT | Performed by: PATHOLOGY

## 2024-12-31 PROCEDURE — G0463 HOSPITAL OUTPT CLINIC VISIT: HCPCS | Performed by: INTERNAL MEDICINE

## 2024-12-31 PROCEDURE — 82248 BILIRUBIN DIRECT: CPT | Performed by: PATHOLOGY

## 2024-12-31 ASSESSMENT — PAIN SCALES - GENERAL: PAINLEVEL_OUTOF10: NO PAIN (0)

## 2024-12-31 NOTE — NURSING NOTE
Chief Complaint   Patient presents with    RECHECK     Follow up Alcoholic cirrhosis      BP (!) 160/90 (BP Location: Right arm, Cuff Size: Adult Large)   Pulse 73   Wt 69.4 kg (153 lb 1 oz)   SpO2 98%   BMI 23.97 kg/m    Leonila Maloney, OLIVE on 12/31/2024 at 10:09 AM

## 2024-12-31 NOTE — LETTER
2024      Christo Herrera  [address] [place] [address]      Dear Colleague,    Thank you for referring your patient, Christo Herrera, to the [facility] HEPATOLOGY CLIN[facility] [place]. Please see a copy of my visit note below.    A/P  Mr. Herrera is a 39 Y M with alcohol related cirrhosis c/b refractory ascites, resolved with TIPS placed 2021. He is doing very well, with no ascites, no diuretics, low MELD. Continues on medications for HE, which is well controlled. No alcohol since .  MELD 8 ABO O    Ventral hernia discussed indication for surgery. He does not have any at this time. Continue wearing support when he exercises.  Ascites Resolved S/P TIPs. US 2023 TIPS patent. No fluid.  Off diuretics.    HE Controlled with lactulose and rifaximin. Discussed he can back off on lactulose if desired as he may not need it like he did in the past. Aim for 1-2 bowel movements per day    CKD Was on HD for periods of time. None for a couple of years. Mildly impaired kidney function intermittently. Current GFR >90    HCC Screening UTD US 2023. No mass. Overdue. Ordered  ealth Radiology Scheduling   [place], [place], [place], [place], [place]: [phone]  [place]/[facility]: [phone]  [place], [place]: [phone]    AUD Severe, in sustained remission. Last alcohol was 2019.  Bone Health No record of DEXA. Will address at next visit.    Labs and US every 6 mo. Ordered  RTC 12 mo in person or video.    hSerry Cormier MD  Hepatology/Liver Transplant  Medical Director, Liver Transplantation  [facility]  ===============================================================      Subjective: 39 year old male with alcohol related cirrhosis, previously seen by Dr. Echavarria. Cirrhosis c/b ascites, resolved with TIPS , and HE controlled on medications. Last alcohol       He was in eval for SLK and this was closed in May 2023 as he was doing well  and did not require transplant.  He continues to do really well.    S/p TIPS 8/2021 for refractory ascites. His fluid retention has resolved and he is off diuretics since at least 2023. No fluid retention.    He is going to a gym regularly.    ASCITES resolved and no longer on diuretics. Last US 6/2023 no fluid    HE Continues to take rifaximin and lactulose. Takes lactulose 1-2 times per day (15 ml). Having 3-5 stools/day. Does report difficulty with memory, though much improved from prior.   on rifaximin and lactulose.     Variceal screening Last EGD 2020  HCC screening US 6/16/23    AUD Last alcohol 11/1/19    Lab Test 05/24/23  0752   PROTTOTAL 6.4   ALBUMIN 3.8   BILITOTAL 0.9   ALKPHOS 202*   AST 52*   ALT 63*     Lab Test 05/24/23  0752   WBC 5.4   RBC 5.19   HGB 16.1   HCT 49.6   MCV 96   MCH 31.0   MCHC 32.5   RDW 13.1   *     MELD 3.0: 8 at 12/31/2024  9:11 AM  MELD-Na: 8 at 12/31/2024  9:11 AM  Calculated from:  Serum Creatinine: 1.02 mg/dL at 12/31/2024  9:11 AM  Serum Sodium: 142 mmol/L (Using max of 137 mmol/L) at 12/31/2024  9:11 AM  Total Bilirubin: 1.2 mg/dL at 12/31/2024  9:11 AM  Serum Albumin: 3.8 g/dL (Using max of 3.5 g/dL) at 12/31/2024  9:11 AM  INR(ratio): 1.1 at 12/31/2024  9:11 AM  Age at listing (hypothetical): 39 years  Sex: Male at 12/31/2024  9:11 AM        PMH  CKD was on HD in 2020  GERD  RLS  SHX  Does not work. Last worked [employer] to get [employer] license  Lives with his mother who is a significant support for him.  Also has brother and father close by     Current Outpatient Medications   Medication Sig Dispense Refill     acetaminophen (TYLENOL) 325 MG tablet Take 325-650 mg by mouth every 6 hours as needed for mild pain       lactulose (CHRONULAC) 10 GM/15ML solution TAKE 10 TO 15 ML BY MOUTH TWICE DAILY 946 mL 0     Menthol-Methyl Salicylate (ICY HOT EXTRA STRENGTH) 10-30 % CREA Apply topically daily as needed        Multiple Vitamins-Minerals (SUPER THERA PRERNA M) TABS  Take 1 tablet by mouth every morning        Psyllium 58.12 % PACK Take by mouth daily as needed        rifaximin (XIFAXAN) 550 MG TABS tablet TAKE 1 TABLET(550 MG) BY MOUTH TWICE DAILY 60 tablet 11     gabapentin (NEURONTIN) 100 MG capsule Take 100 mg by mouth nightly as needed        No current facility-administered medications for this visit.   Exam  Gen Alert pleasant NAD  Resp No difficulty breathing. No cough  Skin No Jaundice  Eyes No icterus  Neuro HERNÁNDEZ  MSK no muscle wasting  Psyche Pleasant, appropriate. Well groomed.          Again, thank you for allowing me to participate in the care of your patient.        Sincerely,        Sherry Cormier MD    Electronically signed

## 2024-12-31 NOTE — PROGRESS NOTES
A/P  Mr. Herrera is a 39 Y M with alcohol related cirrhosis c/b refractory ascites, resolved with TIPS placed 2021. He is doing very well, with no ascites, no diuretics, low MELD. Continues on medications for HE, which is well controlled. No alcohol since .  MELD 8 ABO O    Ventral hernia discussed indication for surgery. He does not have any at this time. Continue wearing support when he exercises.  Ascites Resolved S/P TIPs. US 2023 TIPS patent. No fluid.  Off diuretics.    HE Controlled with lactulose and rifaximin. Discussed he can back off on lactulose if desired as he may not need it like he did in the past. Aim for 1-2 bowel movements per day    CKD Was on HD for periods of time. None for a couple of years. Mildly impaired kidney function intermittently. Current GFR >90    HCC Screening UTD US 2023. No mass. Overdue. Or[facility]   [place], [place], [place], [place], [place]: [phone]  [place]: [phone]  [place], [place]: [phone]    AUD Severe, in sustained remission. Last alcohol was 2019.  Bone Health No record of DEXA. Will address at next visit.    Labs and US every 6 mo. Ordered  RTC 12 mo in person or video.    Sherry Cormier MD  Hepatology/Liver Transplant  Medical Director, Liver Transplantation  [facility]  ===============================================================      Subjective: 39 year old male with alcohol related cirrhosis, previously seen by Dr. Echavarria. Cirrhosis c/b ascites, resolved with TIPS , and HE controlled on medications. Last alcohol       He was in eval for SLK and this was closed in May 2023 as he was doing well and did not require transplant.  He continues to do really well.    S/p TIPS 2021 for refractory ascites. His fluid retention has resolved and he is off diuretics since at least . No fluid retention.    He is going to a gym regularly.    ASCITES  resolved and no longer on diuretics. Last US 6/2023 no fluid    HE Continues to take rifaximin and lactulose. Takes lactulose 1-2 times per day (15 ml). Having 3-5 stools/day. Does report difficulty with memory, though much improved from prior.   on rifaximin and lactulose.     Variceal screening Last EGD 2020  HCC screening US 6/16/23    AUD Last alcohol 11/1/19    Lab Test 05/24/23  0752   PROTTOTAL 6.4   ALBUMIN 3.8   BILITOTAL 0.9   ALKPHOS 202*   AST 52*   ALT 63*     Lab Test 05/24/23  0752   WBC 5.4   RBC 5.19   HGB 16.1   HCT 49.6   MCV 96   MCH 31.0   MCHC 32.5   RDW 13.1   *     MELD 3.0: 8 at 12/31/2024  9:11 AM  MELD-Na: 8 at 12/31/2024  9:11 AM  Calculated from:  Serum Creatinine: 1.02 mg/dL at 12/31/2024  9:11 AM  Serum Sodium: 142 mmol/L (Using max of 137 mmol/L) at 12/31/2024  9:11 AM  Total Bilirubin: 1.2 mg/dL at 12/31/2024  9:11 AM  Serum Albumin: 3.8 g/dL (Using max of 3.5 g/dL) at 12/31/2024  9:11 AM  INR(ratio): 1.1 at 12/31/2024  9:11 AM  Age at listing (hypothetical): 39 years  Sex: Male at 12/31/2024  9:11 AM        PMH  CKD was on HD in 2020  GERD  RLS  SHX  Does not work. Last worked [employer] to get [employer]'s license  Lives with his mother who is a significant support for him.  Also has brother and father close by     Current Outpatient Medications   Medication Sig Dispense Refill    acetaminophen (TYLENOL) 325 MG tablet Take 325-650 mg by mouth every 6 hours as needed for mild pain      lactulose (CHRONULAC) 10 GM/15ML solution TAKE 10 TO 15 ML BY MOUTH TWICE DAILY 946 mL 0    Menthol-Methyl Salicylate (ICY HOT EXTRA STRENGTH) 10-30 % CREA Apply topically daily as needed       Multiple Vitamins-Minerals (SUPER THERA PRERNA M) TABS Take 1 tablet by mouth every morning       Psyllium 58.12 % PACK Take by mouth daily as needed       rifaximin (XIFAXAN) 550 MG TABS tablet TAKE 1 TABLET(550 MG) BY MOUTH TWICE DAILY 60 tablet 11    gabapentin (NEURONTIN) 100 MG capsule Take 100 mg by  mouth nightly as needed        No current facility-administered medications for this visit.   Exam  Gen Alert pleasant NAD  Resp No difficulty breathing. No cough  Skin No Jaundice  Eyes No icterus  Neuro HERNÁNDEZ  MSK no muscle wasting  Psyche Pleasant, appropriate. Well groomed.

## 2025-01-02 LAB
LABORATORY REPORT: NORMAL
PETH INTERPRETATION: NORMAL
PLPETH BLD-MCNC: <10 NG/ML
POPETH BLD-MCNC: <10 NG/ML

## 2025-01-06 ENCOUNTER — HOSPITAL ENCOUNTER (OUTPATIENT)
Dept: ULTRASOUND IMAGING | Facility: CLINIC | Age: 40
Discharge: HOME | End: 2025-01-06
Attending: INTERNAL MEDICINE | Admitting: INTERNAL MEDICINE
Payer: MEDICARE

## 2025-01-06 DIAGNOSIS — K70.31 ALCOHOLIC CIRRHOSIS OF LIVER WITH ASCITES (H): ICD-10-CM

## 2025-01-06 PROCEDURE — 93975 VASCULAR STUDY: CPT

## 2025-03-15 ENCOUNTER — HEALTH MAINTENANCE LETTER (OUTPATIENT)
Age: 40
End: 2025-03-15

## 2025-06-09 ENCOUNTER — RESULTS FOLLOW-UP (OUTPATIENT)
Dept: GASTROENTEROLOGY | Facility: CLINIC | Age: 40
End: 2025-06-09

## 2025-06-09 ENCOUNTER — HOSPITAL ENCOUNTER (OUTPATIENT)
Dept: ULTRASOUND IMAGING | Facility: CLINIC | Age: 40
Discharge: HOME | End: 2025-06-09
Attending: INTERNAL MEDICINE | Admitting: INTERNAL MEDICINE
Payer: MEDICARE

## 2025-06-09 DIAGNOSIS — K70.31 ALCOHOLIC CIRRHOSIS OF LIVER WITH ASCITES (H): ICD-10-CM

## 2025-06-09 PROCEDURE — 76705 ECHO EXAM OF ABDOMEN: CPT

## 2025-07-09 ENCOUNTER — TELEPHONE (OUTPATIENT)
Dept: GASTROENTEROLOGY | Facility: CLINIC | Age: 40
End: 2025-07-09
Payer: MEDICARE

## 2025-07-09 NOTE — TELEPHONE ENCOUNTER
Patient confirmed scheduled appointment:     Date: 9/9/25  Time: 8:00 am  Appointment Type: Return Liver  Provider: Dr. Sherry Cormier  Location: [place]  Testing/imaging: Lab  Additional Notes:

## (undated) RX ORDER — ONDANSETRON 2 MG/ML
INJECTION INTRAMUSCULAR; INTRAVENOUS
Status: DISPENSED
Start: 2021-08-27

## (undated) RX ORDER — HYDROMORPHONE HCL IN WATER/PF 6 MG/30 ML
PATIENT CONTROLLED ANALGESIA SYRINGE INTRAVENOUS
Status: DISPENSED
Start: 2021-08-27

## (undated) RX ORDER — DOBUTAMINE HYDROCHLORIDE 200 MG/100ML
INJECTION INTRAVENOUS
Status: DISPENSED
Start: 2020-07-15

## (undated) RX ORDER — FENTANYL CITRATE 50 UG/ML
INJECTION, SOLUTION INTRAMUSCULAR; INTRAVENOUS
Status: DISPENSED
Start: 2021-11-24

## (undated) RX ORDER — METOPROLOL TARTRATE 1 MG/ML
INJECTION, SOLUTION INTRAVENOUS
Status: DISPENSED
Start: 2020-07-15

## (undated) RX ORDER — DOBUTAMINE HYDROCHLORIDE 200 MG/100ML
INJECTION INTRAVENOUS
Status: DISPENSED
Start: 2021-10-05

## (undated) RX ORDER — ALBUMIN (HUMAN) 12.5 G/50ML
SOLUTION INTRAVENOUS
Status: DISPENSED
Start: 2021-08-16

## (undated) RX ORDER — LIDOCAINE HYDROCHLORIDE 10 MG/ML
INJECTION, SOLUTION EPIDURAL; INFILTRATION; INTRACAUDAL; PERINEURAL
Status: DISPENSED
Start: 2020-03-19

## (undated) RX ORDER — ALBUMIN (HUMAN) 12.5 G/50ML
SOLUTION INTRAVENOUS
Status: DISPENSED
Start: 2021-09-27

## (undated) RX ORDER — ALBUMIN (HUMAN) 12.5 G/50ML
SOLUTION INTRAVENOUS
Status: DISPENSED
Start: 2021-11-03

## (undated) RX ORDER — LIDOCAINE HYDROCHLORIDE 10 MG/ML
INJECTION, SOLUTION EPIDURAL; INFILTRATION; INTRACAUDAL; PERINEURAL
Status: DISPENSED
Start: 2020-03-12

## (undated) RX ORDER — METOPROLOL TARTRATE 1 MG/ML
INJECTION, SOLUTION INTRAVENOUS
Status: DISPENSED
Start: 2021-10-05

## (undated) RX ORDER — ALBUMIN (HUMAN) 12.5 G/50ML
SOLUTION INTRAVENOUS
Status: DISPENSED
Start: 2021-09-07

## (undated) RX ORDER — SODIUM CHLORIDE 9 MG/ML
INJECTION, SOLUTION INTRAVENOUS
Status: DISPENSED
Start: 2021-11-24

## (undated) RX ORDER — ALBUMIN (HUMAN) 12.5 G/50ML
SOLUTION INTRAVENOUS
Status: DISPENSED
Start: 2021-12-10

## (undated) RX ORDER — ALBUMIN (HUMAN) 12.5 G/50ML
SOLUTION INTRAVENOUS
Status: DISPENSED
Start: 2021-09-17

## (undated) RX ORDER — HYDROMORPHONE HYDROCHLORIDE 1 MG/ML
INJECTION, SOLUTION INTRAMUSCULAR; INTRAVENOUS; SUBCUTANEOUS
Status: DISPENSED
Start: 2021-08-27

## (undated) RX ORDER — ALBUMIN (HUMAN) 12.5 G/50ML
SOLUTION INTRAVENOUS
Status: DISPENSED
Start: 2021-11-12

## (undated) RX ORDER — FENTANYL CITRATE 50 UG/ML
INJECTION, SOLUTION INTRAMUSCULAR; INTRAVENOUS
Status: DISPENSED
Start: 2021-08-27

## (undated) RX ORDER — ALBUMIN (HUMAN) 12.5 G/50ML
SOLUTION INTRAVENOUS
Status: DISPENSED
Start: 2021-11-22

## (undated) RX ORDER — LIDOCAINE HYDROCHLORIDE 10 MG/ML
INJECTION, SOLUTION EPIDURAL; INFILTRATION; INTRACAUDAL; PERINEURAL
Status: DISPENSED
Start: 2021-11-24

## (undated) RX ORDER — ALBUMIN (HUMAN) 12.5 G/50ML
SOLUTION INTRAVENOUS
Status: DISPENSED
Start: 2021-08-05

## (undated) RX ORDER — ALBUMIN (HUMAN) 12.5 G/50ML
SOLUTION INTRAVENOUS
Status: DISPENSED
Start: 2020-03-12

## (undated) RX ORDER — ALBUMIN (HUMAN) 12.5 G/50ML
SOLUTION INTRAVENOUS
Status: DISPENSED
Start: 2021-07-16

## (undated) RX ORDER — LIDOCAINE HYDROCHLORIDE 10 MG/ML
INJECTION, SOLUTION EPIDURAL; INFILTRATION; INTRACAUDAL; PERINEURAL
Status: DISPENSED
Start: 2021-08-27

## (undated) RX ORDER — ATROPINE SULFATE/0.9 %SOD CHLR 1.2 MG/3ML
SYRINGE (ML) INTRAVENOUS
Status: DISPENSED
Start: 2020-07-15

## (undated) RX ORDER — ALBUMIN (HUMAN) 12.5 G/50ML
SOLUTION INTRAVENOUS
Status: DISPENSED
Start: 2021-12-01

## (undated) RX ORDER — AMPICILLIN AND SULBACTAM 2; 1 G/1; G/1
INJECTION, POWDER, FOR SOLUTION INTRAMUSCULAR; INTRAVENOUS
Status: DISPENSED
Start: 2021-08-27

## (undated) RX ORDER — ALBUMIN (HUMAN) 12.5 G/50ML
SOLUTION INTRAVENOUS
Status: DISPENSED
Start: 2020-03-19

## (undated) RX ORDER — ALBUMIN (HUMAN) 12.5 G/50ML
SOLUTION INTRAVENOUS
Status: DISPENSED
Start: 2021-10-25

## (undated) RX ORDER — ATROPINE SULFATE 0.4 MG/ML
AMPUL (ML) INJECTION
Status: DISPENSED
Start: 2021-10-05

## (undated) RX ORDER — ALBUMIN (HUMAN) 12.5 G/50ML
SOLUTION INTRAVENOUS
Status: DISPENSED
Start: 2021-08-24

## (undated) RX ORDER — ALBUMIN (HUMAN) 12.5 G/50ML
SOLUTION INTRAVENOUS
Status: DISPENSED
Start: 2021-10-07

## (undated) RX ORDER — ALBUMIN (HUMAN) 12.5 G/50ML
SOLUTION INTRAVENOUS
Status: DISPENSED
Start: 2021-07-26

## (undated) RX ORDER — ALBUMIN (HUMAN) 12.5 G/50ML
SOLUTION INTRAVENOUS
Status: DISPENSED
Start: 2021-08-20